# Patient Record
Sex: FEMALE | Race: BLACK OR AFRICAN AMERICAN | NOT HISPANIC OR LATINO | Employment: OTHER | URBAN - METROPOLITAN AREA
[De-identification: names, ages, dates, MRNs, and addresses within clinical notes are randomized per-mention and may not be internally consistent; named-entity substitution may affect disease eponyms.]

---

## 2017-03-27 ENCOUNTER — HISTORICAL (OUTPATIENT)
Dept: FAMILY MEDICINE | Facility: CLINIC | Age: 72
End: 2017-03-27

## 2018-05-02 ENCOUNTER — HISTORICAL (OUTPATIENT)
Dept: RADIOLOGY | Facility: HOSPITAL | Age: 73
End: 2018-05-02

## 2018-06-20 ENCOUNTER — HOSPITAL ENCOUNTER (OUTPATIENT)
Dept: MEDSURG UNIT | Facility: HOSPITAL | Age: 73
End: 2018-06-22
Attending: INTERNAL MEDICINE | Admitting: INTERNAL MEDICINE

## 2018-06-20 LAB
BNP BLD-MCNC: 32 PG/ML (ref 0–150)
TROPONIN I SERPL-MCNC: <0.015 NG/ML (ref 0–0.04)

## 2018-06-22 LAB
ABS NEUT (OLG): 6.77 X10(3)/MCL (ref 2.1–9.2)
BASOPHILS # BLD AUTO: 0 X10(3)/MCL (ref 0–0.2)
BASOPHILS NFR BLD AUTO: 0 %
BUN SERPL-MCNC: 18 MG/DL (ref 7–18)
CALCIUM SERPL-MCNC: 9 MG/DL (ref 8.5–10.1)
CHLORIDE SERPL-SCNC: 97 MMOL/L (ref 98–107)
CO2 SERPL-SCNC: 32 MMOL/L (ref 21–32)
CREAT SERPL-MCNC: 1.16 MG/DL (ref 0.55–1.02)
CREAT/UREA NIT SERPL: 15.5
EOSINOPHIL # BLD AUTO: 0 X10(3)/MCL (ref 0–0.9)
EOSINOPHIL NFR BLD AUTO: 0 %
ERYTHROCYTE [DISTWIDTH] IN BLOOD BY AUTOMATED COUNT: 18.1 % (ref 11.5–17)
GLUCOSE SERPL-MCNC: 94 MG/DL (ref 74–106)
HCT VFR BLD AUTO: 40.5 % (ref 37–47)
HGB BLD-MCNC: 12.3 GM/DL (ref 12–16)
LYMPHOCYTES # BLD AUTO: 1.7 X10(3)/MCL (ref 0.6–4.6)
LYMPHOCYTES NFR BLD AUTO: 18 %
MAGNESIUM SERPL-MCNC: 2.1 MG/DL (ref 1.8–2.4)
MCH RBC QN AUTO: 24.9 PG (ref 27–31)
MCHC RBC AUTO-ENTMCNC: 30.4 GM/DL (ref 33–36)
MCV RBC AUTO: 82.2 FL (ref 80–94)
MONOCYTES # BLD AUTO: 1 X10(3)/MCL (ref 0.1–1.3)
MONOCYTES NFR BLD AUTO: 10 %
NEUTROPHILS # BLD AUTO: 6.77 X10(3)/MCL (ref 1.4–7.9)
NEUTROPHILS NFR BLD AUTO: 71 %
PLATELET # BLD AUTO: 209 X10(3)/MCL (ref 130–400)
PMV BLD AUTO: 10.2 FL (ref 9.4–12.4)
POTASSIUM SERPL-SCNC: 4.1 MMOL/L (ref 3.5–5.1)
RBC # BLD AUTO: 4.93 X10(6)/MCL (ref 4.2–5.4)
SODIUM SERPL-SCNC: 137 MMOL/L (ref 136–145)
WBC # SPEC AUTO: 9.6 X10(3)/MCL (ref 4.5–11.5)

## 2018-08-27 ENCOUNTER — HISTORICAL (OUTPATIENT)
Dept: FAMILY MEDICINE | Facility: CLINIC | Age: 73
End: 2018-08-27

## 2018-10-19 ENCOUNTER — HISTORICAL (OUTPATIENT)
Dept: ADMINISTRATIVE | Facility: HOSPITAL | Age: 73
End: 2018-10-19

## 2018-10-19 LAB
CHOLEST SERPL-MCNC: 180 MG/DL
CHOLEST/HDLC SERPL: 3.8 {RATIO} (ref 0–4.4)
DEPRECATED CALCIDIOL+CALCIFEROL SERPL-MC: 11.71 NG/ML (ref 30–80)
EST. AVERAGE GLUCOSE BLD GHB EST-MCNC: 137 MG/DL
HAV IGM SERPL QL IA: NONREACTIVE
HBA1C MFR BLD: 6.4 % (ref 4.2–6.3)
HBV CORE IGM SERPL QL IA: NONREACTIVE
HBV SURFACE AG SERPL QL IA: NEGATIVE
HCV AB SERPL QL IA: NONREACTIVE
HDLC SERPL-MCNC: 48 MG/DL
HIV 1+2 AB+HIV1 P24 AG SERPL QL IA: NONREACTIVE
LDLC SERPL CALC-MCNC: 105 MG/DL (ref 0–130)
T PALLIDUM AB SER QL: NONREACTIVE
TRIGL SERPL-MCNC: 137 MG/DL
TSH SERPL-ACNC: 3.4 MIU/L (ref 0.36–3.74)
VLDLC SERPL CALC-MCNC: 27 MG/DL

## 2019-08-02 ENCOUNTER — HISTORICAL (OUTPATIENT)
Dept: ADMINISTRATIVE | Facility: HOSPITAL | Age: 74
End: 2019-08-02

## 2019-08-02 LAB
BUN SERPL-MCNC: 14 MG/DL (ref 7–18)
CALCIUM SERPL-MCNC: 8.8 MG/DL (ref 8.5–10.1)
CHLORIDE SERPL-SCNC: 107 MMOL/L (ref 98–107)
CO2 SERPL-SCNC: 33 MMOL/L (ref 21–32)
CREAT SERPL-MCNC: 1.1 MG/DL (ref 0.6–1.3)
CREAT/UREA NIT SERPL: 13
DEPRECATED CALCIDIOL+CALCIFEROL SERPL-MC: 11.96 NG/ML (ref 30–80)
EST. AVERAGE GLUCOSE BLD GHB EST-MCNC: 126 MG/DL
GLUCOSE SERPL-MCNC: 85 MG/DL (ref 74–106)
HBA1C MFR BLD: 6 % (ref 4.2–6.3)
POTASSIUM SERPL-SCNC: 4.9 MMOL/L (ref 3.5–5.1)
SODIUM SERPL-SCNC: 142 MMOL/L (ref 136–145)

## 2019-11-21 ENCOUNTER — HISTORICAL (OUTPATIENT)
Dept: ADMINISTRATIVE | Facility: HOSPITAL | Age: 74
End: 2019-11-21

## 2019-11-21 LAB — DEPRECATED CALCIDIOL+CALCIFEROL SERPL-MC: 10.71 NG/ML (ref 30–80)

## 2020-09-11 ENCOUNTER — HISTORICAL (OUTPATIENT)
Dept: ADMINISTRATIVE | Facility: HOSPITAL | Age: 75
End: 2020-09-11

## 2020-09-11 LAB
ALBUMIN SERPL-MCNC: 3.7 GM/DL (ref 3.4–5)
ALBUMIN/GLOB SERPL: 0.9 RATIO (ref 1.1–2)
ALP SERPL-CCNC: 104 UNIT/L (ref 45–117)
ALT SERPL-CCNC: 13 UNIT/L (ref 12–78)
AMPHET UR QL SCN: NEGATIVE
APPEARANCE, UA: CLEAR
AST SERPL-CCNC: 11 UNIT/L (ref 15–37)
BACTERIA #/AREA URNS AUTO: ABNORMAL /HPF
BARBITURATE SCN PRESENT UR: NEGATIVE
BENZODIAZ UR QL SCN: NEGATIVE
BILIRUB SERPL-MCNC: 0.1 MG/DL (ref 0.2–1)
BILIRUB UR QL STRIP: NEGATIVE
BILIRUBIN DIRECT+TOT PNL SERPL-MCNC: <0.1 MG/DL (ref 0–0.2)
BILIRUBIN DIRECT+TOT PNL SERPL-MCNC: ABNORMAL MG/DL
BUN SERPL-MCNC: 11 MG/DL (ref 7–18)
CALCIUM SERPL-MCNC: 8.8 MG/DL (ref 8.5–10.1)
CANNABINOIDS UR QL SCN: NEGATIVE
CHLORIDE SERPL-SCNC: 106 MMOL/L (ref 98–107)
CO2 SERPL-SCNC: 29 MMOL/L (ref 21–32)
COCAINE UR QL SCN: NEGATIVE
COLOR UR: YELLOW
CREAT SERPL-MCNC: 0.8 MG/DL (ref 0.6–1.3)
DEPRECATED CALCIDIOL+CALCIFEROL SERPL-MC: 7.1 NG/ML (ref 30–80)
EST. AVERAGE GLUCOSE BLD GHB EST-MCNC: 137 MG/DL
GLOBULIN SER-MCNC: 4 GM/ML (ref 2.3–3.5)
GLUCOSE (UA): NEGATIVE
GLUCOSE SERPL-MCNC: 95 MG/DL (ref 74–106)
HBA1C MFR BLD: 6.4 % (ref 4.2–6.3)
HGB UR QL STRIP: NEGATIVE
HYALINE CASTS #/AREA URNS LPF: ABNORMAL /LPF
KETONES UR QL STRIP: NEGATIVE
LEUKOCYTE ESTERASE UR QL STRIP: 500 LEU/UL
NITRITE UR QL STRIP: NEGATIVE
OPIATES UR QL SCN: NEGATIVE
PCP UR QL: NEGATIVE
PH UR STRIP.AUTO: 6 [PH] (ref 5–8)
PH UR STRIP: 6 [PH] (ref 4.5–8)
POTASSIUM SERPL-SCNC: 3.8 MMOL/L (ref 3.5–5.1)
PROT SERPL-MCNC: 7.7 GM/DL (ref 6.4–8.2)
PROT UR QL STRIP: 10 MG/DL
RBC #/AREA URNS AUTO: ABNORMAL /HPF
SODIUM SERPL-SCNC: 142 MMOL/L (ref 136–145)
SP GR UR STRIP: 1.02 (ref 1–1.03)
SQUAMOUS #/AREA URNS LPF: ABNORMAL /LPF
TEMPERATURE, URINE (OHS): 20.2 DEGC (ref 20–25)
UROBILINOGEN UR STRIP-ACNC: NORMAL
WBC #/AREA URNS AUTO: ABNORMAL /HPF

## 2021-02-24 ENCOUNTER — HISTORICAL (OUTPATIENT)
Dept: ADMINISTRATIVE | Facility: HOSPITAL | Age: 76
End: 2021-02-24

## 2021-02-24 LAB
ALBUMIN SERPL-MCNC: 3.5 GM/DL (ref 3.4–4.8)
ALBUMIN/GLOB SERPL: 1 RATIO (ref 1.1–2)
ALP SERPL-CCNC: 96 UNIT/L (ref 40–150)
ALT SERPL-CCNC: 8 UNIT/L (ref 0–55)
AST SERPL-CCNC: 10 UNIT/L (ref 5–34)
BILIRUB SERPL-MCNC: 0.1 MG/DL
BILIRUBIN DIRECT+TOT PNL SERPL-MCNC: 0 MG/DL (ref 0–0.8)
BILIRUBIN DIRECT+TOT PNL SERPL-MCNC: 0.1 MG/DL (ref 0–0.5)
BUN SERPL-MCNC: 20.2 MG/DL (ref 9.8–20.1)
CALCIUM SERPL-MCNC: 8.9 MG/DL (ref 8.4–10.2)
CHLORIDE SERPL-SCNC: 103 MMOL/L (ref 98–107)
CO2 SERPL-SCNC: 30 MMOL/L (ref 23–31)
CREAT SERPL-MCNC: 0.98 MG/DL (ref 0.55–1.02)
DEPRECATED CALCIDIOL+CALCIFEROL SERPL-MC: 29.4 NG/ML (ref 30–80)
GLOBULIN SER-MCNC: 3.6 GM/DL (ref 2.4–3.5)
GLUCOSE SERPL-MCNC: 92 MG/DL (ref 82–115)
POTASSIUM SERPL-SCNC: 4.3 MMOL/L (ref 3.5–5.1)
PROT SERPL-MCNC: 7.1 GM/DL (ref 5.8–7.6)
SODIUM SERPL-SCNC: 140 MMOL/L (ref 136–145)

## 2021-08-19 ENCOUNTER — HISTORICAL (OUTPATIENT)
Dept: RADIOLOGY | Facility: HOSPITAL | Age: 76
End: 2021-08-19

## 2021-09-04 ENCOUNTER — HOSPITAL ENCOUNTER (OUTPATIENT)
Dept: EMERGENCY MEDICINE | Facility: HOSPITAL | Age: 76
End: 2021-09-05
Attending: INTERNAL MEDICINE | Admitting: INTERNAL MEDICINE

## 2021-09-04 LAB
ABS NEUT (OLG): 8.07 X10(3)/MCL (ref 2.1–9.2)
ALBUMIN SERPL-MCNC: 4 GM/DL (ref 3.4–4.8)
ALBUMIN/GLOB SERPL: 1 RATIO (ref 1.1–2)
ALP SERPL-CCNC: 87 UNIT/L (ref 40–150)
ALT SERPL-CCNC: 11 UNIT/L (ref 0–55)
ANISOCYTOSIS BLD QL SMEAR: 1
APPEARANCE, UA: CLEAR
AST SERPL-CCNC: 12 UNIT/L (ref 5–34)
BACTERIA SPEC CULT: ABNORMAL /HPF
BASOPHILS # BLD AUTO: 0 X10(3)/MCL (ref 0–0.2)
BASOPHILS NFR BLD AUTO: 0 %
BILIRUB SERPL-MCNC: 0.2 MG/DL
BILIRUB UR QL STRIP: NEGATIVE
BILIRUBIN DIRECT+TOT PNL SERPL-MCNC: 0 MG/DL (ref 0–0.8)
BILIRUBIN DIRECT+TOT PNL SERPL-MCNC: 0.2 MG/DL (ref 0–0.5)
BUN SERPL-MCNC: 13 MG/DL (ref 9.8–20.1)
CALCIUM SERPL-MCNC: 9.7 MG/DL (ref 8.4–10.2)
CHLORIDE SERPL-SCNC: 98 MMOL/L (ref 98–107)
CO2 SERPL-SCNC: 28 MMOL/L (ref 23–31)
COLOR UR: YELLOW
CREAT SERPL-MCNC: 0.84 MG/DL (ref 0.55–1.02)
EOSINOPHIL # BLD AUTO: 0 X10(3)/MCL (ref 0–0.9)
EOSINOPHIL NFR BLD AUTO: 0 %
ERYTHROCYTE [DISTWIDTH] IN BLOOD BY AUTOMATED COUNT: 21.2 % (ref 11.5–17)
GLOBULIN SER-MCNC: 3.9 GM/DL (ref 2.4–3.5)
GLUCOSE (UA): NEGATIVE
GLUCOSE SERPL-MCNC: 106 MG/DL (ref 82–115)
HCT VFR BLD AUTO: 40.5 % (ref 37–47)
HGB BLD-MCNC: 12.2 GM/DL (ref 12–16)
HGB UR QL STRIP: NEGATIVE
HYPOCHROMIA BLD QL SMEAR: 1
IMM GRANULOCYTES # BLD AUTO: 0.03 10*3/UL
IMM GRANULOCYTES NFR BLD AUTO: 0 %
KETONES UR QL STRIP: ABNORMAL
LEUKOCYTE ESTERASE UR QL STRIP: NEGATIVE
LIPASE SERPL-CCNC: 6 U/L
LYMPHOCYTES # BLD AUTO: 1.1 X10(3)/MCL (ref 0.6–4.6)
LYMPHOCYTES NFR BLD AUTO: 11 %
MCH RBC QN AUTO: 22.3 PG (ref 27–31)
MCHC RBC AUTO-ENTMCNC: 30.1 GM/DL (ref 33–36)
MCV RBC AUTO: 73.9 FL (ref 80–94)
MONOCYTES # BLD AUTO: 0.5 X10(3)/MCL (ref 0.1–1.3)
MONOCYTES NFR BLD AUTO: 5 %
NEUTROPHILS # BLD AUTO: 8.07 X10(3)/MCL (ref 2.1–9.2)
NEUTROPHILS NFR BLD AUTO: 83 %
NITRITE UR QL STRIP: NEGATIVE
PH UR STRIP: 7 [PH] (ref 5–9)
PLATELET # BLD AUTO: 270 X10(3)/MCL (ref 130–400)
PLATELET # BLD EST: NORMAL 10*3/UL
PMV BLD AUTO: 9.8 FL (ref 7.4–10.4)
POTASSIUM SERPL-SCNC: 3.7 MMOL/L (ref 3.5–5.1)
PROT SERPL-MCNC: 7.9 GM/DL (ref 5.8–7.6)
PROT UR QL STRIP: ABNORMAL
RBC # BLD AUTO: 5.48 X10(6)/MCL (ref 4.2–5.4)
RBC #/AREA URNS HPF: ABNORMAL /HPF
SARS-COV-2 AG RESP QL IA.RAPID: NEGATIVE
SODIUM SERPL-SCNC: 140 MMOL/L (ref 136–145)
SP GR UR STRIP: >=1.04 (ref 1–1.03)
SQUAMOUS EPITHELIAL, UA: 9 /HPF (ref 0–4)
TARGETS BLD QL SMEAR: 1
UROBILINOGEN UR STRIP-ACNC: 1
WBC # SPEC AUTO: 9.7 X10(3)/MCL (ref 4.5–11.5)
WBC #/AREA URNS HPF: 15 /HPF (ref 0–3)

## 2021-09-05 LAB
BUN SERPL-MCNC: 11.8 MG/DL (ref 9.8–20.1)
CALCIUM SERPL-MCNC: 8.6 MG/DL (ref 8.4–10.2)
CHLORIDE SERPL-SCNC: 96 MMOL/L (ref 98–107)
CO2 SERPL-SCNC: 28 MMOL/L (ref 23–31)
CREAT SERPL-MCNC: 1.02 MG/DL (ref 0.55–1.02)
CREAT/UREA NIT SERPL: 12
GLUCOSE SERPL-MCNC: 124 MG/DL (ref 82–115)
MAGNESIUM SERPL-MCNC: 1.9 MG/DL (ref 1.6–2.6)
POTASSIUM SERPL-SCNC: 3.7 MMOL/L (ref 3.5–5.1)
SODIUM SERPL-SCNC: 138 MMOL/L (ref 136–145)

## 2022-04-11 ENCOUNTER — HISTORICAL (OUTPATIENT)
Dept: ADMINISTRATIVE | Facility: HOSPITAL | Age: 77
End: 2022-04-11
Payer: COMMERCIAL

## 2022-04-27 VITALS
OXYGEN SATURATION: 98 % | HEIGHT: 64 IN | BODY MASS INDEX: 38.73 KG/M2 | WEIGHT: 226.88 LBS | SYSTOLIC BLOOD PRESSURE: 147 MMHG | DIASTOLIC BLOOD PRESSURE: 72 MMHG

## 2022-04-30 NOTE — ED PROVIDER NOTES
"   Patient:   Dayami Aleman            MRN: [id number]            FIN: [id number]               Age:   73 years     Sex:  Female     :  1945   Associated Diagnoses:   Uncontrolled hypertension; Dyspnea; Facial twitching   Author:   Jack Moya MD      Basic Information   Time seen: Date & time 2018 17:01:00.   History source: Patient.   Arrival mode: Private vehicle.   History limitation: None.   Additional information: Chief Complaint from Nursing Triage Note : Chief Complaint   2018 16:32 CDT      Chief Complaint           pt to er c/o headache and right side of face "jerking" pt seen earlier for dizziness, headache and weakness.    2018 8:55 CDT       Chief Complaint           pt to er c/o headache, dizziness and weakness onset this morning. states had blood pressure change yesterday.  .      History of Present Illness   The patient presents with headache and high bp.  The onset was 1  hours ago.  The course/duration of symptoms is constant.  Location: generalized. Radiating pain: none. The character of symptoms is dull.  The degree at onset was moderate.  The degree at maximum was moderate.  The degree at present is moderate.  The exacerbating factor is none.  The relieving factor is none.  Risk factors consist of hypertension.  Prior episodes: occasional.  Therapy today: seen here earlier wiyh labs and ct..  Preceding symptoms: none.  Associated symptoms: dizziness.        Review of Systems             Additional review of systems information: All other systems reviewed and otherwise negative.      Health Status   Allergies:    Allergic Reactions (Selected)  Severity Not Documented  Sulfa drugs- No reactions were documented.,    Allergies (1) Active Reaction  sulfa drugs None Documented  .   Medications:  (Selected)   Prescriptions  Prescribed  albuterol CFC free 90 mcg/inh inhalation aerosol with adapter: 2 puff(s), INH, q6hr, PRN PRN as needed for wheezing, # 1 EA, 2 " Refill(s), Pharmacy: [facility]/pharmacy #[id number]  candesartan 16 mg oral tablet: See Instructions, TAKE 1 TABLET BY MOUTH TWICE A DAY, # 180 tab(s), 2 Refill(s), eRx: [facility]/pharmacy #[id number]  spironolactone 25 mg oral tablet: See Instructions, TAKE 1 TABLET BY MOUTH AFTER BREAKFAST, # 90 tab(s), 1 Refill(s), eRx: [facility]/pharmacy #[id number]  traMADol 50 mg oral tablet: 50 mg = 1 tab(s), Oral, Daily, PRN PRN as needed for pain, # 30 tab(s), 0 Refill(s)  verapamil 120 mg oral tablet, extended release: 120 mg = 1 tab(s), Oral, Daily, # 90 tab(s), 2 Refill(s), Pharmacy: [facility]/pharmacy #[id number]  Documented Medications  Documented  Qvar 40 mcg/inh inhalation aerosol with adapter: 1 inh, INH, 2 puff(s), INH, bid, # 1 EA, 4 Refill(s), Type: Maintenance, Pharmacy: [facility]/pharmacy #[id number], 2 puff(s) inh bid  aspirin 325 mg oral tablet: 325 mg = 1 tab(s), Oral, Daily, 0 Refill(s).      Past Medical/ Family/ Social History   Medical history:    No active or resolved past medical history items have been selected or recorded., Reviewed as documented in chart, htn.   Surgical history:    Hernia repair ([id number]).  Gallbladder operation ([id number])., Reviewed as documented in chart.   Family history:    EMPHYSEMA  Brother  Heart disease  Father  Mother  Primary malignant neoplasm of bone  Sister  Stroke  Father  Mother  Esophageal  Father  Hypertension.  Father  Mother  , Reviewed as documented in chart.   Social history:    Social & Psychosocial Habits    Alcohol  07/08/2015 Risk Assessment: Denies Alcohol Use    02/17/2016  Use: Never    Substance Abuse  07/08/2015 Risk Assessment: Denies Substance Abuse    Tobacco  07/08/2015 Risk Assessment: Denies Tobacco Use    02/17/2016  Use: Former smoker    Type: Cigarettes  , Reviewed as documented in chart, Tobacco use: Denies.   Problem list:    Active Problems (4)  Chronic low back pain(  Confirmed  )   HTN (hypertension)(  Confirmed  )   Obesity(  Probable Diagnosis  )   Osteoarthritis(  Confirmed  )   .       Physical Examination               Vital Signs   Vital Signs   6/20/2018 16:53 CDT      Systolic Blood Pressure   198 mmHg  HI                             Diastolic Blood Pressure  81 mmHg    6/20/2018 16:32 CDT      Temperature Oral          37.2 DegC                             Temperature Oral (calculated)             98.96 DegF                             Peripheral Pulse Rate     79 bpm                             Respiratory Rate          20 br/min                             SpO2                      98 %                             Oxygen Therapy            Room air                             Systolic Blood Pressure   216 mmHg  HI                             Diastolic Blood Pressure  94 mmHg  HI    6/20/2018 10:09 CDT      Peripheral Pulse Rate     76 bpm                             SpO2                      99 %                             Systolic Blood Pressure   157 mmHg  HI                             Diastolic Blood Pressure  69 mmHg                             Mean Arterial Pressure, Cuff              98 mmHg    6/20/2018 9:37 CDT       Peripheral Pulse Rate     75 bpm                             Respiratory Rate          20 br/min                             SpO2                      99 %                             Systolic Blood Pressure   167 mmHg  HI                             Diastolic Blood Pressure  67 mmHg                             Mean Arterial Pressure, Cuff              100 mmHg    6/20/2018 8:55 CDT       Temperature Oral          37 DegC                             Temperature Oral (calculated)             98.60 DegF                             Peripheral Pulse Rate     99 bpm                             Respiratory Rate          20 br/min                             SpO2                      99 %                             Oxygen Therapy            Room air                             Systolic Blood Pressure   217 mmHg  HI                             Diastolic Blood Pressure  93  mmHg  HI                             Mean Arterial Pressure, Cuff              134 mmHg  .      Vital Signs (last 24 hrs)_____  Last Charted___________  Temp Oral     37.2 DegC  (JUN 20 16:32)  Heart Rate Peripheral   79 bpm  (JUN 20 16:32)  Resp Rate         20 br/min  (JUN 20 16:32)  SBP      H 198mmHg  (JUN 20 16:53)  DBP      81 mmHg  (JUN 20 16:53)  SpO2      98 %  (JUN 20 16:32)  .   Measurements   6/20/2018 16:32 CDT      Weight Dosing             113.5 kg                             Weight Measured and Calculated in Lbs     250.22 lb                             Weight Estimated          113.5 kg                             Height/Length Dosing      163 cm                             Height/Length Estimated   163 cm                             Body Mass Index Estimated 42.72 kg/m2    6/20/2018 8:55 CDT       Weight Dosing             109 kg                             Weight Measured and Calculated in Lbs     240.30 lb                             Weight Estimated          109 kg                             Height/Length Dosing      162.5 cm                             Height/Length Estimated   162.5 cm                             Body Mass Index Estimated 41.28 kg/m2  .   Basic Oxygen Information   6/20/2018 16:32 CDT      SpO2                      98 %                             Oxygen Therapy            Room air    6/20/2018 10:09 CDT      SpO2                      99 %    6/20/2018 9:37 CDT       SpO2                      99 %    6/20/2018 8:55 CDT       SpO2                      99 %                             Oxygen Therapy            Room air  .   General:  Alert, no acute distress.    Skin:  Warm.   Head:  Normocephalic.   Neck:  Supple.   Eye:  Pupils are equal, round and reactive to light, extraocular movements are intact.    Ears, nose, mouth and throat:  Oral mucosa moist.   Cardiovascular:  Regular rate and rhythm.   Respiratory:  Lungs are clear to auscultation.   Chest wall:  No tenderness.    Back:  Normal range of motion.   Musculoskeletal:  Normal ROM.   Gastrointestinal:  Non distended.   Neurological:  Alert and oriented to person, place, time, and situation, No focal neurological deficit observed, CN II-XII intact, normal sensory observed, normal motor observed, normal speech observed.    Psychiatric:  Cooperative.      Medical Decision Making   Documents reviewed:  Emergency department nurses' notes.   Orders  Launch Order Profile (Selected)   Inpatient Orders  Completed  hydrALAZINE (Apresoline) Inj.: 20 mg, form: Injection, IV Push, Now, first dose 06/20/18 16:53:00 CDT, stop date 06/20/18 16:53:00 CDT, STAT.   Results review:     No qualifying data available.      Reexamination/ Reevaluation   Time: 6/20/2018 17:36:00 .   Vital signs   Basic Oxygen Information   6/20/2018 16:32 CDT      SpO2                      98 %                             Oxygen Therapy            Room air    6/20/2018 10:09 CDT      SpO2                      99 %    6/20/2018 9:37 CDT       SpO2                      99 %    6/20/2018 8:55 CDT       SpO2                      99 %                             Oxygen Therapy            Room air     Course: improving.   Assessment: exam improved.   Notes: pt now c/o worsening dyspnea than normal. .      Impression and Plan   Diagnosis   Uncontrolled hypertension (SUV46-NQ I10)   Dyspnea (CBO52-[place] R06.00)   Facial twitching (JMU92-OB G51.4)      Calls-Consults   -  6/20/2018 18:32:00 , Jodi Rose.    Plan   Condition: Stable.    Disposition: Admit time  6/20/2018 18:32:00, Place in Observation Unit,  Discharged: Time  6/20/2018 17:39:00, to home    Prescriptions:  Launch prescriptions   Pharmacy:  verapamil 120 mg oral tablet, extended release (Prescribe): 120 mg = 1 tab(s), Oral, BID, # 90 tab(s), 2 Refill(s), Pharmacy: [facility]/pharmacy #[id number]  verapamil 120 mg oral tablet, extended release (Discontinue): 6/20/2018 17:39 CDT      Patient was given the following  educational materials:  Hypertension    Follow up with:  Your physician Within 1 to 2 days

## 2022-04-30 NOTE — ED PROVIDER NOTES
"   Patient:   Dayami Aleman            MRN: [id number]            FIN: [id number]               Age:   76 years     Sex:  Female     :  1945   Associated Diagnoses:   Abdominal pain; Hypertension   Author:   Maria De Jesus WASHINGTON MD, Chun MURDOCK      Basic Information   Time seen: Date & time 2021 08:42:00.   History source: Patient.   Arrival mode: Ambulance.   History limitation: None.   Additional information: Chief Complaint from Nursing Triage Note : Chief Complaint   2021 7:59 CDT        Chief Complaint           pt to ER via [facility] for abd pain and n/v.  started yesterday  .      History of Present Illness   The patient presents with 77 y/o female with hx of HTN and ventral abdominal wall hernia s/p multiple repairs with mesh presents to the ED complaining of left sided abdominal pain onset one day ago. Associated symptoms include nausea and vomiting. The pt states she has been vomiting everything including "green stuff" and notes it kept her up all night. She notes she had a normal BM yesterday evening.   .  The onset was 1  days ago.  The course/duration of symptoms is constant.  The character of symptoms is "Pain".  The degree at onset was moderate.  The Location of pain at onset was left and abdominal.  The degree at present is moderate.  The Location of pain at present is left and abdominal.  Radiating pain: none. The exacerbating factor is none.  The relieving factor is none.  Therapy today: none.  Associated symptoms: nausea and vomiting.  Additional history: none.        Review of Systems   Constitutional symptoms:  No fever, no chills, no sweats, no weakness   Skin symptoms:  No rash   Eye symptoms:  No recent vision problems   ENMT symptoms:  No ear pain   Respiratory symptoms:  No shortness of breath, no orthopnea   Cardiovascular symptoms:  No chest pain, no palpitations   Gastrointestinal symptoms:  Abdominal pain, pain, left sided, nausea, vomiting, no diarrhea   Genitourinary symptoms:  " No dysuria, no hematuria.    Musculoskeletal symptoms:  No back pain, no Muscle pain   Psychiatric symptoms:  No anxiety, no depression.    Allergy/immunologic symptoms:  No seasonal allergies, no food allergies             Additional review of systems information: All other systems reviewed and otherwise negative.      Health Status   Allergies:    Allergic Reactions (Selected)  Severity Not Documented  Sulfa drugs- Hives..   Medications:  (Selected)   Inpatient Medications  Ordered  Sodium Chloride 0.9% intravenous solution 1,000 mL: 1,000 mL, 1,000 mL, IV, BOLUS, start date 09/04/21 8:47:00 CDT, 2.11, m2  Zofran 2 mg/mL injectable solution: 4 mg, form: Injection, IV Push, Once PRN for nausea, first dose 09/04/21 8:47:00 CDT, STAT  morphine 4 mg/mL preservative-free intravenous solution: 4 mg, form: Injection, IV Push, Once, first dose 09/04/21 8:45:00 CDT, stop date 09/04/21 8:45:00 CDT, STAT, ( > 7 on pain scale)  Prescriptions  Prescribed  Vitamin D3 5000 intl units (125 mcg) oral tablet: 5,000 IntUnit = 1 tab(s), Oral, Daily, # 100 tab(s), 1 Refill(s), Pharmacy: [facility], 163, cm, Height/Length Dosing, 05/09/21 13:52:00 CDT, 112.1, kg, Weight Dosing, 06/08/21 14:57:00 CDT  albuterol CFC free 90 mcg/inh inhalation aerosol with adapter: 2 puff(s), INH, q6hr, PRN PRN as needed for wheezing, use with spacer chamber, # 1 EA, 11 Refill(s), Pharmacy: [facility]  amLODIPine 10 mg oral tablet: See Instructions, TAKE 1 TABLET DAILY, # 90 tab(s), 3 Refill(s), Pharmacy: [facility], 163, cm, Height/Length Dosing, 05/09/21 13:52:00 CDT, 112.1, kg, Weight Dosing, 06/08/21 14:57:00 CDT  aspirin 81 mg oral tablet: 81 mg = 1 tab(s), Oral, Daily, # 90 tab(s), 3 Refill(s), [facility], 162.58, cm, Height/Length Dosing, 02/14/20 8:40:00 CST, 118.5, kg, Weight Dosing, 02/14/20 8:40:00 CST  candesartan 32 mg oral tablet: 32 mg = 1 tab(s),  Oral, Daily, # 90 tab(s), 3 Refill(s), Pharmacy: [facility], 163, cm, Height/Length Dosing, 05/09/21 13:52:00 CDT, 112.1, kg, Weight Dosing, 06/08/21 14:57:00 CDT  cloniDINE 0.1 mg oral tablet: 0.1 mg = 1 tab(s), Oral, BID, # 180 tab(s), 3 Refill(s), Pharmacy: [facility], 163, cm, Height/Length Dosing, 05/09/21 13:52:00 CDT, 112.1, kg, Weight Dosing, 06/08/21 14:57:00 CDT  docusate sodium 100 mg oral capsule: 100 mg = 1 cap(s), Oral, BID, # 60 cap(s), 3 Refill(s), Pharmacy: [facility], 162.58, cm, Height/Length Dosing, 02/14/20 8:40:00 CST, 118.5, kg, Weight Dosing, 02/14/20 8:40:00 CST  hydrALAZINE 50 mg oral tablet: 50 mg = 1 tab(s), Oral, BID, # 180 tab(s), 3 Refill(s), Pharmacy: [facility], 163, cm, Height/Length Dosing, 05/09/21 13:52:00 CDT, 112.1, kg, Weight Dosing, 06/08/21 14:57:00 CDT  loratadine 10 mg oral tablet: 10 mg = 1 tab(s), Oral, Daily, # 90 tab(s), 3 Refill(s), Pharmacy: [facility], 163, cm, Height/Length Dosing, 05/09/21 13:52:00 CDT, 112.1, kg, Weight Dosing, 06/08/21 14:57:00 CDT  spironolactone 25 mg oral tablet: See Instructions, TAKE 1 TABLET DAILY, # 90 tab(s), 3 Refill(s), Pharmacy: [facility], 163, cm, Height/Length Dosing, 05/09/21 13:52:00 CDT, 112.1, kg, Weight Dosing, 06/08/21 14:57:00 CDT  traMADol 50 mg oral tablet: 50 mg = 1 tab(s), Oral, TID, PRN PRN for pain, Duration/amount >7 days medically necessary.   Not to exceed 400 mg/day, # 90 tab(s), 0 Refill(s), Pharmacy: [facility], 163, cm, Height/Length Dosing, 08/23/21 8:11:00 CDT, 107.4, kg...  traMADol 50 mg oral tablet: 50 mg = 1 tab(s), Oral, TID, PRN PRN pain, Duration/amount >7 days medically necessary.   Not to exceed 400 mg/day, # 90 tab(s), 0 Refill(s), Pharmacy: [facility], 163, cm, Height/Length Dosing, 08/23/21 8:11:00 CDT, 107.4, kg,  We...  traMADol 50 mg oral tablet: 50 mg = 1 tab(s), Oral, TID, PRN PRN pain, Duration/amount >7 days medically necessary.   Not to exceed 400 mg/day, # 90 tab(s), 0 Refill(s), Pharmacy: [facility], 163, cm, Height/Length Dosing, 08/23/21 8:11:00 CDT, 107.4, kg, We...  traZODONE 50 mg oral tablet ( Desyrel ): 50 mg = 1 tab(s), Oral, Once a day (at bedtime), # 30 tab(s), 2 Refill(s), Pharmacy: [facility], 163, cm, Height/Length Dosing, 05/09/21 13:52:00 CDT, 112.1, kg, Weight Dosing, 06/08/21 14:57:00 CDT.   Immunizations: Unknown.      Past Medical/ Family/ Social History   Medical history: Pt states she has a hx of hernias.  Heart murmur   HTN  Lumbar degenerative disc disease   Multilevel degenerative disc disease   MAYANK on CPAP   Osteoarthritis  Vitamin D deficiency   .   Surgical history:    colonoscopy in 2006 at 60 Years.  Hernia repair ([id number]).  Cholecystectomy..   Family history:    Father  Heart disease  Hypertension.  Stroke  Esophageal  Mother  Heart disease  Hypertension.  Stroke  Brother  EMPHYSEMA  Sister  Primary malignant neoplasm of bone  .   Social history: Alcohol use: Denies, Tobacco use: Denies, Drug use: Denies, Family/social situation: .      Physical Examination               Vital Signs   Vital Signs   9/4/2021 8:42 CDT        Peripheral Pulse Rate     73 bpm                             Heart Rate Monitored      71 bpm                             Respiratory Rate          16 br/min                             SpO2                      96 %                             Oxygen Therapy            Room air                             Systolic Blood Pressure   237 mmHg  HI                             Diastolic Blood Pressure  117 mmHg  HI                             Mean Arterial Pressure, Cuff              157 mmHg    9/4/2021 7:59 CDT        Temperature Temporal Artery               35.8 DegC  LOW                             Peripheral Pulse  Rate     76 bpm                             Respiratory Rate          16 br/min                             SpO2                      96 %                             Oxygen Therapy            Room air                             Systolic Blood Pressure   190 mmHg  HI                             Diastolic Blood Pressure  86 mmHg  .   Basic Oxygen Information   9/4/2021 8:42 CDT        SpO2                      96 %                             Oxygen Therapy            Room air    9/4/2021 7:59 CDT        SpO2                      96 %                             Oxygen Therapy            Room air  .   General:  Alert, Pt appears uncomfortable   Neurological:  Alert and oriented to person, place, time, and situation, No focal neurological deficit observed, CN II-XII intact, normal sensory observed, normal motor observed, normal speech observed   Skin:  Warm, pink, intact, moist, no rash, midline abdominal surgical scar, dry, intact   Head:  Normocephalic, atraumatic   , Mouth: Dry mucous membranes. Cardiovascular:  Regular rate and rhythm, No murmur, Normal peripheral perfusion, No edema   Respiratory:  Lungs are clear to auscultation, respirations are non-labored, breath sounds are equal, Symmetrical chest wall expansion.    Musculoskeletal:  Normal ROM, normal strength   Gastrointestinal:  Non distended, Normal bowel sounds, 8x10 cm palpable firm mass over midline, tenderness to palpation, moderate   Lymphatics:  No lymphadenopathy.   Psychiatric:  Cooperative, appropriate mood & affect, normal judgment      Medical Decision Making   Differential Diagnosis:  Abdominal pain, Appendicitis, bowel obstruction, bowel perforation, renal stone, cholecystitis, hepatitis, pancreatitis, pyelonephritis, gastroenteritis, incarcerated hernia.    Rationale:  75 yo with pMHx abd wall hernia s/p mesh repair presenting for pain over hernia repair site, nausea and vomiting. Large mass noted to L abd wall, tender to palpation. Unable  to keep down HTN med due to n/v, hypertensive >200s systolic. Per chart review - patient has severe HTN and was similarly in 200s/100s on last ED presntation. 20 mg IV labetalol given, will reassess - suspect acute pain is a contributing factor. Will treat pain/nausea, obtain CT abd/pelvis and surgical consult PRN. .   Documents reviewed:  Emergency department nurses' notes.   Orders  Launch Order Profile (Selected)   Inpatient Orders  Ordered  Sodium Chloride 0.9% intravenous solution 1,000 mL: 1,000 mL, 1,000 mL, IV, BOLUS, start date 21 8:47:00 CDT, 2.11, m2  Ordered (Dispatched)  Urinalysis with Reflex: Stat collect, Urine, 21 8:08:00 CDT, Stop date 21 8:08:00 CDT, Nurse collect, Print Label By Order Location  Ordered (Exam Ordered)  CT Abdomen and Pelvis W Contrast: Stat, 21 8:42:00 CDT, Abdominal Pain, None, Stretcher, Patient Has IV?, Creatinine if needed per protocol, Rad Type, 21 8:42:00 CDT  Ordered (In-Lab)  Blood Smear Microscopic Exam: NOW collect, 21 8:23:00 CDT, Blood, Collected, Stop date 21 8:23:00 CDT, Lab Collect, Print Label By Order Location, 21 8:08:00 CDT  CMP: STAT collect, 21 8:23:06 CDT, BLOOD, Collected, Stop date 21 8:23:00 CDT, Lab Collect  Lipase Level: STAT collect, 21 8:23:06 CDT, BLOOD, Collected, Stop date 21 8:08:00 CDT, Lab Collect  Completed  Automated Diff: NOW collect, 21 8:23:00 CDT, Blood, Collected, Stop date 21 8:23:00 CDT, Lab Collect, Print Label By Order Location, 21 8:08:00 CDT  CBC w/ Auto Diff: NOW collect, 21 8:23:06 CDT, BLOOD, Collected, Stop date 21 8:23:00 CDT, Lab Collect  EK21 8:14:00 CDT, 21 8:14:00 CDT, Darrin, Patient Has IV, Standard Precautions, NOW, -1, -1, 21 8:14:00 CDT  Zofran 2 mg/mL injectable solution: 4 mg, form: Injection, IV Push, Once PRN for nausea, first dose 21 8:47:00 CDT, STAT  morphine 4 mg/mL  preservative-free intravenous solution: 4 mg, form: Injection, IV Push, Once, first dose 09/04/21 8:45:00 CDT, stop date 09/04/21 8:45:00 CDT, STAT, ( > 7 on pain scale).   Cardiac monitor:  Normal sinus rhythm.   Electrocardiogram:  Time 9/4/2021 08:01:00, rate 67, normal sinus rhythm, No ST-T changes, normal AR & QRS intervals, EP Interp.    Results review:  Lab results : Lab View   9/4/2021 8:23 CDT        WBC                       9.7 x10(3)/mcL                             RBC                       5.48 x10(6)/mcL  HI                             Hgb                       12.2 gm/dL                             Hct                       40.5 %                             Platelet                  270 x10(3)/mcL                             MCV                       73.9 fL  LOW                             MCH                       22.3 pg  LOW                             MCHC                      30.1 gm/dL  LOW                             RDW                       21.2 %  HI                             MPV                       9.8 fL                             Abs Neut                  8.07 x10(3)/mcL                             Neutro Auto               83 %  NA                             Lymph Auto                11 %  NA                             Mono Auto                 5 %  NA                             Eos Auto                  0 %  NA                             Abs Eos                   0.0 x10(3)/mcL                             Basophil Auto             0 %  NA                             Abs Neutro                8.07 x10(3)/mcL                             Abs Lymph                 1.1 x10(3)/mcL                             Abs Mono                  0.5 x10(3)/mcL                             Abs Baso                  0.0 x10(3)/mcL                             IG%                       0  NA                             IG#                       0.03  NA  .      Reexamination/ Reevaluation   Time:  9/4/2021 10:55:00 .   Notes: Patient with persistent pain/n/v after morphine and zofran. Will consult surgery for further evaluation given history of abdominal hernia and mesh procedures  .   Time: 9/4/2021 10:55:00 .   Notes: Signed out to oncoming provider pending surgery consultation .   Time: 9/5/2021 13:44:00 .   Notes: Surgery with no intervention - admitted to hospitalist Hardik Blount .      Impression and Plan   Diagnosis   Abdominal pain (PNED [id number])   Hypertension ([id number])      Calls-Consults   -  9/5/2021 10:55:00 , General surgery, consult.    -  9/5/2021 13:44:00 , Internal medicine, consult, recommends admisison to Dr. Hardik Blount.    Plan   Disposition: Admit time  9/5/2021 13:44:00, Place in Observation Unit.    Counseled: Patient, Family, Regarding diagnosis, Regarding diagnostic results, Regarding treatment plan, Patient indicated understanding of instructions, Family verbalizes understanding .    Notes: IClaudia, acted solely as a scribe for and in the presence of Dr. Pretty who performed this service., I, Chun Pretty MD, performed the the history, physical exam, MDM and procedures as above and agree with the scribes documentation..

## 2022-04-30 NOTE — H&P
Patient:   Dayami Aleman            MRN: [id number]            FIN: [id number]               Age:   73 years     Sex:  Female     :  1945   Associated Diagnoses:   None   Author:   Enrique Davenport MD      Basic Information   Time Seen:  Date & Time 2018 21:57:00.    Source of history:  Self.    Referral source:  Transfer from [facility] ED.    Advance directive:  Full code.       Chief Complaint   headache      History of Present Illness   Mrs. Aleman is a 72 yo AAF with a pmhx of HTN who presents to the ED at [facility] for the 2nd time today with complaints of a headache, dizziness and weakness.  She initially presented to the emergency department this morning with complaints of a headache dizziness and weakness stating that her PCP added a new medication (verapimil) and after she took it this morning she felt weak, dizzy and headache so she presented to the ED for further evaluation. She also reports that she ran out of her Procardia yesterday and is having problems with her insurance company with refilling it. During her stay in the ED this morning, she was given antihypertensives with improvement in her blood pressure. CT of the head done earlier today revealed no acute intracranial abnormalities and discharged home and told not to take any of her BP meds for the rest of the day.  She reports her headache was resolved prior to her discharge but shortly after she got home her headache and weakness came back.  She denies any unilateral weakness, vision changes, nausea, vomiting, chest pain, shortness of breath, abdominal pain.  Blood pressure this evening upon arrival into the emergency department was 217/93.  She was given 20 mg of hydralazine with some improvement in her blood pressure to 167/67.  .  Patient was still noted to have elevated blood pressures, despite being given hydralazine and therefore she will be admitted to the hospitalist service for management her of her  uncontrolled hypertension.      Review of Systems   Except as documented, all other systems reviewed and negative      Health Status   Allergies:    Allergic Reactions (Selected)  Severity Not Documented  Sulfa drugs- No reactions were documented.,    Allergies (1) Active Reaction  sulfa drugs None Documented     Current medications:  (Selected)   Prescriptions  Prescribed  albuterol CFC free 90 mcg/inh inhalation aerosol with adapter: 2 puff(s), INH, q6hr, PRN PRN as needed for wheezing, # 1 EA, 2 Refill(s), Pharmacy: [facility]/pharmacy #[id number]  candesartan 16 mg oral tablet: See Instructions, TAKE 1 TABLET BY MOUTH TWICE A DAY, # 180 tab(s), 2 Refill(s), eRx: [facility]/pharmacy #[id number]  spironolactone 25 mg oral tablet: See Instructions, TAKE 1 TABLET BY MOUTH AFTER BREAKFAST, # 90 tab(s), 1 Refill(s), eRx: [facility]/pharmacy #[id number]  traMADol 50 mg oral tablet: 50 mg = 1 tab(s), Oral, Daily, PRN PRN as needed for pain, # 30 tab(s), 0 Refill(s)  verapamil 120 mg oral tablet, extended release: 120 mg = 1 tab(s), Oral, BID, # 90 tab(s), 2 Refill(s), Pharmacy: [facility]/pharmacy #[id number]  Documented Medications  Documented  Qvar 40 mcg/inh inhalation aerosol with adapter: 1 inh, INH, 2 puff(s), INH, bid, # 1 EA, 4 Refill(s), Type: Maintenance, Pharmacy: [facility]/pharmacy #[id number], 2 puff(s) inh bid  aspirin 325 mg oral tablet: 325 mg = 1 tab(s), Oral, Daily, 0 Refill(s)   Problem list:    All Problems  Chronic low back pain(  Confirmed  ) / SNOMED CT 304178165 / Confirmed  HTN (hypertension)(  Confirmed  ) / SNOMED CT 3690707394 / Confirmed  Obesity(  Probable Diagnosis  ) / SNOMED CT 9074610453 / Confirmed  Osteoarthritis(  Confirmed  ) / SNOMED CT 9506971259 / Confirmed      Histories     Past Medical History: Hypertension, morbid obesity, chronic lower back pain, osteoarthritis.  Past Surgical History: Cholecystectomy  Family History: Mother and father both with heart disease and a stroke.  Sister with bone cancer  Social History:  No alcohol,  tobacco or illicit drug use.  Former tobacco use.      Physical Examination      Vital Signs (last 24 hrs)_____  Last Charted___________  Temp Oral     37.2 DegC  (JUN 20 16:32)  Heart Rate Peripheral   72 bpm  (JUN 20 19:29)  Resp Rate         20 br/min  (JUN 20 19:29)  SBP      H 183mmHg  (JUN 20 19:29)  DBP      74 mmHg  (JUN 20 19:29)  SpO2      98 %  (JUN 20 19:29)     General:  Alert and oriented, No acute distress.    Cognition and Speech:  Oriented, Speech clear and coherent.    HENT:  Normocephalic, Normal hearing, Oral mucosa is moist.    Eye:  Pupils are equal, round and reactive to light, Normal conjunctiva.    Neck:  Supple, No carotid bruit, No jugular venous distention.    Respiratory:  Lungs are clear to auscultation, Respirations are non-labored, Breath sounds are equal.    Cardiovascular:  Normal rate, Regular rhythm, No murmur, No edema.    Gastrointestinal:  Soft, Non-tender, Non-distended, Normal bowel sounds.    Integumentary:  Warm, Dry, Intact.    Musculoskeletal:  Normal strength, No tenderness, No swelling.    Neurologic:  Alert, Oriented, Normal sensory, No focal deficits.    Psychiatric:  Cooperative, Appropriate mood & affect, Normal judgment.       Review / Management   Laboratory Results   Today's Lab Results : PowerNote Discrete Results   6/20/2018 18:17 CDT      BNP                       32 pg/mL                             Troponin-I                <0.015 ng/mL    6/20/2018 9:05 CDT       WBC                       9.4 x10(3)/mcL                             RBC                       4.89 x10(6)/mcL                             Hgb                       12.3 gm/dL                             Hct                       39.5 %                             Platelet                  221 x10(3)/mcL                             MCV                       80.8 fL                             MCH                       25.2 pg  LOW                             MCHC                      31.1 gm/dL   LOW                             RDW                       18.0 %  HI                             MPV                       10.6 fL  HI                             Abs Neut                  4.82 x10(3)/mcL                             Neutro Auto               51.2 %                             Lymph Auto                37.7 %                             Mono Auto                 8.3 %                             Eos Auto                  2.3 %                             Abs Eos                   0.22 x10(3)/mcL                             Basophil Auto             0.4 %                             Abs Neutro                4.82 x10(3)/mcL                             Abs Lymph                 3.55 x10(3)/mcL                             Abs Mono                  0.78 x10(3)/mcL                             Abs Baso                  0.04 x10(3)/mcL                             NRBC%                     0.0 %                             IG%                       0.100 %                             IG#                       0.0100                             Sodium Lvl                138 mmol/L                             Potassium Lvl             3.5 mmol/L                             Chloride                  102 mmol/L                             CO2                       31.0 mmol/L                             Calcium Lvl               8.8 mg/dL                             Glucose Lvl               122 mg/dL  HI                             BUN                       9.0 mg/dL                             Creatinine                0.77 mg/dL                             eGFR-AA                   >60 mL/min/1.73 m2  NA                             eGFR-HARPREET                  >60 mL/min/1.73 m2  NA                             Bili Total                0.3 mg/dL                             Bili Direct               <0.10 mg/dL                             Bili Indirect             >0.20 mg/dL                             AST                        12 unit/L  LOW                             ALT                       22 unit/L                             Alk Phos                  123 unit/L  HI                             Total Protein             8.1 gm/dL                             Albumin Lvl               3.5 gm/dL                             Globulin                  5 gm/dL  HI                             A/G Ratio                 1  NA        Radiology results   Rad Results (ST)   Accession: [id number]  Order: XR Chest 1 View  Report Dt/Tm: 06/20/2018 18:24  Report:      CLINICAL: Dyspnea.     COMPARISON: None available.                       FINDINGS: Cardiopericardial silhouette is within normal limits.  Lungs  are without dense focal or segmental consolidation, congestive  process, pleural effusions or pneumothorax.       IMPRESSION:     NO ACUTE CARDIOPULMONARY PROCESS IDENTIFIED.             Impression and Plan   Hypertensive Urgency  - routine VS and PRN  - resume home Procardia, candesartan and spironolactone. HOLD home verapamil  - PRN antihypertensives    Headache, likely secondary to above  - CT Head yesterday negative for acute intracranial process  - PRN tylenol      I, Christa Roman, KAMILLEP-C have reviewed and disussed this case with Dr. Nunez    Code status: Full  DVT prophylaxis: [facility]  Admission time 74 minutes.       I, Enrique Wylie, assumed care of this patient today at     .    I had face to face time with this patient and I reviewed the NP/PA documentation, treatment plan and medical decision making.    A: History: htn headache    B: Physical Exam:obesity    C. Medical decision making: htn urgency

## 2022-04-30 NOTE — CONSULTS
Patient:   Dayami Aleman            MRN: [id number]            FIN: [id number]               Age:   76 years     Sex:  Female     :  1945   Associated Diagnoses:   None   Author:   Ignacio Gustafson MD A      Chief Complaint   Abnormal CT imaging of abdomen      History of Present Illness   76-year-old female presents to the emergency room with chronic intermittent epigastric pain and significant weight loss.  Upon arrival she was noted to be in hypertensive urgency with significant nausea vomiting.  CT of the abdomen reveals thickening of the gastric wall.  She has a family history of esophageal cancer and in light of the CT findings, GI is called for further evaluation.    At bedside, patient is stable.  Still with some abdominal pain but no nausea vomiting at this time.  Interview with the family members it appears that the symptoms are chronic intermittent over the past year.  She describes a good appetite tolerating her diet without dysphagia.  She does have occasional reflux and uses acid blockade as needed.  Her bowels move regularly.\    She does have a history of laparoscopic cholecystectomy with subsequent midline ventral hernia requiring repair x2.      Health Status   Allergies:    Allergies (1) Active Reaction  sulfa drugs Hives     Current medications:  (Selected)   Inpatient Medications  Ordered  Carafate 1 g oral tablet: 1 gm, form: Tab, Oral, QIDACHS, first dose 21 16:30:00 CDT, Routine  Lovenox: 40 mg, form: Injection, Subcutaneous, Daily, first dose 21 14:22:00 CDT, STAT  Phenergan: 12.5 mg, form: Injection, IM, q4hr PRN for nausea/vomiting, first dose 21 14:22:00 CDT, STAT, choose only if unrelieved by Zofran or if ordered alone  Protonix 40 mg Vial (IV Push): 40 mg, form: Injection, IV Slow, q12hr, Infuse over: 2 minute(s), first dose 21 14:22:00 CDT, STAT  Zofran: 4 mg, form: Injection, IV Push, q4hr PRN for nausea, first dose 21 14:22:00 CDT,  STAT, choose first if ordered with other treatments for nausea  Zofran: 8 mg, form: Injection, IV Piggyback, q4hr PRN for nausea, first dose 09/04/21 14:22:00 CDT, STAT, choose only if unrelieved by Zofran 4 mg or if ordered alone  acetaminophen: 650 mg, form: Liquid, Oral, q6hr PRN for fever, first dose 09/04/21 14:22:00 CDT, STAT, > 38.1 degrees Celsius  amlodipine 5 mg oral tablet: 10 mg, form: Tab, Oral, Daily, first dose 09/04/21 13:55:00 CDT, STAT  cloniDINE 0.1 mg oral tablet: 0.1 mg, form: Tab, Oral, BID, first dose 09/04/21 13:55:00 CDT, STAT  hydrALAZINE (Apresoline) Inj.: 10 mg, form: Injection, IV Push, q2hr PRN for hypertension, first dose 09/04/21 14:22:00 CDT, STAT, for systolic >180  hydrALAZINE 50 mg oral tablet: 50 mg, form: Tab, Oral, BID, first dose 09/04/21 13:55:00 CDT, STAT  labetalol: 10 mg, form: Soln, IV Push, q2hr PRN for hypertension, first dose 09/04/21 14:22:00 CDT, Routine, SBP > 180_ and/or DBP > 100 not to exceed 300mg/24hrs  spironolactone: 25 mg, form: Tab, Oral, Daily, first dose 09/04/21 13:55:00 CDT, STAT  traMADol: 50 mg, form: Tab, Oral, TID PRN for pain, mild, first dose 09/04/21 13:56:00 CDT, STAT  traZODONE 50 mg oral tablet ( Desyrel ): 50 mg, form: Tab, Oral, Once a day (at bedtime), first dose 09/04/21 13:56:00 CDT, STAT  valsartan: 320 mg, form: Tab, Oral, Daily, first dose 09/04/21 14:05:00 CDT  Prescriptions  Prescribed  Vitamin D3 5000 intl units (125 mcg) oral tablet: 5,000 IntUnit = 1 tab(s), Oral, Daily, # 100 tab(s), 1 Refill(s), Pharmacy: [facility], 163, cm, Height/Length Dosing, 05/09/21 13:52:00 CDT, 112.1, kg, Weight Dosing, 06/08/21 14:57:00 CDT  albuterol CFC free 90 mcg/inh inhalation aerosol with adapter: 2 puff(s), INH, q6hr, PRN PRN as needed for wheezing, use with spacer chamber, # 1 EA, 11 Refill(s), Pharmacy: [facility]  amLODIPine 10 mg oral tablet: See Instructions, TAKE 1 TABLET DAILY, # 90 tab(s), 3 Refill(s),  Pharmacy: [facility], 163, cm, Height/Length Dosing, 05/09/21 13:52:00 CDT, 112.1, kg, Weight Dosing, 06/08/21 14:57:00 CDT  aspirin 81 mg oral tablet: 81 mg = 1 tab(s), Oral, Daily, # 90 tab(s), 3 Refill(s), Pharmacy: [facility], 162.58, cm, Height/Length Dosing, 02/14/20 8:40:00 CST, 118.5, kg, Weight Dosing, 02/14/20 8:40:00 CST  candesartan 32 mg oral tablet: 32 mg = 1 tab(s), Oral, Daily, # 90 tab(s), 3 Refill(s), Pharmacy: [facility], 163, cm, Height/Length Dosing, 05/09/21 13:52:00 CDT, 112.1, kg, Weight Dosing, 06/08/21 14:57:00 CDT  cloniDINE 0.1 mg oral tablet: 0.1 mg = 1 tab(s), Oral, BID, # 180 tab(s), 3 Refill(s), Pharmacy: [facility], 163, cm, Height/Length Dosing, 05/09/21 13:52:00 CDT, 112.1, kg, Weight Dosing, 06/08/21 14:57:00 CDT  docusate sodium 100 mg oral capsule: 100 mg = 1 cap(s), Oral, BID, # 60 cap(s), 3 Refill(s), Pharmacy: [facility], 162.58, cm, Height/Length Dosing, 02/14/20 8:40:00 CST, 118.5, kg, Weight Dosing, 02/14/20 8:40:00 CST  hydrALAZINE 50 mg oral tablet: 50 mg = 1 tab(s), Oral, BID, # 180 tab(s), 3 Refill(s), Pharmacy: [facility], 163, cm, Height/Length Dosing, 05/09/21 13:52:00 CDT, 112.1, kg, Weight Dosing, 06/08/21 14:57:00 CDT  loratadine 10 mg oral tablet: 10 mg = 1 tab(s), Oral, Daily, # 90 tab(s), 3 Refill(s), Pharmacy: [facility], 163, cm, Height/Length Dosing, 05/09/21 13:52:00 CDT, 112.1, kg, Weight Dosing, 06/08/21 14:57:00 CDT  spironolactone 25 mg oral tablet: See Instructions, TAKE 1 TABLET DAILY, # 90 tab(s), 3 Refill(s), Pharmacy: [facility], 163, cm, Height/Length Dosing, 05/09/21 13:52:00 CDT, 112.1, kg, Weight Dosing, 06/08/21 14:57:00 CDT  traMADol 50 mg oral tablet: 50 mg = 1 tab(s), Oral, TID, PRN PRN for pain, Duration/amount >7 days medically necessary.   Not to exceed  400 mg/day, # 90 tab(s), 0 Refill(s), Pharmacy: [facility], 163, cm, Height/Length Dosing, 08/23/21 8:11:00 CDT, 107.4, kg...  traMADol 50 mg oral tablet: 50 mg = 1 tab(s), Oral, TID, PRN PRN pain, Duration/amount >7 days medically necessary.   Not to exceed 400 mg/day, # 90 tab(s), 0 Refill(s), Pharmacy: [facility], 163, cm, Height/Length Dosing, 08/23/21 8:11:00 CDT, 107.4, kg, We...  traMADol 50 mg oral tablet: 50 mg = 1 tab(s), Oral, TID, PRN PRN pain, Duration/amount >7 days medically necessary.   Not to exceed 400 mg/day, # 90 tab(s), 0 Refill(s), Pharmacy: [facility], 163, cm, Height/Length Dosing, 08/23/21 8:11:00 CDT, 107.4, kg, We...  traZODONE 50 mg oral tablet ( Desyrel ): 50 mg = 1 tab(s), Oral, Once a day (at bedtime), # 30 tab(s), 2 Refill(s), Pharmacy: [facility], 163, cm, Height/Length Dosing, 05/09/21 13:52:00 CDT, 112.1, kg, Weight Dosing, 06/08/21 14:57:00 CDT  Documented Medications  Documented  acetaminophen-hydrocodone 325 mg-7.5 mg oral tablet: 1 tab(s), Oral, q6hr  amoxicillin-clavulanate 875 mg-125 mg oral tablet: 875 mg, Oral, q12hr      Histories   Past Medical History:    No active or resolved past medical history items have been selected or recorded.   Family History:    EMPHYSEMA  Brother  Heart disease  Father  Mother  Primary malignant neoplasm of bone  Sister  Stroke  Father  Mother  Esophageal  Father  Hypertension.  Father  Mother     Procedure history:    colonoscopy in 2006 at 60 Years.  Hernia repair ([id number]).  Cholecystectomy.   Social History        Social & Psychosocial Habits    Alcohol  07/08/2015 Risk Assessment: Denies Alcohol Use    04/03/2021  Use: Never    Employment/School  07/09/2018  Status: Retired    Exercise  07/09/2018  Times per week: 1-2 times/week    Exercise type: Walking    Home/Environment  07/09/2018  Lives with: Children    Living situation: Home/Independent     Alcohol abuse in household: No    Substance abuse in household: No    Smoker in household: No    Injuries/Abuse/Neglect in household: No    Nutrition/Health  07/09/2018  Type of diet: LOW SODIUM, Regular    Sexual  07/09/2018  Sexually active: No    Substance Use  07/08/2015 Risk Assessment: Denies Substance Abuse    04/03/2021  Use: Never    Tobacco  07/08/2015 Risk Assessment: Denies Tobacco Use    07/19/2021  Use: Never (less than 100 in l    Patient Wants Consult For Cessation Counseling N/A    08/23/2021  Use: Never (less than 100 in l    Patient Wants Consult For Cessation Counseling N/A    Concerns about tobacco use in household: No    Smokeless tobacco use: Never    Abuse/Neglect  07/19/2021  SHX Any signs of abuse or neglect No    Feels unsafe at home: No    Safe place to go: Yes    08/23/2021  SHX Any signs of abuse or neglect No    Feels unsafe at home: No    Safe place to go: Yes  .              Review of Systems   Constitutional:  No fever, No chills, No weakness, No fatigue.    Respiratory:  No shortness of breath, No cough, No wheezing.    Cardiovascular:  No chest pain, No palpitations, No peripheral edema.    Gastrointestinal:  negative except for that mentioned above in hpi.    Genitourinary:  No dysuria, No hematuria.    Hematology/Lymphatics:  Negative.    Endocrine:  Negative.    Musculoskeletal:  No joint pain, No muscle pain, No claudication.    Integumentary:  No rash, No pruritus, No breakdown.    Neurologic:  Alert and oriented X4, No confusion, No headache.       Physical Examination      Vital Signs (last 24 hrs)_____  Last Charted___________  Heart Rate Peripheral   63 bpm  (SEP 05 12:33)  Resp Rate         16 br/min  (SEP 05 12:33)  SBP      H 191mmHg  (SEP 05 12:33)  DBP      90 mmHg  (SEP 05 12:33)  SpO2      L 93%  (SEP 05 12:33)     General:  Alert and oriented, No acute distress.    Eye:  Pupils are equal, round and reactive to light, Normal conjunctiva.    HENT:  Normocephalic, No  pharyngeal erythema.    Respiratory:  Lungs are clear to auscultation, Respirations are non-labored, Breath sounds are equal.    Cardiovascular:  Normal rate, Regular rhythm, S1, S2.    Gastrointestinal:  Obese abdomen with central scar.  Soft nontender.    Musculoskeletal:  Normal range of motion, Normal strength, No tenderness, No swelling.    Integumentary:  Warm, Dry, Pink.    Neurologic:  Alert, Oriented, No focal deficits, Cranial Nerves II-XII are grossly intact.       Impression and Plan   76-year-old female with chronic intermittent epigastric pain.  Abnormal CT imaging noting gastric wall thickening.  Concern for unintentional weight loss especially given family history of esophageal cancer.    Discussionpatient has improved with supportive care since admission.    Recommendation  Advance diet  Continue Protonix 40 mg IV twice daily  Continue Carafate 1 g 4 times daily  Tentative plans for EGD on Tuesday  If patient is tolerating diet well then okay for discharge to home and we will schedule EGD later this week as an outpatient.      Professional Services   Thank you for allowing us to participate in this patient's care. Please don't hesitate to call if you have any questions or concerns.

## 2022-05-05 NOTE — HISTORICAL [facility] CERNER
This is a historical note converted from Jesus Manuel. Formatting and pictures may have been removed.  Please reference Jesus Manuel for original formatting and attached multimedia. Chief Complaint  pt to ER via [facility] for abd pain and n/v. ?started yesterday  History of Present Illness  76-year-old lady with a past medical history of HTN, MAYANK CPAP, vitamin D deficiency, DJD, history of multiple abdominal surgeries for ventral hernia with mesh.? Patient is brought in today to the ER with her daughter at bedside.? Patient states that since yesterday she has been having issues with abdominal pain epigastric and nausea vomiting unable to keep anything down.? Daughter at bedside tells me that this has been ongoing for about a year on and off.? Denies having any constipation or diarrhea.? She states that she has lost about 30 pounds in the last 6 months, early satiety, no blood noted in the stool, family history positive for father with esophagus cancer and brother with colon cancer.  Work-up in the ED revealing hypertensive urgency significantly elevated BP systolic in the 200s, CT abdomen pelvis showing significant gastric wall thickening as well as a incidentally noted partially calcified abdominal subcutaneous suspected large sebaceous cyst.  Review of Systems  Except as documented, all other systems reviewed and negative  Physical Exam  Vitals & Measurements  T:?35.8? ?C (Temporal Artery)? HR:?81(Peripheral)? HR:?82(Monitored)? RR:?22? BP:?194/89? SpO2:?95%?  Gen: AOx3, No acute distress, Afebrile, obese  Head: Normocephalic, Atraumatic  Eye: Normal conjuctiva, No scleral icterus  Heart: RRR, No M/G/R  Lungs: CTAB  GI: Palpable mass?tender?in the?infraumbilical area, midline incision?scar, no epigastric tenderness, No rebound tenderness, BS+  Musk: No LE edema, Normal LE ROM,?5/5 strength in LE  Neuro: EOMI, CN 2-12 grossly intact, Normal sensation  Integumentary: No rash, No cyanosis  Assessment/Plan  Orders:  amLODIPine, 10 mg,  form: Tab, Oral, Daily, first dose 09/04/21 13:55:00 CDT, STAT  cloNIDine, 0.1 mg, form: Tab, Oral, BID, first dose 09/04/21 13:55:00 CDT, STAT  hydrALAZINE, 50 mg, form: Tab, Oral, BID, first dose 09/04/21 13:55:00 CDT, STAT  spironolactone, 25 mg, form: Tab, Oral, Daily, first dose 09/04/21 13:55:00 CDT, STAT  traZODone, 50 mg, form: Tab, Oral, Once a day (at bedtime), first dose 09/04/21 13:56:00 CDT, STAT  valsartan, 320 mg, form: Tab, Oral, Daily, first dose 09/04/21 14:05:00 CDT  Gastric wall thickening-gastritis versus?underlying?malignancy  Intractable nausea and vomiting  Hypertensive urgency  ?  History of:?HTN, MAYANK on CPAP,?vitamin D deficiency,?DJD, multiple abdominal surgeries?for ventral hernia?with mesh  ?  ?  Surgery consulted from ED. ?Signed off no surgical intervention needed.  We will consult GI?for endoscopy evaluation given?symptoms of 1 year,?weight loss, family history of malignancy (father w/ esophagus ca and brother w/ colon ca),?early satiety  Supportive care with?Zofran and Phenergan as needed.? Slight IV hydration D5 half-normal.  Resume all antihypertensive home medications. ?Was unable to keep anything down at home.  Incidentally noted?subcutaneous?suspected cyst,?can be reevaluated outpatient if?is symptomatic for the patient.   Problem List/Past Medical History  Ongoing  Heart murmur  HTN (hypertension)(  Confirmed  )  Lumbar degenerative disc disease  Multilevel degenerative disc disease  Obesity(  Probable Diagnosis  )  MAYANK on CPAP  Osteoarthritis(  Confirmed  )  Vitamin D deficiency  Historical  No qualifying data  Procedure/Surgical History  colonoscopy (2006)  Cholecystectomy  Hernia repair   Medications  Inpatient  amlodipine 5 mg oral tablet, 10 mg= 2 tab(s), Oral, Daily  cloniDINE 0.1 mg oral tablet, 0.1 mg= 1 tab(s), Oral, BID  hydrALAZINE 50 mg oral tablet, 50 mg= 1 tab(s), Oral, BID  Sodium Chloride 0.9% intravenous solution 1,000 mL, 1000 mL, IV  spironolactone, 25  mg= 1 tab(s), Oral, Daily  traMADol, 50 mg= 1 tab(s), Oral, TID, PRN  traZODONE 50 mg oral tablet ( Desyrel ), 50 mg= 1 tab(s), Oral, Once a day (at bedtime)  valsartan, 320 mg= 4 tab(s), Oral, Daily  Home  acetaminophen-hydrocodone 325 mg-7.5 mg oral tablet, 1 tab(s), Oral, q6hr  albuterol CFC free 90 mcg/inh inhalation aerosol with adapter, 2 puff(s), INH, q6hr, PRN, 11 refills  amLODIPine 10 mg oral tablet, See Instructions, 3 refills  amoxicillin-clavulanate 875 mg-125 mg oral tablet, 875 mg, Oral, q12hr  aspirin 81 mg oral tablet, 81 mg= 1 tab(s), Oral, Daily, 3 refills  candesartan 32 mg oral tablet, 32 mg= 1 tab(s), Oral, Daily, 3 refills  cloniDINE 0.1 mg oral tablet, 0.1 mg= 1 tab(s), Oral, BID, 3 refills  docusate sodium 100 mg oral capsule, 100 mg= 1 cap(s), Oral, BID, 3 refills  hydrALAZINE 50 mg oral tablet, 50 mg= 1 tab(s), Oral, BID, 3 refills  loratadine 10 mg oral tablet, 10 mg= 1 tab(s), Oral, Daily, 3 refills  spironolactone 25 mg oral tablet, See Instructions, 3 refills  traMADol 50 mg oral tablet, 50 mg= 1 tab(s), Oral, TID, PRN,? ?Not taking  traMADol 50 mg oral tablet, 50 mg= 1 tab(s), Oral, TID, PRN,? ?Not taking  traMADol 50 mg oral tablet, 50 mg= 1 tab(s), Oral, TID, PRN  traZODONE 50 mg oral tablet ( Desyrel ), 50 mg= 1 tab(s), Oral, Once a day (at bedtime), 2 refills  Vitamin D3 5000 intl units (125 mcg) oral tablet, 5000 IntUnit= 1 tab(s), Oral, Daily, 1 refills  Allergies  sulfa drugs?(Hives)  Social History  Abuse/Neglect  No, No, Yes, 08/23/2021  No, No, Yes, 07/19/2021  Alcohol - Denies Alcohol Use, 07/08/2015  Never, 04/03/2021  Employment/School  Retired, 07/09/2018  Exercise  Exercise frequency: 1-2 times/week. Exercise type: Walking., 07/09/2018  Home/Environment  Lives with Children. Living situation: Home/Independent. Alcohol abuse in household: No. Substance abuse in household: No. Smoker in household: No. Injuries/Abuse/Neglect in household: No.,  07/09/2018  Nutrition/Health  Regular, LOW SODIUM, 07/09/2018  Sexual  Sexually active: No., 07/09/2018  Substance Use - Denies Substance Abuse, 07/08/2015  Never, 04/03/2021  Tobacco - Denies Tobacco Use, 07/08/2015  Never (less than 100 in lifetime), N/A, Household tobacco concerns: No. Smokeless Tobacco Use: Never., 08/23/2021  Never (less than 100 in lifetime), N/A, 07/19/2021  Family History  EMPHYSEMA: Brother.  Esophageal: Father.  Heart disease: Mother and Father.  Hypertension.: Mother and Father.  Primary malignant neoplasm of bone: Sister.  Stroke: Mother and Father.  Immunizations  Vaccine Date Status   zoster vaccine, inactivated 08/23/2021 Given   COVID-19 mRNA, LNP-S, PF - Moderna 02/24/2021 Recorded   COVID-19 mRNA, LNP-S, PF - Moderna 01/25/2021 Recorded   influenza virus vaccine, inactivated 12/11/2020 Given   tetanus/diphtheria/pertussis, acel(Tdap) 11/21/2019 Given   pneumococcal 23-polyvalent vaccine 08/02/2019 Given   influenza virus vaccine, inactivated 10/19/2018 Given   influenza virus vaccine, inactivated 12/28/2016 Given   influenza virus vaccine, inactivated 10/15/2015 Given   pneumococcal 13-valent conjugate vaccine 10/15/2015 Given   influenza virus vaccine, inactivated 02/13/2015 Recorded   influenza virus vaccine, inactivated 02/13/2015 Recorded   influenza virus vaccine, inactivated 10/03/2013 Recorded   influenza virus vaccine, inactivated 10/30/2012 Recorded   poliovirus vaccine, live, trivalent 03/02/1972 Recorded   poliovirus vaccine, live, trivalent 04/20/1960 Recorded   poliovirus vaccine, live, trivalent 12/05/1957 Recorded   poliovirus vaccine, live, trivalent 05/30/1957 Recorded   poliovirus vaccine, live, trivalent 05/02/1957 Recorded   Lab Results  General Lab  BUN: 13 mg/dL (09/04/21 08:23:00)  Creatinine: 0.84 mg/dL (09/04/21 08:23:00)  Glucose Lvl: 106 mg/dL (09/04/21 08:23:00)  Hct: 40.5 % (09/04/21 08:23:00)  Hgb: 12.2 gm/dL (09/04/21 08:23:00)  WBC: 9.7 x10(3)/mcL  (09/04/21 08:23:00)  Platelet: 270 x10(3)/mcL (09/04/21 08:23:00)  Potassium Lvl: 3.7 mmol/L (09/04/21 08:23:00)  Sodium Lvl: 140 mmol/L (09/04/21 08:23:00)  ?  Medications (8) Active  Scheduled: (6)  amlodipine 5 mg Tab UD ?10 mg 2 tab(s), Oral, Daily  cloniDINE 0.1 mg Tab UD ?0.1 mg 1 tab(s), Oral, BID  hydrALAzine 50 mg Tab UD ?50 mg 1 tab(s), Oral, BID  spironolactone 25 mg Tab UD ?25 mg 1 tab(s), Oral, Daily  trazodone 50 mg Tab ?50 mg 1 tab(s), Oral, Once a day (at bedtime)  valsartan 80 mg Tab ?320 mg 4 tab(s), Oral, Daily  Continuous: (1)  sodium chloride 0.9% 1,000 mL ?1,000 mL, IV  PRN: (1)  traMADOL 50 mg Tab UD ?50 mg 1 tab(s), Oral, TID  ?  Accession:?[id number]  Order:?CT Abdomen and Pelvis W Contrast  Report Dt/Tm:?09/04/2021 10:07  Report:?  CT ABDOMEN/PELVIS WITH CONTRAST 9/4/2021  ?  INDICATION: Abdominal Pain  ?  TECHNIQUE: Following the administration of intravenous contrast,  multiple axial CT images were obtained from the lung bases through the  symphysis pubis and reformatted in the coronal and sagittal planes.  Total  mGy*cm. Automated exposure control was utilized for this  examination as a radiation dose lowering technique.  ?  COMPARISON: None available.  ?  ?  FINDINGS:  Inferior heart size is within normal limits. No pericardial effusion.  Mitral valvular calcification.  ?  Dependent bibasilar subsegmental atelectatic changes of the imaged  lung bases. Benign punctate calcified left lower lobe granuloma. The  imaged lung bases are otherwise without focal consolidation,  suspicious pulmonary mass or nodule, pleural effusion, or  pneumothorax.  ?  The liver is normal in size and attenuation. Focal fatty infiltration  adjacent to the falciform ligament. No suspicious hepatic mass lesions  are otherwise identified. No intrahepatic/extrahepatic biliary ductal  dilatation. Status post cholecystectomy. No peripancreatic  inflammation or pancreatic ductal dilatation. The spleen is  within  normal limits.  ?  The adrenal glands without suspicious mass or nodule.  ?  The kidneys perfuse symmetrically. Multiple symmetric simple appearing  renal cortical cysts are seen bilaterally no suspicious cystic or  solid renal mass lesions are otherwise identified. No  nephroureterolithiasis, hydroureteronephrosis, or  perinephric/periureteral inflammation. The bilateral ureters are  unobstructed to a mildly distended urinary bladder, which is without  suspicious wall thickening or intraluminal mass lesion.  ?  No suspicious adnexal mass lesions. The uterus, adnexa, vagina, and  perineum are within normal limits.  ?  There is significant wall thickening and edema of the stomach, most  prominently of the antrum and pylorus. Findings are suspicious for  gastritis. The small and large bowel are without mechanical  obstruction or focal inflammation. The appendix and terminal ileum are  normal in appearance. No pathologically enlarged lymph nodes are  identified within the abdomen and pelvis. No free fluid or free air.  ?  The arteriovascular structures are without aneurysmal dilatation or  dissection. There is moderate calcific atherosclerosis of the  aortoiliac vessels and branches. The celiac axis and SMA are patent.  Patent main portal vein, splenic vein, and central SMV.  ?  A partially peripherally calcified 5.2 x 6.3 cm low attenuation  fluid-filled structure is identified within the subcutaneous soft  tissue of the left lower anterior abdominal wall (series 2, image 54).  The soft tissues are otherwise without acute process.  ?  Multilevel thoracolumbar spondylosis, with mild degenerative  anterolisthesis of L4 on L5. Significant reactive endplate changes at  L3-L4. Lumbar levoscoliosis. The remainder of the osseous structures  are otherwise without acute fracture, dislocation, or aggressive  appearing bone lesion.  ?  ?  IMPRESSION:  1. Significant gastric wall thickening and mural edema,  most  prominently of the antrum and pylorus, suspicious for gastritis.  ?  2. A partially peripherally calcified 5.2 x 6.3 cm low attenuation  fluid-filled structure of the left lower anterior abdominal wall  subcutaneous soft tissue is favored to represent a large sebaceous  cyst or other benign entity.  ?  3. Additional nonacute and incidental secondary findings, as detailed  above.  ?  ?

## 2022-05-05 NOTE — HISTORICAL [facility] CERN[facility]
This is a historical note converted from Jesus Manuel. Formatting and pictures may have been removed.  Please reference Cersharla for original formatting and attached multimedia. Chief Complaint  pt to ER via [facility] for abd pain and n/v. ?started yesterday  Reason for Consultation  CT imaging with calcified sebaceous cyst and n/v  History of Present Illness  75yo F w/ hx of HTN, obesity, MAYANK, and multiple abdominal surgeries for ventral hernias (including mesh placement) who presented to the ED for 1 day of n/v and abdominal pain. She states that the nausea and bilious vomiting and abdominal pain came on suddenly yesterday and has been persistent since that time. She has been unable to keep down any PO intake including daily medications. She has continued to have regular BMs, last was yesterday and was normal. Denies dysuria, melena, hematochezia, hematemesis, fevers, chills, worsening abdominal pain, weakness, dizziness, palpitations. Denies any previous bowel obstructions or obstructive like symptoms.?She is currently?hypertension to the 200s systolic. Workup does not demonstrate any evidence of leukocytosis and imaging shows calcified sebaceous cyst over midline incision but no evidence of bowel obstruction or hernia recurrence. Surgery was called to evaluate CT findings.  Review of Systems  negative except for HPI  Physical Exam  Vitals & Measurements  T:?35.8? ?C (Temporal Artery)? HR:?78(Peripheral)? HR:?81(Monitored)? RR:?14? BP:?255/116? SpO2:?97%?  NAD  RR, Hypertension with +  Breathing comfortably on room air  abdomen soft, obese, ND, TTP diffusely  alert and oriented  motor/sensation intact  Assessment/Plan  Abdominal pain?[id number]  75yo F w/ hx of HTN, obesity, MAYANK, and multiple abdominal surgeries for ventral hernias (including mesh placement) w/ 1 day of n/v and abdominal pain. Imaging with concern for gastritis and calcified midline sebaceous cyst.  - No need for acute  surgical intervention. Calcified cyst does not need surgical excision. ?  - Recommend supportive care, IVF, PRN anti-emetics  - Hypertension control  - Surgery will be available if any additional questions or concerns arise.  ?   Ness Sharma MD  [employer] General Surgery PGY 2  ?   ATTENDING:  ?   I was present with the resident / team?during rounds and examination.? Agree with plan as noted above.??   Problem List/Past Medical History  Ongoing  Heart murmur  HTN (hypertension)(  Confirmed  )  Lumbar degenerative disc disease  Multilevel degenerative disc disease  Obesity(  Probable Diagnosis  )  MAYANK on CPAP  Osteoarthritis(  Confirmed  )  Vitamin D deficiency  Historical  No qualifying data  Procedure/Surgical History  colonoscopy (2006)  Cholecystectomy  Hernia repair   Medications  Inpatient  Phenergan, 25 mg= 1 mL, IM, Once  Sodium Chloride 0.9% intravenous solution 1,000 mL, 1000 mL, IV  Zofran 2 mg/mL injectable solution, 4 mg= 2 mL, IV Push, Once, PRN  Home  acetaminophen-hydrocodone 325 mg-7.5 mg oral tablet, 1 tab(s), Oral, q6hr  albuterol CFC free 90 mcg/inh inhalation aerosol with adapter, 2 puff(s), INH, q6hr, PRN, 11 refills  amLODIPine 10 mg oral tablet, See Instructions, 3 refills  amoxicillin-clavulanate 875 mg-125 mg oral tablet, 875 mg, Oral, q12hr  aspirin 81 mg oral tablet, 81 mg= 1 tab(s), Oral, Daily, 3 refills  candesartan 32 mg oral tablet, 32 mg= 1 tab(s), Oral, Daily, 3 refills  cloniDINE 0.1 mg oral tablet, 0.1 mg= 1 tab(s), Oral, BID, 3 refills  docusate sodium 100 mg oral capsule, 100 mg= 1 cap(s), Oral, BID, 3 refills  hydrALAZINE 50 mg oral tablet, 50 mg= 1 tab(s), Oral, BID, 3 refills  loratadine 10 mg oral tablet, 10 mg= 1 tab(s), Oral, Daily, 3 refills  spironolactone 25 mg oral tablet, See Instructions, 3 refills  traMADol 50 mg oral tablet, 50 mg= 1 tab(s), Oral, TID, PRN,? ?Not taking  traMADol 50 mg oral tablet, 50 mg= 1 tab(s), Oral, TID, PRN,? ?Not taking  traMADol 50  mg oral tablet, 50 mg= 1 tab(s), Oral, TID, PRN  traZODONE 50 mg oral tablet ( Desyrel ), 50 mg= 1 tab(s), Oral, Once a day (at bedtime), 2 refills  Vitamin D3 5000 intl units (125 mcg) oral tablet, 5000 IntUnit= 1 tab(s), Oral, Daily, 1 refills  Allergies  sulfa drugs?(Hives)  Social History  Abuse/Neglect  No, No, Yes, 08/23/2021  No, No, Yes, 07/19/2021  Alcohol - Denies Alcohol Use, 07/08/2015  Never, 04/03/2021  Employment/School  Retired, 07/09/2018  Exercise  Exercise frequency: 1-2 times/week. Exercise type: Walking., 07/09/2018  Home/Environment  Lives with Children. Living situation: Home/Independent. Alcohol abuse in household: No. Substance abuse in household: No. Smoker in household: No. Injuries/Abuse/Neglect in household: No., 07/09/2018  Nutrition/Health  Regular, LOW SODIUM, 07/09/2018  Sexual  Sexually active: No., 07/09/2018  Substance Use - Denies Substance Abuse, 07/08/2015  Never, 04/03/2021  Tobacco - Denies Tobacco Use, 07/08/2015  Never (less than 100 in lifetime), N/A, Household tobacco concerns: No. Smokeless Tobacco Use: Never., 08/23/2021  Never (less than 100 in lifetime), N/A, 07/19/2021  Family History  EMPHYSEMA: Brother.  Esophageal: Father.  Heart disease: Mother and Father.  Hypertension.: Mother and Father.  Primary malignant neoplasm of bone: Sister.  Stroke: Mother and Father.  Immunizations  Vaccine Date Status   zoster vaccine, inactivated 08/23/2021 Given   COVID-19 mRNA, LNP-S, PF - Moderna 02/24/2021 Recorded   COVID-19 mRNA, LNP-S, PF - Moderna 01/25/2021 Recorded   influenza virus vaccine, inactivated 12/11/2020 Given   tetanus/diphtheria/pertussis, acel(Tdap) 11/21/2019 Given   pneumococcal 23-polyvalent vaccine 08/02/2019 Given   influenza virus vaccine, inactivated 10/19/2018 Given   influenza virus vaccine, inactivated 12/28/2016 Given   influenza virus vaccine, inactivated 10/15/2015 Given   pneumococcal 13-valent conjugate vaccine 10/15/2015 Given   influenza virus  vaccine, inactivated 02/13/2015 Recorded   influenza virus vaccine, inactivated 02/13/2015 Recorded   influenza virus vaccine, inactivated 10/03/2013 Recorded   influenza virus vaccine, inactivated 10/30/2012 Recorded   poliovirus vaccine, live, trivalent 03/02/1972 Recorded   poliovirus vaccine, live, trivalent 04/20/1960 Recorded   poliovirus vaccine, live, trivalent 12/05/1957 Recorded   poliovirus vaccine, live, trivalent 05/30/1957 Recorded   poliovirus vaccine, live, trivalent 05/02/1957 Recorded

## 2022-05-05 NOTE — HISTORICAL [facility] CERN[facility]
This is a historical note converted from Cersharla. Formatting and pictures may have been removed.  Please reference Cersharla for original formatting and attached multimedia. Admit and Discharge Dates  Admit Date: 09/04/2021  Discharge Date: 09/05/2021  Physicians  Attending Physician - Jose Alejandro HARRIS, Hardik MURDOCK  Admitting Physician - Jose Alejandro HARRIS, Hardik MURDOCK  Consulting Physician - Janay HARRIS , Ignacio MURPHY  Consulting Physician - Tj HARRIS, Dallas YARBROUGH  Primary Care Physician - [place] HARRIS, Nickolas LEVY  Discharge Diagnosis  ?  Gastric wall thickening-gastritis versus?underlying?malignancy  Intractable nausea and vomiting-resolved  Hypertensive urgency  ?   History of:?HTN, MAYANK on CPAP,?vitamin D deficiency,?DJD, multiple abdominal surgeries?for ventral hernia?with mesh [1]  Surgical Procedures  No procedures recorded for this visit.  Immunizations  No immunizations recorded for this visit.  Admission Information  76-year-old lady with a past medical history of HTN, MAYANK CPAP, vitamin D deficiency, DJD, history of multiple abdominal surgeries for ventral hernia with mesh.? Patient is brought in today to the ER with her daughter at bedside.? Patient states that since yesterday she has been having issues with abdominal pain epigastric and nausea vomiting unable to keep anything down.? Daughter at bedside tells me that this has been ongoing for about a year on and off.? Denies having any constipation or diarrhea.? She states that she has lost about 30 pounds in the last 6 months, early satiety, no blood noted in the stool, family history positive for father with esophagus cancer and brother with colon cancer.  Work-up in the ED revealing hypertensive urgency significantly elevated BP systolic in the 200s, CT abdomen pelvis showing significant gastric wall thickening as well as a incidentally noted partially calcified abdominal subcutaneous suspected large sebaceous cyst. [2]  ?  ?  ?  Admitted started on?supportive care along with Carafate and  Protonix. ?GI consulted seen by them this morning?and due to the holidays next available for an EGD would be on Tuesday. ?Since patient is doing well tolerating diet this morning?and no longer nauseated we will allow her to go back home with outpatient follow-up with GI. ?I did speak with?GI attending this morning and they will set her up as outpatient?for?endoscopy. ?She does have risk factors for?underlying malignancy so?she definitely would?need to be evaluated for that?given the CT findings.? At this time I will discharge her on Nexium twice daily?as well as Carafate?and?Zofran as needed.  Time Spent on discharge  35 minutes  Objective  Vitals & Measurements  HR:?61(Peripheral)? HR:?61(Monitored)? RR:?20? BP:?174/69? SpO2:?96%?  Physical Exam  Refer to progress note from day of discharge  Patient Discharge Condition  Improved and stable  Home   Discharge Medication Reconciliation  Prescribed  esomeprazole (esomeprazole 20 mg oral delayed release tablet)?20 mg, Oral, BID  ondansetron (Zofran 4 mg oral tablet)?4 mg, Oral, TID, PRN as needed for nausea/vomiting  sucralfate (Carafate 1 g oral tablet)?1 gm, Oral, QID  Continue  acetaminophen-HYDROcodone (acetaminophen-hydrocodone 325 mg-7.5 mg oral tablet)?1 tab(s), Oral, q6hr  albuterol (albuterol CFC free 90 mcg/inh inhalation aerosol with adapter)?2 puff(s), INH, q6hr, PRN as needed for wheezing  amLODIPine (amLODIPine 10 mg oral tablet)?See Instructions  aspirin (aspirin 81 mg oral tablet)?81 mg, Oral, Daily  candesartan (candesartan 32 mg oral tablet)?32 mg, Oral, Daily  cholecalciferol (Vitamin D3 5000 intl units (125 mcg) oral tablet)?5,000 IntUnit, Oral, Daily  cloNIDine (cloniDINE 0.1 mg oral tablet)?0.1 mg, Oral, BID  docusate (docusate sodium 100 mg oral capsule)?100 mg, Oral, BID  hydrALAZINE (hydrALAZINE 50 mg oral tablet)?50 mg, Oral, BID  loratadine (loratadine 10 mg oral tablet)?10 mg, Oral, Daily  spironolactone (spironolactone 25 mg oral tablet)?See  Instructions  traMADol (traMADol 50 mg oral tablet)?50 mg, Oral, TID, PRN for pain  traMADol (traMADol 50 mg oral tablet)?50 mg, Oral, TID, PRN pain  traMADol (traMADol 50 mg oral tablet)?50 mg, Oral, TID, PRN pain  traZODone (traZODONE 50 mg oral tablet ( Desyrel ))?50 mg, Oral, Once a day (at bedtime)  Discontinue  amoxicillin-clavulanate (amoxicillin-clavulanate 875 mg-125 mg oral tablet)?875 mg, Oral, q12hr  Education and Orders Provided  Discharge - 09/05/21 14:13:00 CDT, Home?  Follow up  Nickolas Conrad, within 1 week  Ignacio Gustafson MD, within 1 week  ????for Endoscopy  Car Seat Challenge  No Qualifying Data     [1]?Free Text Note; Hardik Blount MD 09/05/2021 11:28 CDT  [2]?Free Text Note; Hardik Blount MD 09/05/2021 11:28 CDT

## 2022-05-06 ENCOUNTER — TELEPHONE (OUTPATIENT)
Dept: FAMILY MEDICINE | Facility: CLINIC | Age: 77
End: 2022-05-06

## 2022-05-06 ENCOUNTER — OFFICE VISIT (OUTPATIENT)
Dept: FAMILY MEDICINE | Facility: CLINIC | Age: 77
End: 2022-05-06
Payer: COMMERCIAL

## 2022-05-06 VITALS
BODY MASS INDEX: 42.34 KG/M2 | TEMPERATURE: 99 F | HEIGHT: 64 IN | DIASTOLIC BLOOD PRESSURE: 80 MMHG | RESPIRATION RATE: 18 BRPM | HEART RATE: 81 BPM | SYSTOLIC BLOOD PRESSURE: 148 MMHG | WEIGHT: 248 LBS | OXYGEN SATURATION: 100 %

## 2022-05-06 DIAGNOSIS — M51.36 DEGENERATION OF INTERVERTEBRAL DISC OF LUMBAR REGION: Primary | ICD-10-CM

## 2022-05-06 DIAGNOSIS — E55.9 VITAMIN D DEFICIENCY: ICD-10-CM

## 2022-05-06 DIAGNOSIS — I10 PRIMARY HYPERTENSION: ICD-10-CM

## 2022-05-06 PROBLEM — E66.9 OBESITY: Status: ACTIVE | Noted: 2022-05-06

## 2022-05-06 PROBLEM — M51.369 DEGENERATION OF INTERVERTEBRAL DISC OF LUMBAR REGION: Status: ACTIVE | Noted: 2022-05-06

## 2022-05-06 PROBLEM — M19.90 OSTEOARTHRITIS: Status: ACTIVE | Noted: 2022-05-06

## 2022-05-06 PROBLEM — R01.1 HEART MURMUR: Status: ACTIVE | Noted: 2022-05-06

## 2022-05-06 PROBLEM — G47.33 OBSTRUCTIVE SLEEP APNEA SYNDROME: Status: ACTIVE | Noted: 2022-05-06

## 2022-05-06 PROCEDURE — 99215 OFFICE O/P EST HI 40 MIN: CPT | Mod: PBBFAC

## 2022-05-06 RX ORDER — ACETAMINOPHEN 500 MG
5000 TABLET ORAL
COMMUNITY
Start: 2021-06-08 | End: 2022-05-06 | Stop reason: DRUGHIGH

## 2022-05-06 RX ORDER — SUCRALFATE 1 G/1
TABLET ORAL
COMMUNITY
Start: 2022-05-05 | End: 2022-05-06 | Stop reason: SDUPTHER

## 2022-05-06 RX ORDER — TRAMADOL HYDROCHLORIDE 50 MG/1
50 TABLET ORAL 3 TIMES DAILY PRN
Qty: 90 TABLET | Refills: 2 | Status: SHIPPED | OUTPATIENT
Start: 2022-05-06 | End: 2022-05-06

## 2022-05-06 RX ORDER — CANDESARTAN 32 MG/1
32 TABLET ORAL DAILY
Qty: 90 TABLET | Refills: 3 | Status: SHIPPED | OUTPATIENT
Start: 2022-05-06 | End: 2022-10-17 | Stop reason: SDUPTHER

## 2022-05-06 RX ORDER — PROMETHAZINE HYDROCHLORIDE 25 MG/1
25 TABLET ORAL EVERY 6 HOURS PRN
COMMUNITY
End: 2022-05-06 | Stop reason: SDUPTHER

## 2022-05-06 RX ORDER — PROMETHAZINE HYDROCHLORIDE 25 MG/1
25 TABLET ORAL EVERY 6 HOURS PRN
Qty: 28 TABLET | Refills: 2 | Status: ON HOLD | OUTPATIENT
Start: 2022-05-06 | End: 2023-03-04 | Stop reason: HOSPADM

## 2022-05-06 RX ORDER — CHOLECALCIFEROL (VITAMIN D3) 25 MCG
2000 TABLET ORAL DAILY
COMMUNITY
End: 2022-10-17 | Stop reason: SDUPTHER

## 2022-05-06 RX ORDER — ASPIRIN 325 MG
325 TABLET, DELAYED RELEASE (ENTERIC COATED) ORAL
COMMUNITY
Start: 2021-09-23 | End: 2023-01-17 | Stop reason: SDUPTHER

## 2022-05-06 RX ORDER — CANDESARTAN 32 MG/1
32 TABLET ORAL
COMMUNITY
Start: 2022-02-09 | End: 2022-05-06 | Stop reason: SDUPTHER

## 2022-05-06 RX ORDER — FERROUS SULFATE 325(65) MG
325 TABLET ORAL
COMMUNITY
End: 2023-01-17 | Stop reason: SDUPTHER

## 2022-05-06 RX ORDER — SPIRONOLACTONE 25 MG/1
TABLET ORAL
COMMUNITY
Start: 2022-02-09 | End: 2022-05-06 | Stop reason: SDUPTHER

## 2022-05-06 RX ORDER — TRAMADOL HYDROCHLORIDE 50 MG/1
50 TABLET ORAL 3 TIMES DAILY PRN
COMMUNITY
Start: 2022-04-19 | End: 2022-05-06 | Stop reason: SDUPTHER

## 2022-05-06 RX ORDER — ZINC GLUCONATE 13.3 MG
200 LOZENGE ORAL 4 TIMES DAILY
COMMUNITY
End: 2023-01-17 | Stop reason: SDUPTHER

## 2022-05-06 RX ORDER — AMLODIPINE BESYLATE 10 MG/1
10 TABLET ORAL DAILY
Qty: 90 TABLET | Refills: 3 | Status: SHIPPED | OUTPATIENT
Start: 2022-05-06 | End: 2022-10-17 | Stop reason: SDUPTHER

## 2022-05-06 RX ORDER — AMLODIPINE BESYLATE 10 MG/1
TABLET ORAL
COMMUNITY
Start: 2022-02-09 | End: 2022-05-06 | Stop reason: SDUPTHER

## 2022-05-06 RX ORDER — TRAMADOL HYDROCHLORIDE 50 MG/1
50 TABLET ORAL 3 TIMES DAILY PRN
Qty: 90 TABLET | Refills: 2 | Status: SHIPPED | OUTPATIENT
Start: 2022-05-06 | End: 2022-05-06 | Stop reason: SDUPTHER

## 2022-05-06 RX ORDER — CLONIDINE HYDROCHLORIDE 0.1 MG/1
0.1 TABLET ORAL 2 TIMES DAILY
Qty: 180 TABLET | Refills: 3 | Status: SHIPPED | OUTPATIENT
Start: 2022-05-06 | End: 2022-09-19 | Stop reason: ALTCHOICE

## 2022-05-06 RX ORDER — LORATADINE 10 MG/1
10 TABLET ORAL DAILY
Qty: 90 TABLET | Refills: 3 | Status: SHIPPED | OUTPATIENT
Start: 2022-05-06 | End: 2022-07-20 | Stop reason: SDUPTHER

## 2022-05-06 RX ORDER — TRAMADOL HYDROCHLORIDE 50 MG/1
50 TABLET ORAL 3 TIMES DAILY PRN
Qty: 90 TABLET | Refills: 2 | Status: SHIPPED | OUTPATIENT
Start: 2022-05-06 | End: 2022-07-20 | Stop reason: SDUPTHER

## 2022-05-06 RX ORDER — FERROUS GLUCONATE 324(38)MG
324 TABLET ORAL
COMMUNITY
Start: 2021-12-07 | End: 2022-05-06 | Stop reason: DRUGHIGH

## 2022-05-06 RX ORDER — CLONIDINE HYDROCHLORIDE 0.1 MG/1
0.1 TABLET ORAL
COMMUNITY
Start: 2022-02-09 | End: 2022-05-06 | Stop reason: SDUPTHER

## 2022-05-06 RX ORDER — SPIRONOLACTONE 25 MG/1
25 TABLET ORAL DAILY
Qty: 90 TABLET | Refills: 3 | Status: SHIPPED | OUTPATIENT
Start: 2022-05-06 | End: 2022-10-17 | Stop reason: SDUPTHER

## 2022-05-06 RX ORDER — HYDRALAZINE HYDROCHLORIDE 50 MG/1
50 TABLET, FILM COATED ORAL EVERY 12 HOURS
Qty: 180 TABLET | Refills: 3 | Status: SHIPPED | OUTPATIENT
Start: 2022-05-06 | End: 2022-08-03 | Stop reason: SDUPTHER

## 2022-05-06 RX ORDER — LORATADINE 10 MG/1
10 TABLET ORAL
COMMUNITY
Start: 2021-06-08 | End: 2022-05-06 | Stop reason: SDUPTHER

## 2022-05-06 RX ORDER — HYDRALAZINE HYDROCHLORIDE 50 MG/1
50 TABLET, FILM COATED ORAL
COMMUNITY
Start: 2022-02-09 | End: 2022-05-06 | Stop reason: SDUPTHER

## 2022-05-06 RX ORDER — SUCRALFATE 1 G/1
1 TABLET ORAL
Qty: 120 TABLET | Refills: 2 | Status: SHIPPED | OUTPATIENT
Start: 2022-05-06 | End: 2022-10-17 | Stop reason: SDUPTHER

## 2022-05-06 NOTE — PROGRESS NOTES
Discussed with resident at time of encounter.  HPI reviewed.    PE  Gen: NAD, pleasant; ambulatory with use of cane.  HEENT: no carotid bruits.  Chest: lungs clear.  CV: reg. rhythm, 2/6 systolic murmur.    Agree with assessment.  Plan of care appropriate.  Professional services provided in an outpatient facility affiliated with a teaching institution.

## 2022-05-06 NOTE — PROGRESS NOTES
Family Medicine Clinic Note    Chief Complaint  Follow-up (No complaints)    History of Present Illness  Dayami Aleman is a 76 y.o. year old female with chronic lumbar disc disease, vitamin D deficiency, HTN, MAYANK, and systolic heart murmur who presents for routine f/u visit.     Current visit:  Denies any acute issues today.  BP mildly elevated on initial reading.    Admits to medication and dietary noncompliance recently. Did not take meds this a.m. Ate crawfish yesterday.     Chronic issues:  1. Multilevel lumbar degenerative disc disease - stable   2. HTN - as stated above  3. Vitamin D deficiency - last Vit D check 2/2022, level 84.5. Taking daily 5000IU.  4. MAYANK - wears nightly CPAP, awakes feeling well rested.   5. Heart murmur - asymptomatic, Echo 8/19/21. LVEF 55-60%, mild tricuspid and pulmonic regurg.     Review of Systems  Review of Systems   Constitutional: Negative for chills, fever and malaise/fatigue.   Respiratory: Negative for cough, hemoptysis and shortness of breath.    Cardiovascular: Negative for chest pain, palpitations, orthopnea and leg swelling.   Gastrointestinal: Negative for abdominal pain, constipation, diarrhea, nausea and vomiting.   Genitourinary: Negative for dysuria and urgency.   Musculoskeletal: Positive for back pain (chronic ).   Skin: Negative for rash.   Neurological: Negative for dizziness, weakness and headaches.   Psychiatric/Behavioral: Negative for depression and suicidal ideas.       Objective    Vitals:    05/06/22 0855   BP: (!) 148/80   Pulse: 81   Resp:    Temp: 98.6 F     Physical Exam  Constitutional:       Appearance: Normal appearance.   Cardiovascular:      Rate and Rhythm: Normal rate and regular rhythm.      Heart sounds: Murmur heard.   Pulmonary:      Effort: Pulmonary effort is normal.      Breath sounds: Normal breath sounds.   Abdominal:      General: Abdomen is flat. Bowel sounds are normal.      Palpations: Abdomen is soft.   Musculoskeletal:          General: No swelling or tenderness.      Right lower leg: No edema.      Left lower leg: No edema.   Skin:     General: Skin is warm and dry.      Capillary Refill: Capillary refill takes less than 2 seconds.      Findings: No bruising.         Assessment / Plan  1. HTN  2. Vitamin D deficiency  3. Lumbar degenerative disc disease    -manual BP recheck at goal.  Encourage medication and dietary compliance.  -Vit D above goal, decrease daily supplement to 2000 IU.   - reviewed. Tramadol refilled, unable to wean at this time.   -All medications refilled as requested.      Follow up:  RTC in 11 weeks    Nickolas Conrad M.D. [facility]

## 2022-07-19 NOTE — PROGRESS NOTES
[facility] OFFICE VISIT NOTE  MRN: [id number]  Date: 07/19/2022    Chief Complaint: Medication Refill (All prescriptions)    Subjective:    HPI  Dayami Aleman is a 77 y.o. female  presenting to [facility] for medication refill.    Acute complaints: denies. States that she is doing well, has baseline pain in her back (2-4/10) when takes her medication. It is tolerable. Requesting for her Tramadol to be refilled. Also need her loratadine refilled. From past visit review BP has been mildly elevated. Today upon intake, automatic BP was 190/64. It was repeated, now 160 systolic. States that she usually takes her BP meds at 10AM. Does not have BP log. She reports intermittent compliance with CPAP these past couple of weeks. She denies any HA, SOB, chest pain, palpitations, nausea, vomiting, abdominal pain.    Chronic conditions:   -Multilevel lumbar degenerative disc disease: per CT L spine on 10/2019. No acute changes in character of pain, no red flag symptoms. Tramadol 50mg TID. PEG 2. No PT since COVID, previously seen at [place] - no desire to return.  -HTN: see above notes  -Vitamin D deficiency: last Vit D check 2/2022, level 84.5. Taking daily 5000IU.  -Asthma - stable, no recent use of inhaler;  -MAYANK: see comments above   -Heart murmur - asymptomatic, Echo 8/19/21. LVEF 55-60%, mild tricuspid and pulmonic regurg.   -Seasonal allergies - meds: Claritin 10 mg daily. Refilled today (7/20/22)    --------------------------------------------------------------  CONTROLLED MEDICATION EVALUATION  Continuity Resident Physician: Agata Chiu MD  Medication start date: February 2016    List all CONTROLLED medications that patient is currently taking with dosage. (Including but not limited to amphetamines, opiates, benzodiazepine, and sedatives):  Tramadol 50mg TID since 12/11/2020    List all illicit substances patient currently reports using:None    New psychosocial difficulties reported by patient:None    List radiological  studies related to diagnosis with dates and pertinent results: None    List adjunctive therapies that have been considered:PT at [facility] w/ little relief. No desire by patient to return.    Physical exam components that do or do not correlate with patient complaints:Low back pain, obesity    Prescriptions and dosages of medications prescribed at this visit:Tramadol 50mg TID    Name, Dosage, Sig and Number of Controlled substance prescribed today:  Tramadol 50mg TID #30 TABS  Tramadol 50mg TID #30 TABS  Tramadol 50mg TID #30 TABS    Date of Signed Patient Contract for Controlled Substance: 2/14/2020    Date of Last : 2/9/2022    Date of last UDS: 9/11/2020 - all negative; will collect today    Has detox been offered to the patient: not applicable    PEG assessment: 2 (2/9/2022)    Health Maintenance   Topic Date Due    Hepatitis C Screening  Never done    TETANUS VACCINE  Never done    DEXA Scan  05/16/2019    Lipid Panel  10/19/2023       Review of Systems  Constitutional: no fever, no chills  CV: no chest pain, no palpitations  Resp: no shortness of breath, no cough, no wheezing  GI: no nausea, no vomiting, no constipation, no diarrhea  : no dysuria, no increased frequency, no fowl odor to urine  Neuro: No headache, no dizziness, no lightheadedness  MSK: +back pain, no joint pain or swelling  Skin: no rash    Current Medications:   Current Outpatient Medications   Medication Sig Dispense Refill    amLODIPine (NORVASC) 10 MG tablet Take 1 tablet (10 mg total) by mouth once daily. 90 tablet 3    aspirin (ECOTRIN) 325 MG EC tablet Take 325 mg by mouth.      candesartan (ATACAND) 32 MG tablet Take 1 tablet (32 mg total) by mouth once daily. 90 tablet 3    cimetidine (TAGAMET) 200 MG tablet Take 200 mg by mouth 4 (four) times daily.      cloNIDine (CATAPRES) 0.1 MG tablet Take 1 tablet (0.1 mg total) by mouth 2 (two) times daily. 180 tablet 3    ferrous sulfate (FEOSOL) 325 mg (65 mg iron)  "Tab tablet Take 325 mg by mouth daily with breakfast.      hydrALAZINE (APRESOLINE) 50 MG tablet Take 1 tablet (50 mg total) by mouth every 12 (twelve) hours. 180 tablet 3    loratadine (CLARITIN) 10 mg tablet Take 1 tablet (10 mg total) by mouth once daily. 90 tablet 3    promethazine (PHENERGAN) 25 MG tablet Take 1 tablet (25 mg total) by mouth every 6 (six) hours as needed (nausea and vomiting). 28 tablet 2    spironolactone (ALDACTONE) 25 MG tablet Take 1 tablet (25 mg total) by mouth once daily. 90 tablet 3    sucralfate (CARAFATE) 1 gram tablet Take 1 tablet (1 g total) by mouth 4 (four) times daily before meals and nightly. 120 tablet 2    traMADoL (ULTRAM) 50 mg tablet Take 1 tablet (50 mg total) by mouth 3 (three) times daily as needed for Pain. 90 tablet 2    vitamin D (VITAMIN D3) 1000 units Tab Take 2,000 Units by mouth once daily.       No current facility-administered medications for this visit.       Objective:  Blood pressure (!) 162/90, pulse 74, temperature 98.9 °F (37.2 °C), temperature source Oral, resp. rate 19, height 5' 3.78" (1.62 m), weight 117.5 kg (259 lb), SpO2 99 %.   Physical Exam  General: in no acute distress  Respiratory: clear to auscultation bilaterally. No wheezes, rhonchi, or rales.  Cardiovascular: regular rate and rhythm. S1/S2 present. No murmurs, gallops or rubs appreciated  Musculoskeletal: Tenderness to palpation over lumbar spine and paraspinal musculature. No overlying skin changes. Patient ambulates with cane.   Extremities: Trace pitting bilateral ankle edema  Neurologic: Alert and oriented x3    Assessment:   1. Degeneration of intervertebral disc of lumbar region    2. Primary hypertension    3. Allergic rhinitis, unspecified seasonality, unspecified trigger        Plan:  - reviewed   -UDS today, results pending  -BP elevated. Given BP log, dietary precautions. Bring logs to next office visit in 2 weeks.  -Patient may require medication adjustment w/ " possible referral to cardiology if current meds are not controlling her BP   -Encouraged to wear her CPAP nightly. Risk and benefits of CPAP use/noncompliance discussed. Patient expressed understanding and will use nightly. Assess compliance at next visit in 2 weeks.   -Patient declined XR bone density, FIT, MMG, and vaccinations as of 7/19/2021-discussion on her stance on preventative screening should be addressed at the next routine visit.  -At next appointment she should be scheduled with PCP in order for pain contract to be resigned.   -Strict ER precaution for elevated BP (>180/110) and/or with symptoms of end organ damage (numbness/tingling, CP, blurry vision, SOB on exertion/at rest, facial drooping, dysarthria).      Return to clinic in 2 weeks for blood pressure check. May schedule earlier appointment if needed.     Radha Chavis MD  [facility], HO-3

## 2022-07-20 ENCOUNTER — OFFICE VISIT (OUTPATIENT)
Dept: FAMILY MEDICINE | Facility: CLINIC | Age: 77
End: 2022-07-20
Payer: COMMERCIAL

## 2022-07-20 VITALS
SYSTOLIC BLOOD PRESSURE: 162 MMHG | WEIGHT: 259 LBS | TEMPERATURE: 99 F | DIASTOLIC BLOOD PRESSURE: 90 MMHG | OXYGEN SATURATION: 99 % | HEART RATE: 74 BPM | BODY MASS INDEX: 44.22 KG/M2 | RESPIRATION RATE: 19 BRPM | HEIGHT: 64 IN

## 2022-07-20 DIAGNOSIS — I10 PRIMARY HYPERTENSION: ICD-10-CM

## 2022-07-20 DIAGNOSIS — J30.9 ALLERGIC RHINITIS, UNSPECIFIED SEASONALITY, UNSPECIFIED TRIGGER: ICD-10-CM

## 2022-07-20 DIAGNOSIS — M51.36 DEGENERATION OF INTERVERTEBRAL DISC OF LUMBAR REGION: Primary | ICD-10-CM

## 2022-07-20 LAB
AMPHET UR QL SCN: NEGATIVE
BARBITURATE SCN PRESENT UR: NEGATIVE
BENZODIAZ UR QL SCN: NEGATIVE
CANNABINOIDS UR QL SCN: NEGATIVE
COCAINE UR QL SCN: NEGATIVE
FENTANYL UR QL SCN: NEGATIVE
MDMA UR QL SCN: NEGATIVE
OPIATES UR QL SCN: NEGATIVE
PCP UR QL: NEGATIVE
PH UR: 6.5 [PH] (ref 3–11)
SPECIFIC GRAVITY, URINE AUTO (.000) (OHS): 1.02 (ref 1–1.03)

## 2022-07-20 PROCEDURE — 80307 DRUG TEST PRSMV CHEM ANLYZR: CPT

## 2022-07-20 PROCEDURE — 99215 OFFICE O/P EST HI 40 MIN: CPT | Mod: PBBFAC

## 2022-07-20 RX ORDER — LORATADINE 10 MG/1
10 TABLET ORAL DAILY
Qty: 30 TABLET | Refills: 2 | Status: SHIPPED | OUTPATIENT
Start: 2022-07-20 | End: 2022-10-17 | Stop reason: SDUPTHER

## 2022-07-20 RX ORDER — TRAMADOL HYDROCHLORIDE 50 MG/1
50 TABLET ORAL 3 TIMES DAILY PRN
Qty: 90 TABLET | Refills: 2 | Status: SHIPPED | OUTPATIENT
Start: 2022-07-20 | End: 2022-10-17 | Stop reason: SDUPTHER

## 2022-08-03 ENCOUNTER — OFFICE VISIT (OUTPATIENT)
Dept: FAMILY MEDICINE | Facility: CLINIC | Age: 77
End: 2022-08-03
Payer: COMMERCIAL

## 2022-08-03 VITALS
HEIGHT: 63 IN | TEMPERATURE: 99 F | SYSTOLIC BLOOD PRESSURE: 134 MMHG | RESPIRATION RATE: 18 BRPM | HEART RATE: 82 BPM | OXYGEN SATURATION: 99 % | WEIGHT: 256.19 LBS | BODY MASS INDEX: 45.39 KG/M2 | DIASTOLIC BLOOD PRESSURE: 61 MMHG

## 2022-08-03 DIAGNOSIS — I10 PRIMARY HYPERTENSION: ICD-10-CM

## 2022-08-03 DIAGNOSIS — G47.33 OBSTRUCTIVE SLEEP APNEA SYNDROME: Primary | ICD-10-CM

## 2022-08-03 PROCEDURE — 99213 OFFICE O/P EST LOW 20 MIN: CPT | Mod: PBBFAC

## 2022-08-03 RX ORDER — HYDRALAZINE HYDROCHLORIDE 50 MG/1
50 TABLET, FILM COATED ORAL EVERY 8 HOURS
Qty: 90 TABLET | Refills: 0 | Status: SHIPPED | OUTPATIENT
Start: 2022-08-03 | End: 2022-10-17 | Stop reason: SDUPTHER

## 2022-08-03 NOTE — PROGRESS NOTES
"Subjective:       Patient ID: Dayami Aleman is a 77 y.o. female.    Chief Complaint: f/u for BP check    HPI   78yo F presents for a BP check.    Patient was seen in office about 2 weeks ago, during that visit her BP was 162/90. Patient was not checking BP at home prior to that visit. Today patient came in for a BP check. She has been checking BP at home, BP losg scanned into the chart. Advised patient that her BP goal is <150/90, she had several readings above goal. She reports being compliant with all of her BP medications. BP at goal in office today. Patient also has Hx of sleep apnea and has  CPAP. Reports inconsistent use of CPAP because the machine give her a "cold" every time she uses it. Reports cleaning the device often and still gets a "cold" when she uses the device.    Review of Systems  Constitutional: no fever, no chills, no headaches  Resp: No SOB, no cough, no difficulty breathing  CVS: No Chest pain, No palpitations, no edema  GI: No nausea, no vomiting, no diarrhea, no abdominal pain  Neuro: No syncope, no dizziness, no paraesthesias  Objective:   Blood pressure 134/61, pulse 82, temperature 98.6 °F (37 °C), temperature source Oral, resp. rate 18, height 5' 3" (1.6 m), weight 116.2 kg (256 lb 2.8 oz), SpO2 99 %.    Physical Exam    General: Well developed, no acute distress  HEENT: oral mucosa moist, no pharyngeal erythema. EOMI  CV: Regular rate rhythm, no edema, 2+ peripheral pulses  Resp: Non-labored breathing, symmetrical chest expansion bilaterally  Neuro: AAOx4, no focal motor deficits, no focal sensory deficits  Assessment/Plan:       Problem List Items Addressed This Visit        1 - High    Hypertension     -Increased Hydralizine to TID  -Advised patient to continue checking BP daily, educated patient on proper procedures for checking BP  -Will send referral for new sleep study, as inconsistent use of CPAP machine may be contributing to persistent hypertension.              " Unprioritized    Obstructive sleep apnea syndrome - Primary    Relevant Orders    Ambulatory referral/consult to Sleep Disorders            Agata Chiu MD  [employer] FM  PGY-3

## 2022-08-04 NOTE — ASSESSMENT & PLAN NOTE
-Increased Hydralizine to TID  -Advised patient to continue checking BP daily, educated patient on proper procedures for checking BP  -Will send referral for new sleep study, as inconsistent use of CPAP machine may be contributing to persistent hypertension.

## 2022-08-17 DIAGNOSIS — G47.33 OSA (OBSTRUCTIVE SLEEP APNEA): Primary | ICD-10-CM

## 2022-09-19 ENCOUNTER — OFFICE VISIT (OUTPATIENT)
Dept: FAMILY MEDICINE | Facility: CLINIC | Age: 77
End: 2022-09-19
Payer: COMMERCIAL

## 2022-09-19 VITALS
RESPIRATION RATE: 18 BRPM | TEMPERATURE: 98 F | HEIGHT: 63 IN | BODY MASS INDEX: 44.92 KG/M2 | OXYGEN SATURATION: 98 % | DIASTOLIC BLOOD PRESSURE: 80 MMHG | SYSTOLIC BLOOD PRESSURE: 142 MMHG | WEIGHT: 253.5 LBS | HEART RATE: 71 BPM

## 2022-09-19 DIAGNOSIS — E66.9 OBESITY, UNSPECIFIED CLASSIFICATION, UNSPECIFIED OBESITY TYPE, UNSPECIFIED WHETHER SERIOUS COMORBIDITY PRESENT: Primary | ICD-10-CM

## 2022-09-19 DIAGNOSIS — I10 PRIMARY HYPERTENSION: ICD-10-CM

## 2022-09-19 PROCEDURE — 99214 OFFICE O/P EST MOD 30 MIN: CPT | Mod: PBBFAC

## 2022-09-19 NOTE — PROGRESS NOTES
[facility] FM Clinic Progress Note    ID:  Dayami Aleman   MRN:  [id number]     9/19/2022    Chief Complaint:  BP follow up    History of Present Illness:  Dayami Aleman is a 77 y.o. female who presents to [facility] FM clinic for      Last seen 8/16/22, found to have elevated BP. Hydralazine frequency increased to TID. Did not take meds this AM because they make her drowsy. No headaches, dizziness, edema    Acute Issues: no complaints    Chronic Issues:  HTN- see above  Chronic pain  Seasonal Allergies  Iron deficiency  Vit D deficiency        Current Outpatient Medications:     amLODIPine (NORVASC) 10 MG tablet, Take 1 tablet (10 mg total) by mouth once daily., Disp: 90 tablet, Rfl: 3    aspirin (ECOTRIN) 325 MG EC tablet, Take 325 mg by mouth., Disp: , Rfl:     candesartan (ATACAND) 32 MG tablet, Take 1 tablet (32 mg total) by mouth once daily., Disp: 90 tablet, Rfl: 3    cimetidine (TAGAMET) 200 MG tablet, Take 200 mg by mouth 4 (four) times daily., Disp: , Rfl:     ferrous sulfate (FEOSOL) 325 mg (65 mg iron) Tab tablet, Take 325 mg by mouth daily with breakfast., Disp: , Rfl:     hydrALAZINE (APRESOLINE) 50 MG tablet, Take 1 tablet (50 mg total) by mouth every 8 (eight) hours., Disp: 90 tablet, Rfl: 0    loratadine (CLARITIN) 10 mg tablet, Take 1 tablet (10 mg total) by mouth once daily., Disp: 30 tablet, Rfl: 2    promethazine (PHENERGAN) 25 MG tablet, Take 1 tablet (25 mg total) by mouth every 6 (six) hours as needed (nausea and vomiting)., Disp: 28 tablet, Rfl: 2    spironolactone (ALDACTONE) 25 MG tablet, Take 1 tablet (25 mg total) by mouth once daily., Disp: 90 tablet, Rfl: 3    sucralfate (CARAFATE) 1 gram tablet, Take 1 tablet (1 g total) by mouth 4 (four) times daily before meals and nightly., Disp: 120 tablet, Rfl: 2    traMADoL (ULTRAM) 50 mg tablet, Take 1 tablet (50 mg total) by mouth 3 (three) times daily as needed for Pain., Disp: 90 tablet, Rfl: 2    vitamin D (VITAMIN D3) 1000 units Tab, Take 2,000  "Units by mouth once daily., Disp: , Rfl:     Review of patient's allergies indicates:   Allergen Reactions    Sulfamethoxazole-trimethoprim Hives         Review of Systems:  See HPI      Physical Exam:  BP (!) 142/80 (BP Location: Left arm, Patient Position: Sitting, BP Method: Medium (Manual))   Pulse 71   Temp 98.1 °F (36.7 °C) (Oral)   Resp 18   Ht 5' 2.99" (1.6 m)   Wt 115 kg (253 lb 8.5 oz)   SpO2 98%   BMI 44.92 kg/m²     Gen- well appearing  CV- RRR, 3/6 systolic murmur. 2+ pulses BL DP and radial. No BARBRA  Resp-  Nonlabored breathing  Skin- no rashes or skin breakdown  Neuro- AAOx4        Assessment/Plan:    HTN   - Controlled for age  - STOP Clonidine 0.1 mg BID  - Continue remaining meds  - Bring BP logs to clinic  - Can up titrate hydralazine pending BP logs      - Patient defers labs to follow up appt. CMP, CBC, lipid, A1C, urine    Mason Jackson MD  [employer]  Resident HO2       "

## 2022-09-27 NOTE — PROGRESS NOTES
Faculty addendum: Patient discussed with resident. Chart was reviewed including vitals, labs, etc. Care provided reasonable and necessary. I participated in the management of the patient and was immediately available throughout the encounter. Services were furnished in a primary care center located in the outpatient department of a [facility] hospital. I agree with the resident's findings and plan as documented in the resident's note.

## 2022-10-17 ENCOUNTER — OFFICE VISIT (OUTPATIENT)
Dept: FAMILY MEDICINE | Facility: CLINIC | Age: 77
End: 2022-10-17
Payer: COMMERCIAL

## 2022-10-17 VITALS
WEIGHT: 257.25 LBS | HEIGHT: 62 IN | SYSTOLIC BLOOD PRESSURE: 136 MMHG | BODY MASS INDEX: 47.34 KG/M2 | HEART RATE: 88 BPM | RESPIRATION RATE: 18 BRPM | DIASTOLIC BLOOD PRESSURE: 72 MMHG | OXYGEN SATURATION: 98 % | TEMPERATURE: 99 F

## 2022-10-17 DIAGNOSIS — E55.9 VITAMIN D DEFICIENCY: ICD-10-CM

## 2022-10-17 DIAGNOSIS — R73.03 PREDIABETES: ICD-10-CM

## 2022-10-17 DIAGNOSIS — J30.1 SEASONAL ALLERGIC RHINITIS DUE TO POLLEN: ICD-10-CM

## 2022-10-17 DIAGNOSIS — Z23 IMMUNIZATION DUE: ICD-10-CM

## 2022-10-17 DIAGNOSIS — I10 PRIMARY HYPERTENSION: Primary | ICD-10-CM

## 2022-10-17 DIAGNOSIS — K21.9 GASTROESOPHAGEAL REFLUX DISEASE, UNSPECIFIED WHETHER ESOPHAGITIS PRESENT: ICD-10-CM

## 2022-10-17 DIAGNOSIS — M51.36 DEGENERATION OF INTERVERTEBRAL DISC OF LUMBAR REGION: ICD-10-CM

## 2022-10-17 DIAGNOSIS — E66.9 OBESITY, UNSPECIFIED CLASSIFICATION, UNSPECIFIED OBESITY TYPE, UNSPECIFIED WHETHER SERIOUS COMORBIDITY PRESENT: ICD-10-CM

## 2022-10-17 LAB
ALBUMIN SERPL-MCNC: 3.9 GM/DL (ref 3.4–4.8)
ALBUMIN/GLOB SERPL: 0.9 RATIO (ref 1.1–2)
ALP SERPL-CCNC: 92 UNIT/L (ref 40–150)
ALT SERPL-CCNC: 9 UNIT/L (ref 0–55)
AST SERPL-CCNC: 10 UNIT/L (ref 5–34)
BASOPHILS # BLD AUTO: 0.07 X10(3)/MCL (ref 0–0.2)
BASOPHILS NFR BLD AUTO: 0.8 %
BILIRUBIN DIRECT+TOT PNL SERPL-MCNC: 0.2 MG/DL
BUN SERPL-MCNC: 22.3 MG/DL (ref 9.8–20.1)
CALCIUM SERPL-MCNC: 9.7 MG/DL (ref 8.4–10.2)
CHLORIDE SERPL-SCNC: 101 MMOL/L (ref 98–107)
CO2 SERPL-SCNC: 27 MMOL/L (ref 23–31)
CREAT SERPL-MCNC: 1.32 MG/DL (ref 0.55–1.02)
CREAT UR-MCNC: 123.2 MG/DL (ref 47–110)
DEPRECATED CALCIDIOL+CALCIFEROL SERPL-MC: 66.3 NG/ML (ref 30–80)
EOSINOPHIL # BLD AUTO: 0.3 X10(3)/MCL (ref 0–0.9)
EOSINOPHIL NFR BLD AUTO: 3.6 %
ERYTHROCYTE [DISTWIDTH] IN BLOOD BY AUTOMATED COUNT: 17.8 % (ref 11.5–17)
EST. AVERAGE GLUCOSE BLD GHB EST-MCNC: 122.6 MG/DL
GFR SERPLBLD CREATININE-BSD FMLA CKD-EPI: 41 MLS/MIN/1.73/M2
GLOBULIN SER-MCNC: 4.2 GM/DL (ref 2.4–3.5)
GLUCOSE SERPL-MCNC: 96 MG/DL (ref 82–115)
HBA1C MFR BLD: 5.9 %
HCT VFR BLD AUTO: 38.1 % (ref 37–47)
HGB BLD-MCNC: 11.6 GM/DL (ref 12–16)
IMM GRANULOCYTES # BLD AUTO: 0.01 X10(3)/MCL (ref 0–0.04)
IMM GRANULOCYTES NFR BLD AUTO: 0.1 %
LYMPHOCYTES # BLD AUTO: 3.5 X10(3)/MCL (ref 0.6–4.6)
LYMPHOCYTES NFR BLD AUTO: 41.4 %
MCH RBC QN AUTO: 23.9 PG (ref 27–31)
MCHC RBC AUTO-ENTMCNC: 30.4 MG/DL (ref 33–36)
MCV RBC AUTO: 78.6 FL (ref 80–94)
MICROALBUMIN UR-MCNC: 10.9 UG/ML
MICROALBUMIN/CREAT RATIO PNL UR: 8.8 MG/GM CR (ref 0–30)
MONOCYTES # BLD AUTO: 0.85 X10(3)/MCL (ref 0.1–1.3)
MONOCYTES NFR BLD AUTO: 10.1 %
NEUTROPHILS # BLD AUTO: 3.7 X10(3)/MCL (ref 2.1–9.2)
NEUTROPHILS NFR BLD AUTO: 44 %
NRBC BLD AUTO-RTO: 0 %
PLATELET # BLD AUTO: 282 X10(3)/MCL (ref 130–400)
PMV BLD AUTO: 11.2 FL (ref 7.4–10.4)
POTASSIUM SERPL-SCNC: 4.6 MMOL/L (ref 3.5–5.1)
PROT SERPL-MCNC: 8.1 GM/DL (ref 5.8–7.6)
RBC # BLD AUTO: 4.85 X10(6)/MCL (ref 4.2–5.4)
SODIUM SERPL-SCNC: 141 MMOL/L (ref 136–145)
TSH SERPL-ACNC: 3.15 UIU/ML (ref 0.35–4.94)
WBC # SPEC AUTO: 8.5 X10(3)/MCL (ref 4.5–11.5)

## 2022-10-17 PROCEDURE — G0008 ADMIN INFLUENZA VIRUS VAC: HCPCS | Mod: PBBFAC

## 2022-10-17 PROCEDURE — 82088 ASSAY OF ALDOSTERONE: CPT

## 2022-10-17 PROCEDURE — 99213 OFFICE O/P EST LOW 20 MIN: CPT | Mod: PBBFAC,25

## 2022-10-17 PROCEDURE — 36415 COLL VENOUS BLD VENIPUNCTURE: CPT

## 2022-10-17 PROCEDURE — 82043 UR ALBUMIN QUANTITATIVE: CPT

## 2022-10-17 PROCEDURE — 85025 COMPLETE CBC W/AUTO DIFF WBC: CPT

## 2022-10-17 PROCEDURE — 83036 HEMOGLOBIN GLYCOSYLATED A1C: CPT

## 2022-10-17 PROCEDURE — 84443 ASSAY THYROID STIM HORMONE: CPT

## 2022-10-17 PROCEDURE — 80053 COMPREHEN METABOLIC PANEL: CPT

## 2022-10-17 PROCEDURE — 82306 VITAMIN D 25 HYDROXY: CPT

## 2022-10-17 PROCEDURE — 84244 ASSAY OF RENIN: CPT

## 2022-10-17 RX ORDER — SUCRALFATE 1 G/1
1 TABLET ORAL
Qty: 120 TABLET | Refills: 2 | Status: SHIPPED | OUTPATIENT
Start: 2022-10-17 | End: 2023-01-17 | Stop reason: SDUPTHER

## 2022-10-17 RX ORDER — TRAMADOL HYDROCHLORIDE 50 MG/1
50 TABLET ORAL 3 TIMES DAILY PRN
Qty: 90 TABLET | Refills: 2 | Status: SHIPPED | OUTPATIENT
Start: 2022-10-21 | End: 2022-11-15 | Stop reason: SDUPTHER

## 2022-10-17 RX ORDER — SPIRONOLACTONE 25 MG/1
25 TABLET ORAL DAILY
Qty: 90 TABLET | Refills: 3 | Status: SHIPPED | OUTPATIENT
Start: 2022-10-17 | End: 2023-01-17 | Stop reason: SDUPTHER

## 2022-10-17 RX ORDER — AMLODIPINE BESYLATE 10 MG/1
10 TABLET ORAL DAILY
Qty: 90 TABLET | Refills: 3 | Status: SHIPPED | OUTPATIENT
Start: 2022-10-17 | End: 2023-01-17 | Stop reason: SDUPTHER

## 2022-10-17 RX ORDER — SUCRALFATE 1 G/1
1 TABLET ORAL 4 TIMES DAILY
Qty: 28 TABLET | Refills: 0 | Status: SHIPPED | OUTPATIENT
Start: 2022-10-17 | End: 2022-10-24

## 2022-10-17 RX ORDER — LORATADINE 10 MG/1
10 TABLET ORAL DAILY
Qty: 30 TABLET | Refills: 2 | Status: SHIPPED | OUTPATIENT
Start: 2022-10-17 | End: 2023-01-17 | Stop reason: SDUPTHER

## 2022-10-17 RX ORDER — HYDRALAZINE HYDROCHLORIDE 50 MG/1
50 TABLET, FILM COATED ORAL EVERY 8 HOURS
Qty: 90 TABLET | Refills: 0 | Status: SHIPPED | OUTPATIENT
Start: 2022-10-17 | End: 2023-01-17 | Stop reason: SDUPTHER

## 2022-10-17 RX ORDER — CHOLECALCIFEROL (VITAMIN D3) 25 MCG
2000 TABLET ORAL DAILY
Qty: 60 TABLET | Refills: 5 | Status: SHIPPED | OUTPATIENT
Start: 2022-10-17 | End: 2023-01-17 | Stop reason: SDUPTHER

## 2022-10-17 RX ORDER — CANDESARTAN 32 MG/1
32 TABLET ORAL DAILY
Qty: 90 TABLET | Refills: 3 | Status: SHIPPED | OUTPATIENT
Start: 2022-10-17 | End: 2023-01-17 | Stop reason: SDUPTHER

## 2022-10-17 NOTE — PROGRESS NOTES
[facility] FM Clinic Progress Note    ID:  Dayami Aleman   MRN:  [id number]     10/17/2022    Chief Complaint:  BP follow up, med refills    History of Present Illness:  Dayami Aleman is a 77 y.o. female who presents to [facility] FM clinic for        Int hx: last seen 9/16/22 for BP elevation. She was not taking her clonidine due to drowsiness, so it was discontinued at that time. Her BP logs, highest  to 130, logs not brought to clinic today.  Previous additional workup includes Retroperitoneal US that was not completed due to inability to tolerate pain.    In addition, she is requesting med refills at this time.    Acute Issues: no complaints    Chronic Issues:  HTN- see above  Chronic pain- L spine degenerative pain   Seasonal Allergies- well controlled on montelukast  Iron deficiency- LILY taking OTC supplementation  Vit D deficiency    HM:  Mammo: will discuss next visit  Colon:  DEXA: discussed, osteopenia 2016, defers today  Cervical Cx: unknown last pap, no abnormals  Immunizations: flu shot due        Current Outpatient Medications:     amLODIPine (NORVASC) 10 MG tablet, Take 1 tablet (10 mg total) by mouth once daily., Disp: 90 tablet, Rfl: 3    aspirin (ECOTRIN) 325 MG EC tablet, Take 325 mg by mouth., Disp: , Rfl:     candesartan (ATACAND) 32 MG tablet, Take 1 tablet (32 mg total) by mouth once daily., Disp: 90 tablet, Rfl: 3    cimetidine (TAGAMET) 200 MG tablet, Take 200 mg by mouth 4 (four) times daily., Disp: , Rfl:     ferrous sulfate (FEOSOL) 325 mg (65 mg iron) Tab tablet, Take 325 mg by mouth daily with breakfast., Disp: , Rfl:     hydrALAZINE (APRESOLINE) 50 MG tablet, Take 1 tablet (50 mg total) by mouth every 8 (eight) hours., Disp: 90 tablet, Rfl: 0    loratadine (CLARITIN) 10 mg tablet, Take 1 tablet (10 mg total) by mouth once daily., Disp: 30 tablet, Rfl: 2    promethazine (PHENERGAN) 25 MG tablet, Take 1 tablet (25 mg total) by mouth every 6 (six) hours as needed (nausea and vomiting).,  "Disp: 28 tablet, Rfl: 2    spironolactone (ALDACTONE) 25 MG tablet, Take 1 tablet (25 mg total) by mouth once daily., Disp: 90 tablet, Rfl: 3    sucralfate (CARAFATE) 1 gram tablet, Take 1 tablet (1 g total) by mouth 4 (four) times daily before meals and nightly., Disp: 120 tablet, Rfl: 2    traMADoL (ULTRAM) 50 mg tablet, Take 1 tablet (50 mg total) by mouth 3 (three) times daily as needed for Pain., Disp: 90 tablet, Rfl: 2    vitamin D (VITAMIN D3) 1000 units Tab, Take 2,000 Units by mouth once daily., Disp: , Rfl:     Review of patient's allergies indicates:   Allergen Reactions    Sulfamethoxazole-trimethoprim Hives         Review of Systems:  See HPI      Physical Exam:  /72 (BP Location: Left arm, Patient Position: Sitting, BP Method: Medium (Automatic))   Pulse 88   Temp 98.8 °F (37.1 °C) (Oral)   Resp 18   Ht 5' 2" (1.575 m)   Wt 116.7 kg (257 lb 4.4 oz)   SpO2 98%   BMI 47.06 kg/m²     Gen- well appearing  CV- RRR, 3/6 systolic murmur. 2+ pulses BL DP and radial. No BARBRA  Resp-  Nonlabored breathing  MSK- paraspinal muscle tenderness, no obvious deformity   Skin- no rashes or skin breakdown  Neuro- Alert, responding to questions and commands        Assessment/Plan:    1. Primary hypertension  - Controlled for age  - Continue current med regimen  - Bring BP logs to clinic  - Can up titrate hydralazine pending BP logs  - refills and labs as below  - Discussed repeat renal imaging as retroperitoneal US inadequate in 2018 as pt could not tolerate exam, defers at this     - amLODIPine (NORVASC) 10 MG tablet; Take 1 tablet (10 mg total) by mouth once daily.  Dispense: 90 tablet; Refill: 3  - candesartan (ATACAND) 32 MG tablet; Take 1 tablet (32 mg total) by mouth once daily.  Dispense: 90 tablet; Refill: 3  - hydrALAZINE (APRESOLINE) 50 MG tablet; Take 1 tablet (50 mg total) by mouth every 8 (eight) hours.  Dispense: 90 tablet; Refill: 0  - spironolactone (ALDACTONE) 25 MG tablet; Take 1 tablet (25 mg " total) by mouth once daily.  Dispense: 90 tablet; Refill: 3  - Comprehensive Metabolic Panel  - Renin  - Aldosterone    2. Obesity, unspecified classification, unspecified obesity type, unspecified whether serious comorbidity present  - Discussed healthy diet and weight loss to improve all medical conditions  - Lipid Panel; Future  - Lipid Panel  - CBC Auto Differential  - TSH    3. Vitamin D deficiency  - On vitamin D supplementation 2000 IU daily  - vitamin D (VITAMIN D3) 1000 units Tab; Take 2 tablets (2,000 Units total) by mouth once daily.  Dispense: 60 tablet; Refill: 5  - Vitamin D level ordered    4. Prediabetes  - Last a1c 6.4, no meds  - repeat today  - Microalbumin/Creatinine Ratio, Urine    5. Immunization due  - immunizations as below  - Influenza (FLUAD) - Quadrivalent (Adjuvanted) *Preferred* (65+) (PF)    6. Gastroesophageal reflux disease, unspecified whether esophagitis present  - well controlled  - needs refills  - sucralfate (CARAFATE) 1 gram tablet; Take 1 tablet (1 g total) by mouth 4 (four) times daily before meals and nightly.  Dispense: 120 tablet; Refill: 2  - sucralfate (CARAFATE) 1 gram tablet; Take 1 tablet (1 g total) by mouth 4 (four) times daily. for 7 days  Dispense: 28 tablet; Refill: 0    7. Degeneration of intervertebral disc of lumbar region  - Counseled on conservative therapy  - Stressed she should be taking Acetaminophen 1000 TID and PRN tramadol  - traMADoL (ULTRAM) 50 mg tablet; Take 1 tablet (50 mg total) by mouth 3 (three) times daily as needed for Pain.  Dispense: 90 tablet; Refill: 2    8. Seasonal allergic rhinitis due to pollen  - Requests refills on claritin  - loratadine (CLARITIN) 10 mg tablet; Take 1 tablet (10 mg total) by mouth once daily.  Dispense: 30 tablet; Refill: 2    HM:   Extensive discussion was held with patient regarding screening. She is willing to discuss on follow up visit.    Mason Jackson MD  [employer]  Resident HO2

## 2022-10-19 LAB — ALDOST SERPL-MCNC: 11 NG/DL

## 2022-10-20 ENCOUNTER — TELEPHONE (OUTPATIENT)
Dept: FAMILY MEDICINE | Facility: CLINIC | Age: 77
End: 2022-10-20
Payer: COMMERCIAL

## 2022-10-20 DIAGNOSIS — R79.9 ABNORMAL BLOOD CHEMISTRY: Primary | ICD-10-CM

## 2022-10-20 NOTE — TELEPHONE ENCOUNTER
Spoke with patient provider number [phone], verifying name and date of birth.  Discussed abnormal labs which included macrocytic anemia, patient ran out of iron will start taking again.  Also had elevated BUN/creatinine from the baseline, instructed patient to hydrate well, will come in for repeat BMP next week.  Patient had no questions or concerns regarding her health care.

## 2022-10-21 LAB — RENIN PLAS-CCNC: 8.7 NG/ML/H

## 2022-11-15 ENCOUNTER — TELEPHONE (OUTPATIENT)
Dept: FAMILY MEDICINE | Facility: CLINIC | Age: 77
End: 2022-11-15
Payer: COMMERCIAL

## 2022-11-15 DIAGNOSIS — M51.36 DEGENERATION OF INTERVERTEBRAL DISC OF LUMBAR REGION: Primary | ICD-10-CM

## 2022-11-15 RX ORDER — TRAMADOL HYDROCHLORIDE 50 MG/1
50 TABLET ORAL 3 TIMES DAILY PRN
Qty: 90 TABLET | Refills: 0 | Status: SHIPPED | OUTPATIENT
Start: 2022-11-15 | End: 2022-12-15

## 2022-11-15 NOTE — TELEPHONE ENCOUNTER
Patient left prescription in daughter car,which she lives in [place] and daughter accidentally threw it away, it was a written  prescription. Would you write another script.thank you.

## 2022-11-15 NOTE — TELEPHONE ENCOUNTER
Pt lost physical prescription for tramadol. Will write for 1 month. Pt will need to get further rx from PCP in future. Reviewed PDMP, no red flags.     -K[facility]

## 2022-12-19 DIAGNOSIS — M51.36 DEGENERATION OF INTERVERTEBRAL DISC OF LUMBAR REGION: Primary | ICD-10-CM

## 2022-12-20 RX ORDER — TRAMADOL HYDROCHLORIDE 50 MG/1
50 TABLET ORAL 3 TIMES DAILY
Qty: 90 TABLET | Refills: 0 | Status: SHIPPED | OUTPATIENT
Start: 2022-12-20 | End: 2023-01-17 | Stop reason: SDUPTHER

## 2023-01-17 ENCOUNTER — OFFICE VISIT (OUTPATIENT)
Dept: FAMILY MEDICINE | Facility: CLINIC | Age: 78
End: 2023-01-17
Payer: MEDICARE

## 2023-01-17 VITALS
DIASTOLIC BLOOD PRESSURE: 72 MMHG | WEIGHT: 259.19 LBS | RESPIRATION RATE: 18 BRPM | TEMPERATURE: 98 F | HEART RATE: 66 BPM | SYSTOLIC BLOOD PRESSURE: 138 MMHG | HEIGHT: 62 IN | OXYGEN SATURATION: 99 % | BODY MASS INDEX: 47.7 KG/M2

## 2023-01-17 DIAGNOSIS — M51.36 DEGENERATION OF INTERVERTEBRAL DISC OF LUMBAR REGION: ICD-10-CM

## 2023-01-17 DIAGNOSIS — E55.9 VITAMIN D DEFICIENCY: ICD-10-CM

## 2023-01-17 DIAGNOSIS — J30.1 SEASONAL ALLERGIC RHINITIS DUE TO POLLEN: ICD-10-CM

## 2023-01-17 DIAGNOSIS — G47.33 OBSTRUCTIVE SLEEP APNEA SYNDROME: ICD-10-CM

## 2023-01-17 DIAGNOSIS — I10 PRIMARY HYPERTENSION: Primary | ICD-10-CM

## 2023-01-17 DIAGNOSIS — M19.90 OSTEOARTHRITIS, UNSPECIFIED OSTEOARTHRITIS TYPE, UNSPECIFIED SITE: ICD-10-CM

## 2023-01-17 DIAGNOSIS — K21.9 GASTROESOPHAGEAL REFLUX DISEASE, UNSPECIFIED WHETHER ESOPHAGITIS PRESENT: ICD-10-CM

## 2023-01-17 PROCEDURE — 99214 OFFICE O/P EST MOD 30 MIN: CPT | Mod: PBBFAC

## 2023-01-17 RX ORDER — AMLODIPINE BESYLATE 10 MG/1
10 TABLET ORAL DAILY
Qty: 90 TABLET | Refills: 3 | Status: SHIPPED | OUTPATIENT
Start: 2023-01-17 | End: 2023-01-26 | Stop reason: SDUPTHER

## 2023-01-17 RX ORDER — HYDRALAZINE HYDROCHLORIDE 50 MG/1
50 TABLET, FILM COATED ORAL EVERY 8 HOURS
Qty: 90 TABLET | Refills: 2 | Status: SHIPPED | OUTPATIENT
Start: 2023-01-17 | End: 2023-01-26 | Stop reason: SDUPTHER

## 2023-01-17 RX ORDER — ASPIRIN 325 MG
325 TABLET, DELAYED RELEASE (ENTERIC COATED) ORAL DAILY
Qty: 30 TABLET | Refills: 2 | Status: SHIPPED | OUTPATIENT
Start: 2023-01-17 | End: 2023-01-26 | Stop reason: SDUPTHER

## 2023-01-17 RX ORDER — SPIRONOLACTONE 25 MG/1
25 TABLET ORAL DAILY
Qty: 90 TABLET | Refills: 3 | Status: SHIPPED | OUTPATIENT
Start: 2023-01-17 | End: 2023-01-26 | Stop reason: SDUPTHER

## 2023-01-17 RX ORDER — LORATADINE 10 MG/1
10 TABLET ORAL DAILY
Qty: 30 TABLET | Refills: 2 | Status: SHIPPED | OUTPATIENT
Start: 2023-01-17 | End: 2023-01-26 | Stop reason: SDUPTHER

## 2023-01-17 RX ORDER — SUCRALFATE 1 G/1
1 TABLET ORAL
Qty: 120 TABLET | Refills: 2 | Status: SHIPPED | OUTPATIENT
Start: 2023-01-17 | End: 2023-02-10 | Stop reason: SDUPTHER

## 2023-01-17 RX ORDER — CANDESARTAN 32 MG/1
32 TABLET ORAL DAILY
Qty: 90 TABLET | Refills: 3 | Status: SHIPPED | OUTPATIENT
Start: 2023-01-17 | End: 2023-01-26 | Stop reason: SDUPTHER

## 2023-01-17 RX ORDER — FERROUS SULFATE 325(65) MG
325 TABLET ORAL
Qty: 30 TABLET | Refills: 2 | Status: SHIPPED | OUTPATIENT
Start: 2023-01-17 | End: 2023-01-26 | Stop reason: SDUPTHER

## 2023-01-17 RX ORDER — ZINC GLUCONATE 13.3 MG
200 LOZENGE ORAL 4 TIMES DAILY
Qty: 120 TABLET | Refills: 2 | Status: SHIPPED | OUTPATIENT
Start: 2023-01-17 | End: 2023-01-26 | Stop reason: SDUPTHER

## 2023-01-17 RX ORDER — TRAMADOL HYDROCHLORIDE 50 MG/1
50 TABLET ORAL 3 TIMES DAILY
Qty: 90 TABLET | Refills: 2 | Status: SHIPPED | OUTPATIENT
Start: 2023-01-17 | End: 2023-04-17

## 2023-01-17 RX ORDER — CHOLECALCIFEROL (VITAMIN D3) 25 MCG
2000 TABLET ORAL DAILY
Qty: 60 TABLET | Refills: 5 | Status: SHIPPED | OUTPATIENT
Start: 2023-01-17 | End: 2023-02-10 | Stop reason: SDUPTHER

## 2023-01-17 NOTE — PROGRESS NOTES
"[facility] Note    Subjective:     Patient ID: Dayami Aleman is a 77 y.o. female    Chief Complaint:   Chief Complaint   Patient presents with    Follow-up       HPI  78 yo F presents for routine follow up and medication refill    Acute Concerns:  No acute concerns or complaints today      Chronic Conditions:  Multilevel Lumbar Spine Degenerative Disease: Takes Tramadol 50mg TID with adequate results. Lost last prescription and has been out of Tramadol for one month, reports a lot of pain, and being able to tolerate due to being prescribed Tylenol #3 by dentist for a dental procedure.     MAYANK: Patient reports using CPAP machine every night. Tolerates well.    HTN: BP controlled in office today; patient continues with Candesartan 32mg, Hydralazine 50mg TID, Spironolactone 25mg qD, and Amlodipine 10mg; Patient denies headaches, vision changes, or chest pain. Checks BP at home occasionally with adequate results    GERD: Controlled with Carafate and Cimetidine      Controlled Substance Agreement  Diagnosis: Lumbar Disc Disease (M51.56)  Prescription: Tramadol 50mg TID  CSA Signed: 01/17/2023  UDS: Will need at next visit   reviewed: 01/17/2023  Dose reduction counseling: Yes  Concurrent Benzodiazepine: No  MME: 15  Functional assessment: Patient unable to perform ADLs without pain medication    Psychosocial History (yes/no)  Depression: No  Anxiety: No  Substance Abuse Disorder: No  Alcohol Abuse: No  Mental Illness: No  Opioid Use Disorder: No    Therapies/Interventions Optimized  Nonopioid medications: OTC analgesics  Exercise Therapy: HEP  Physical Therapy: Tried in the past      Review of Systems  As per HPI    Objective:     Blood pressure 138/72, pulse 66, temperature 98.4 °F (36.9 °C), resp. rate 18, height 5' 2" (1.575 m), weight 117.6 kg (259 lb 3.2 oz), SpO2 99 %.    Physical Exam    General: Well developed, no acute distress  CV: Regular rate rhythm, no edema, 2+ peripheral " pulses  Resp: Non-labored breathing, symmetrical chest expansion bilaterally  Abd: soft, non-distended, non-tender to palpation, +BS, no organomegaly  MSK: Tenderness to palpation over lumbar spine and paraspinal musculature. No overlying skin changes. Patient ambulates with cane.       Assessment:     Problem List Items Addressed This Visit          Unprioritized    Degeneration of intervertebral disc of lumbar region    Relevant Medications    traMADoL (ULTRAM) 50 mg tablet    Hypertension - Primary    Relevant Medications    amLODIPine (NORVASC) 10 MG tablet    spironolactone (ALDACTONE) 25 MG tablet    candesartan (ATACAND) 32 MG tablet    hydrALAZINE (APRESOLINE) 50 MG tablet    Obstructive sleep apnea syndrome    Osteoarthritis    Vitamin D deficiency    Relevant Medications    vitamin D (VITAMIN D3) 1000 units Tab    Allergic rhinitis    Relevant Medications    loratadine (CLARITIN) 10 mg tablet     Other Visit Diagnoses       Gastroesophageal reflux disease, unspecified whether esophagitis present        Relevant Medications    sucralfate (CARAFATE) 1 gram tablet            Plan:       Lumbar Disc Disease  Osteoarthritis  - Refilled Tramadol 50mg TID x3 months (01/17 x3 refills)  - CSA signed in office today, Patient will need UDS at next visit  - Advised to add extra strength Tylenol if having breakthrough pain    HTN  - Refilled meds  - Advised patient to keep log of BP at home and bring to next visit    GERD  - Controlled, refilled medications    Healthcare Maintenance  - Patient not sure if she would like to have DEXA scan, continues to think about it  - Last MG 2018, will need to discuss further screening with patient at next visit    RTC in 3 months for reevalaution    Agata Chiu MD  [employer] [facility]  PGY-3

## 2023-01-23 DIAGNOSIS — E55.9 VITAMIN D DEFICIENCY: ICD-10-CM

## 2023-01-23 DIAGNOSIS — J30.1 SEASONAL ALLERGIC RHINITIS DUE TO POLLEN: ICD-10-CM

## 2023-01-23 DIAGNOSIS — K21.9 GASTROESOPHAGEAL REFLUX DISEASE, UNSPECIFIED WHETHER ESOPHAGITIS PRESENT: ICD-10-CM

## 2023-01-23 DIAGNOSIS — I10 PRIMARY HYPERTENSION: ICD-10-CM

## 2023-01-23 RX ORDER — CHOLECALCIFEROL (VITAMIN D3) 25 MCG
2000 TABLET ORAL DAILY
Qty: 60 TABLET | Refills: 5 | Status: CANCELLED | OUTPATIENT
Start: 2023-01-23

## 2023-01-23 RX ORDER — CANDESARTAN 32 MG/1
32 TABLET ORAL DAILY
Qty: 90 TABLET | Refills: 3 | Status: CANCELLED | OUTPATIENT
Start: 2023-01-23

## 2023-01-23 RX ORDER — SUCRALFATE 1 G/1
1 TABLET ORAL
Qty: 120 TABLET | Refills: 2 | Status: CANCELLED | OUTPATIENT
Start: 2023-01-23

## 2023-01-23 RX ORDER — LORATADINE 10 MG/1
10 TABLET ORAL DAILY
Qty: 30 TABLET | Refills: 2 | Status: CANCELLED | OUTPATIENT
Start: 2023-01-23 | End: 2023-04-23

## 2023-01-23 RX ORDER — AMLODIPINE BESYLATE 10 MG/1
10 TABLET ORAL DAILY
Qty: 90 TABLET | Refills: 3 | Status: CANCELLED | OUTPATIENT
Start: 2023-01-23

## 2023-01-23 RX ORDER — ZINC GLUCONATE 13.3 MG
200 LOZENGE ORAL 4 TIMES DAILY
Qty: 120 TABLET | Refills: 2 | Status: CANCELLED | OUTPATIENT
Start: 2023-01-23 | End: 2023-04-23

## 2023-01-23 RX ORDER — SPIRONOLACTONE 25 MG/1
25 TABLET ORAL DAILY
Qty: 90 TABLET | Refills: 3 | Status: CANCELLED | OUTPATIENT
Start: 2023-01-23

## 2023-01-23 RX ORDER — ASPIRIN 325 MG
325 TABLET, DELAYED RELEASE (ENTERIC COATED) ORAL DAILY
Qty: 30 TABLET | Refills: 2 | Status: CANCELLED | OUTPATIENT
Start: 2023-01-23 | End: 2023-04-23

## 2023-01-23 RX ORDER — FERROUS SULFATE 325(65) MG
325 TABLET ORAL
Qty: 30 TABLET | Refills: 2 | Status: CANCELLED | OUTPATIENT
Start: 2023-01-23 | End: 2023-04-23

## 2023-01-23 RX ORDER — HYDRALAZINE HYDROCHLORIDE 50 MG/1
50 TABLET, FILM COATED ORAL EVERY 8 HOURS
Qty: 90 TABLET | Refills: 2 | Status: CANCELLED | OUTPATIENT
Start: 2023-01-23 | End: 2023-04-23

## 2023-01-23 NOTE — PROGRESS NOTES
Faculty addendum: Patient discussed with resident. Chart was reviewed including vitals, labs, etc. Care provided reasonable and necessary. I participated in the management of the patient and was immediately available throughout the encounter. Services were furnished in a primary care center located in the outpatient department of a [facility] hospital. I agree with the resident's findings and plan as documented in the resident's note.

## 2023-01-26 DIAGNOSIS — I10 PRIMARY HYPERTENSION: ICD-10-CM

## 2023-01-26 DIAGNOSIS — J30.1 SEASONAL ALLERGIC RHINITIS DUE TO POLLEN: ICD-10-CM

## 2023-01-26 RX ORDER — SPIRONOLACTONE 25 MG/1
25 TABLET ORAL DAILY
Qty: 30 TABLET | Refills: 0 | Status: SHIPPED | OUTPATIENT
Start: 2023-01-26 | End: 2023-02-10 | Stop reason: SDUPTHER

## 2023-01-26 RX ORDER — CANDESARTAN 32 MG/1
32 TABLET ORAL DAILY
Qty: 30 TABLET | Refills: 0 | Status: SHIPPED | OUTPATIENT
Start: 2023-01-26 | End: 2023-02-10 | Stop reason: SDUPTHER

## 2023-01-26 RX ORDER — LORATADINE 10 MG/1
10 TABLET ORAL DAILY
Qty: 30 TABLET | Refills: 2 | Status: SHIPPED | OUTPATIENT
Start: 2023-01-26 | End: 2023-02-10 | Stop reason: SDUPTHER

## 2023-01-26 RX ORDER — FERROUS SULFATE 325(65) MG
325 TABLET ORAL
Qty: 30 TABLET | Refills: 2 | Status: SHIPPED | OUTPATIENT
Start: 2023-01-26 | End: 2023-02-10 | Stop reason: SDUPTHER

## 2023-01-26 RX ORDER — HYDRALAZINE HYDROCHLORIDE 50 MG/1
50 TABLET, FILM COATED ORAL EVERY 8 HOURS
Qty: 90 TABLET | Refills: 0 | Status: SHIPPED | OUTPATIENT
Start: 2023-01-26 | End: 2023-02-10 | Stop reason: SDUPTHER

## 2023-01-26 RX ORDER — ASPIRIN 325 MG
325 TABLET, DELAYED RELEASE (ENTERIC COATED) ORAL DAILY
Qty: 30 TABLET | Refills: 0 | Status: SHIPPED | OUTPATIENT
Start: 2023-01-26 | End: 2023-04-26

## 2023-01-26 RX ORDER — AMLODIPINE BESYLATE 10 MG/1
10 TABLET ORAL DAILY
Qty: 30 TABLET | Refills: 0 | Status: SHIPPED | OUTPATIENT
Start: 2023-01-26 | End: 2023-02-10 | Stop reason: SDUPTHER

## 2023-01-26 RX ORDER — ZINC GLUCONATE 13.3 MG
200 LOZENGE ORAL 4 TIMES DAILY
Qty: 120 TABLET | Refills: 0 | Status: SHIPPED | OUTPATIENT
Start: 2023-01-26 | End: 2023-02-10 | Stop reason: SDUPTHER

## 2023-02-08 ENCOUNTER — TELEPHONE (OUTPATIENT)
Dept: FAMILY MEDICINE | Facility: CLINIC | Age: 78
End: 2023-02-08
Payer: COMMERCIAL

## 2023-02-10 DIAGNOSIS — E55.9 VITAMIN D DEFICIENCY: ICD-10-CM

## 2023-02-10 DIAGNOSIS — I10 PRIMARY HYPERTENSION: ICD-10-CM

## 2023-02-10 DIAGNOSIS — J30.1 SEASONAL ALLERGIC RHINITIS DUE TO POLLEN: ICD-10-CM

## 2023-02-10 DIAGNOSIS — K21.9 GASTROESOPHAGEAL REFLUX DISEASE, UNSPECIFIED WHETHER ESOPHAGITIS PRESENT: ICD-10-CM

## 2023-02-10 RX ORDER — SUCRALFATE 1 G/1
1 TABLET ORAL
Qty: 120 TABLET | Refills: 2 | Status: ON HOLD | OUTPATIENT
Start: 2023-02-10 | End: 2023-03-04 | Stop reason: HOSPADM

## 2023-02-10 RX ORDER — ZINC GLUCONATE 13.3 MG
200 LOZENGE ORAL 4 TIMES DAILY
Qty: 120 TABLET | Refills: 0 | Status: SHIPPED | OUTPATIENT
Start: 2023-02-10 | End: 2023-03-15 | Stop reason: SDUPTHER

## 2023-02-10 RX ORDER — CANDESARTAN 32 MG/1
32 TABLET ORAL DAILY
Qty: 30 TABLET | Refills: 0 | Status: SHIPPED | OUTPATIENT
Start: 2023-02-10 | End: 2023-03-15 | Stop reason: SDUPTHER

## 2023-02-10 RX ORDER — AMLODIPINE BESYLATE 10 MG/1
10 TABLET ORAL DAILY
Qty: 30 TABLET | Refills: 0 | Status: SHIPPED | OUTPATIENT
Start: 2023-02-10 | End: 2023-03-15 | Stop reason: SDUPTHER

## 2023-02-10 RX ORDER — SPIRONOLACTONE 25 MG/1
25 TABLET ORAL DAILY
Qty: 30 TABLET | Refills: 0 | Status: ON HOLD | OUTPATIENT
Start: 2023-02-10 | End: 2023-03-04 | Stop reason: HOSPADM

## 2023-02-10 RX ORDER — HYDRALAZINE HYDROCHLORIDE 50 MG/1
50 TABLET, FILM COATED ORAL EVERY 8 HOURS
Qty: 90 TABLET | Refills: 0 | Status: ON HOLD | OUTPATIENT
Start: 2023-02-10 | End: 2023-03-04 | Stop reason: HOSPADM

## 2023-02-10 RX ORDER — FERROUS SULFATE 325(65) MG
325 TABLET ORAL
Qty: 30 TABLET | Refills: 2 | Status: SHIPPED | OUTPATIENT
Start: 2023-02-10 | End: 2023-05-11

## 2023-02-10 RX ORDER — CHOLECALCIFEROL (VITAMIN D3) 25 MCG
2000 TABLET ORAL DAILY
Qty: 60 TABLET | Refills: 5 | Status: SHIPPED | OUTPATIENT
Start: 2023-02-10 | End: 2023-05-22 | Stop reason: SDUPTHER

## 2023-02-10 RX ORDER — LORATADINE 10 MG/1
10 TABLET ORAL DAILY
Qty: 30 TABLET | Refills: 2 | Status: ON HOLD | OUTPATIENT
Start: 2023-02-10 | End: 2023-03-04 | Stop reason: HOSPADM

## 2023-02-28 ENCOUNTER — HOSPITAL ENCOUNTER (INPATIENT)
Facility: HOSPITAL | Age: 78
LOS: 3 days | Discharge: HOME HEALTH | DRG: 305 | End: 2023-03-04
Attending: EMERGENCY MEDICINE | Admitting: INTERNAL MEDICINE
Payer: COMMERCIAL

## 2023-02-28 ENCOUNTER — OFFICE VISIT (OUTPATIENT)
Dept: URGENT CARE | Facility: CLINIC | Age: 78
End: 2023-02-28
Payer: COMMERCIAL

## 2023-02-28 VITALS
RESPIRATION RATE: 18 BRPM | HEART RATE: 73 BPM | DIASTOLIC BLOOD PRESSURE: 74 MMHG | WEIGHT: 250 LBS | OXYGEN SATURATION: 96 % | HEIGHT: 62 IN | TEMPERATURE: 99 F | SYSTOLIC BLOOD PRESSURE: 192 MMHG | BODY MASS INDEX: 46.01 KG/M2

## 2023-02-28 DIAGNOSIS — R07.9 CHEST PAIN, UNSPECIFIED TYPE: ICD-10-CM

## 2023-02-28 DIAGNOSIS — B96.89 BACTERIAL CONJUNCTIVITIS OF BOTH EYES: Primary | ICD-10-CM

## 2023-02-28 DIAGNOSIS — I16.0 HYPERTENSIVE URGENCY: Primary | ICD-10-CM

## 2023-02-28 DIAGNOSIS — I10 HTN (HYPERTENSION), MALIGNANT: ICD-10-CM

## 2023-02-28 DIAGNOSIS — R03.0 ELEVATED BLOOD PRESSURE READING: ICD-10-CM

## 2023-02-28 DIAGNOSIS — R06.02 SOB (SHORTNESS OF BREATH): ICD-10-CM

## 2023-02-28 DIAGNOSIS — R07.9 CHEST PAIN IN ADULT: ICD-10-CM

## 2023-02-28 DIAGNOSIS — H10.9 BACTERIAL CONJUNCTIVITIS OF BOTH EYES: Primary | ICD-10-CM

## 2023-02-28 DIAGNOSIS — R07.9 CHEST PAIN: ICD-10-CM

## 2023-02-28 LAB
ALBUMIN SERPL-MCNC: 3.5 G/DL (ref 3.4–4.8)
ALBUMIN/GLOB SERPL: 1 RATIO (ref 1.1–2)
ALP SERPL-CCNC: 100 UNIT/L (ref 40–150)
ALT SERPL-CCNC: 33 UNIT/L (ref 0–55)
AST SERPL-CCNC: 20 UNIT/L (ref 5–34)
BASOPHILS # BLD AUTO: 0.05 X10(3)/MCL (ref 0–0.2)
BASOPHILS NFR BLD AUTO: 0.8 %
BILIRUBIN DIRECT+TOT PNL SERPL-MCNC: 0.2 MG/DL
BNP BLD-MCNC: 46 PG/ML
BUN SERPL-MCNC: 10.7 MG/DL (ref 9.8–20.1)
CALCIUM SERPL-MCNC: 9.5 MG/DL (ref 8.4–10.2)
CHLORIDE SERPL-SCNC: 104 MMOL/L (ref 98–107)
CO2 SERPL-SCNC: 28 MMOL/L (ref 23–31)
CREAT SERPL-MCNC: 0.97 MG/DL (ref 0.55–1.02)
EOSINOPHIL # BLD AUTO: 0.12 X10(3)/MCL (ref 0–0.9)
EOSINOPHIL NFR BLD AUTO: 2 %
ERYTHROCYTE [DISTWIDTH] IN BLOOD BY AUTOMATED COUNT: 18.2 % (ref 11.5–17)
GFR SERPLBLD CREATININE-BSD FMLA CKD-EPI: 60 MLS/MIN/1.73/M2
GLOBULIN SER-MCNC: 3.6 GM/DL (ref 2.4–3.5)
GLUCOSE SERPL-MCNC: 123 MG/DL (ref 82–115)
HCT VFR BLD AUTO: 37.3 % (ref 37–47)
HGB BLD-MCNC: 11.7 G/DL (ref 12–16)
IMM GRANULOCYTES # BLD AUTO: 0.01 X10(3)/MCL (ref 0–0.04)
IMM GRANULOCYTES NFR BLD AUTO: 0.2 %
LYMPHOCYTES # BLD AUTO: 2.22 X10(3)/MCL (ref 0.6–4.6)
LYMPHOCYTES NFR BLD AUTO: 36.9 %
MCH RBC QN AUTO: 24.2 PG
MCHC RBC AUTO-ENTMCNC: 31.4 G/DL (ref 33–36)
MCV RBC AUTO: 77.1 FL (ref 80–94)
MONOCYTES # BLD AUTO: 0.73 X10(3)/MCL (ref 0.1–1.3)
MONOCYTES NFR BLD AUTO: 12.1 %
NEUTROPHILS # BLD AUTO: 2.88 X10(3)/MCL (ref 2.1–9.2)
NEUTROPHILS NFR BLD AUTO: 48 %
NRBC BLD AUTO-RTO: 0 %
PLATELET # BLD AUTO: 213 X10(3)/MCL (ref 130–400)
PMV BLD AUTO: 10.5 FL (ref 7.4–10.4)
POTASSIUM SERPL-SCNC: 4 MMOL/L (ref 3.5–5.1)
PROT SERPL-MCNC: 7.1 GM/DL (ref 5.8–7.6)
RBC # BLD AUTO: 4.84 X10(6)/MCL (ref 4.2–5.4)
SODIUM SERPL-SCNC: 140 MMOL/L (ref 136–145)
TROPONIN I SERPL-MCNC: 0.01 NG/ML (ref 0–0.04)
WBC # SPEC AUTO: 6 X10(3)/MCL (ref 4.5–11.5)

## 2023-02-28 PROCEDURE — 83880 ASSAY OF NATRIURETIC PEPTIDE: CPT | Performed by: NURSE PRACTITIONER

## 2023-02-28 PROCEDURE — 3078F DIAST BP <80 MM HG: CPT | Mod: CPTII,,, | Performed by: FAMILY MEDICINE

## 2023-02-28 PROCEDURE — 3077F SYST BP >= 140 MM HG: CPT | Mod: CPTII,,, | Performed by: FAMILY MEDICINE

## 2023-02-28 PROCEDURE — 3078F PR MOST RECENT DIASTOLIC BLOOD PRESSURE < 80 MM HG: ICD-10-PCS | Mod: CPTII,,, | Performed by: FAMILY MEDICINE

## 2023-02-28 PROCEDURE — 85025 COMPLETE CBC W/AUTO DIFF WBC: CPT | Performed by: NURSE PRACTITIONER

## 2023-02-28 PROCEDURE — 1159F MED LIST DOCD IN RCRD: CPT | Mod: CPTII,,, | Performed by: FAMILY MEDICINE

## 2023-02-28 PROCEDURE — 99285 EMERGENCY DEPT VISIT HI MDM: CPT | Mod: 25

## 2023-02-28 PROCEDURE — 93010 ELECTROCARDIOGRAM REPORT: CPT | Mod: ,,, | Performed by: INTERNAL MEDICINE

## 2023-02-28 PROCEDURE — 1160F PR REVIEW ALL MEDS BY PRESCRIBER/CLIN PHARMACIST DOCUMENTED: ICD-10-PCS | Mod: CPTII,,, | Performed by: FAMILY MEDICINE

## 2023-02-28 PROCEDURE — 99213 PR OFFICE/OUTPT VISIT, EST, LEVL III, 20-29 MIN: ICD-10-PCS | Mod: ,,, | Performed by: FAMILY MEDICINE

## 2023-02-28 PROCEDURE — 93010 EKG 12-LEAD: ICD-10-PCS | Mod: ,,, | Performed by: INTERNAL MEDICINE

## 2023-02-28 PROCEDURE — 99213 OFFICE O/P EST LOW 20 MIN: CPT | Mod: ,,, | Performed by: FAMILY MEDICINE

## 2023-02-28 PROCEDURE — 80053 COMPREHEN METABOLIC PANEL: CPT | Performed by: NURSE PRACTITIONER

## 2023-02-28 PROCEDURE — 84484 ASSAY OF TROPONIN QUANT: CPT | Performed by: NURSE PRACTITIONER

## 2023-02-28 PROCEDURE — 1159F PR MEDICATION LIST DOCUMENTED IN MEDICAL RECORD: ICD-10-PCS | Mod: CPTII,,, | Performed by: FAMILY MEDICINE

## 2023-02-28 PROCEDURE — 3077F PR MOST RECENT SYSTOLIC BLOOD PRESSURE >= 140 MM HG: ICD-10-PCS | Mod: CPTII,,, | Performed by: FAMILY MEDICINE

## 2023-02-28 PROCEDURE — 25000003 PHARM REV CODE 250: Performed by: NURSE PRACTITIONER

## 2023-02-28 PROCEDURE — 1160F RVW MEDS BY RX/DR IN RCRD: CPT | Mod: CPTII,,, | Performed by: FAMILY MEDICINE

## 2023-02-28 PROCEDURE — 93005 ELECTROCARDIOGRAM TRACING: CPT

## 2023-02-28 RX ORDER — ERYTHROMYCIN 5 MG/G
OINTMENT OPHTHALMIC 3 TIMES DAILY
Qty: 3.5 G | Refills: 0 | Status: ON HOLD | OUTPATIENT
Start: 2023-02-28 | End: 2023-03-04 | Stop reason: HOSPADM

## 2023-02-28 RX ORDER — ERYTHROMYCIN 5 MG/G
OINTMENT OPHTHALMIC 3 TIMES DAILY
Qty: 3.5 G | Refills: 0 | Status: SHIPPED | OUTPATIENT
Start: 2023-02-28 | End: 2023-02-28

## 2023-02-28 RX ORDER — CLONIDINE HYDROCHLORIDE 0.2 MG/1
0.2 TABLET ORAL
Status: COMPLETED | OUTPATIENT
Start: 2023-02-28 | End: 2023-02-28

## 2023-02-28 RX ADMIN — CLONIDINE HYDROCHLORIDE 0.2 MG: 0.2 TABLET ORAL at 10:02

## 2023-02-28 NOTE — PROGRESS NOTES
Patient ID: [id number]     Chief Complaint: upper respiratory tract infection symptoms    History of Present Illness:     Dayami Aleman is a 77 y.o. female  who presents today for symptoms of Eye Problem (Bilateral eye redness, burning, itch, and swelling x yesterday morning. ) and Hypertension (Elevated BP noticed after triage. Patient states she did not take her BP medication today. Amlodipine 10 mg )    77-year-old female with a history of hypertension, obesity, MAYANK, allergic rhinitis, chronic back pain, presents with a 1 day history of bilateral eye redness, burning, and swelling.  She denies any vision changes and any pain with eye movement.    Her blood pressure was high today, and she reports that she did not take her morning hypertension medications because her son picked her up before she could take them.  She does not have a high blood pressure cuff at home.    Pt denies experiencing any fevers, chills, nausea, vomiting, difficulty breathing, dysphagia, or neck stiffness.    Past Medical History:     ----------------------------  GERD (gastroesophageal reflux disease)  Hypertension  Other seasonal allergic rhinitis  Unspecified osteoarthritis, unspecified site  Vitamin D deficiency     Past Surgical History:   Procedure Laterality Date    NO PAST SURGERIES         Review of patient's allergies indicates:   Allergen Reactions    Sulfamethoxazole-trimethoprim Hives       Outpatient Medications Marked as Taking for the 2/28/23 encounter (Office Visit) with Arvind Ramirez MD   Medication Sig Dispense Refill    amLODIPine (NORVASC) 10 MG tablet Take 1 tablet (10 mg total) by mouth once daily. 30 tablet 0    aspirin (ECOTRIN) 325 MG EC tablet Take 1 tablet (325 mg total) by mouth once daily. 30 tablet 0    candesartan (ATACAND) 32 MG tablet Take 1 tablet (32 mg total) by mouth once daily. 30 tablet 0    cimetidine (TAGAMET) 200 MG tablet Take 1 tablet (200 mg total) by mouth 4 (four) times daily. 120  "tablet 0    ferrous sulfate (FEOSOL) 325 mg (65 mg iron) Tab tablet Take 1 tablet (325 mg total) by mouth daily with breakfast. 30 tablet 2    hydrALAZINE (APRESOLINE) 50 MG tablet Take 1 tablet (50 mg total) by mouth every 8 (eight) hours. 90 tablet 0    loratadine (CLARITIN) 10 mg tablet Take 1 tablet (10 mg total) by mouth once daily. 30 tablet 2    spironolactone (ALDACTONE) 25 MG tablet Take 1 tablet (25 mg total) by mouth once daily. 30 tablet 0    sucralfate (CARAFATE) 1 gram tablet Take 1 tablet (1 g total) by mouth 4 (four) times daily before meals and nightly. 120 tablet 2    traMADoL (ULTRAM) 50 mg tablet Take 1 tablet (50 mg total) by mouth 3 (three) times daily. 90 tablet 2    vitamin D (VITAMIN D3) 1000 units Tab Take 2 tablets (2,000 Units total) by mouth once daily. 60 tablet 5       Social History     Socioeconomic History    Marital status:    Tobacco Use    Smoking status: Never    Smokeless tobacco: Never   Substance and Sexual Activity    Alcohol use: Not Currently    Drug use: Never    Sexual activity: Not Currently        Family History   Problem Relation Age of Onset    Hypertension Mother     Stroke Mother     Hypertension Father     Stroke Father     Stroke Brother         Subjective:     Review of Systems   Constitutional:  Negative for chills, fever and malaise/fatigue.   HENT:  Negative for congestion, ear discharge, ear pain, sinus pain and sore throat.    Eyes:  Positive for pain, discharge and redness.   Respiratory:  Negative for cough, sputum production, shortness of breath, wheezing and stridor.    Gastrointestinal:  Negative for abdominal pain, diarrhea, nausea and vomiting.   Genitourinary:  Negative for dysuria, frequency and urgency.   Musculoskeletal:  Negative for neck pain.   Skin:  Negative for rash.   Neurological:  Negative for headaches.     Objective:     BP (!) 192/74 (BP Location: Left arm)   Pulse 73   Temp 98.5 °F (36.9 °C)   Resp 18   Ht 5' 2" (1.575 m)  "  Wt 113.4 kg (250 lb)   SpO2 96%   BMI 45.73 kg/m²     Physical Exam  Constitutional:       General: She is not in acute distress.     Appearance: Normal appearance. She is not ill-appearing or toxic-appearing.   HENT:      Head: Normocephalic and atraumatic.      Nose: No congestion or rhinorrhea.   Eyes:      General:         Right eye: No discharge.         Left eye: No discharge.      Extraocular Movements: Extraocular movements intact.      Conjunctiva/sclera: Conjunctivae normal.      Pupils: Pupils are equal, round, and reactive to light.      Comments: Bilateral eyes:  No afferent pupillary defect, no ciliary flush, no corneal opacity lesion, no exophthalmos, no ophthalmoplegia, no pain with eye movements, no reduced ocular light reflex.   There is bilateral conjunctival erythema.  There is a purulent, white discharge at the lateral corner of the left eye.  There is no appreciable periorbital swelling on either side.  No skin changes periorbitally either.   Musculoskeletal:      Cervical back: No rigidity or tenderness.   Lymphadenopathy:      Cervical: No cervical adenopathy.   Neurological:      Mental Status: She is alert and oriented to person, place, and time. Mental status is at baseline.   Psychiatric:         Mood and Affect: Mood normal.         Behavior: Behavior normal.       Assessment & Plan:       ICD-10-CM ICD-9-CM   1. Bacterial conjunctivitis of both eyes  H10.9 372.30    B96.89    2. Elevated blood pressure reading  R03.0 796.2        1. Bacterial conjunctivitis of both eyes  -     erythromycin (ROMYCIN) ophthalmic ointment; Place into the right eye 3 (three) times daily. for 7 days  Dispense: 3.5 g; Refill: 0  -     naphazoline-pheniramine 0.025-0.3% (NAPHCON-A) 0.025-0.3 % ophthalmic solution; Place 1 drop into the right eye 4 (four) times daily. for 4 days  Dispense: 5 mL; Refill: 0    2. Elevated blood pressure reading       We will treat for presumptive bacterial conjunctivitis.   Discussed to report to the emergency room if she develops pain with eye movement, swelling and redness around the eye, or changes to her vision.     We had a long discussion about her high blood pressure readings today.  Given her lack of hypertensive crisis symptoms, and the fact that she takes normally multiple high blood pressure medications but has not taken them yet today, I am comfortable with sending her home.  She will take the blood pressure medication when she gets home, and she will have her daughter bring over blood pressure cuff this afternoon to make sure it is going down.  If it does not, she knows to report to the emergency room

## 2023-02-28 NOTE — PATIENT INSTRUCTIONS
Please use eyedrops and eye ointment as directed for at least 5 days.    Please take your blood pressure medications when you get home.    Please have her daughter bring the blood pressure cuff bacteria how so that you can check it this afternoon.    If your blood pressure does not go below 160/90, please report to the emergency room.    Please also reports to the emergency room if you develop pain with eye movement, swelling and redness around the eye, or changes to your vision, severe headaches, nausea and vomiting, or any other concerning signs or symptoms.

## 2023-03-01 LAB
ANION GAP SERPL CALC-SCNC: 10 MEQ/L
AV MEAN GRADIENT: 17 MMHG
AV PEAK GRADIENT: 31 MMHG
BSA FOR ECHO PROCEDURE: 2.26 M2
BUN SERPL-MCNC: 10.4 MG/DL (ref 9.8–20.1)
CALCIUM SERPL-MCNC: 9.6 MG/DL (ref 8.4–10.2)
CHLORIDE SERPL-SCNC: 102 MMOL/L (ref 98–107)
CO2 SERPL-SCNC: 28 MMOL/L (ref 23–31)
CREAT SERPL-MCNC: 0.93 MG/DL (ref 0.55–1.02)
CREAT/UREA NIT SERPL: 11
DOP CALC AO PEAK VEL: 2.8 M/S
EJECTION FRACTION: 50 %
GFR SERPLBLD CREATININE-BSD FMLA CKD-EPI: >60 MLS/MIN/1.73/M2
GLUCOSE SERPL-MCNC: 111 MG/DL (ref 82–115)
POTASSIUM SERPL-SCNC: 3.9 MMOL/L (ref 3.5–5.1)
SODIUM SERPL-SCNC: 140 MMOL/L (ref 136–145)
TROPONIN I SERPL-MCNC: 0.02 NG/ML (ref 0–0.04)
TROPONIN I SERPL-MCNC: 0.03 NG/ML (ref 0–0.04)
TROPONIN I SERPL-MCNC: <0.01 NG/ML (ref 0–0.04)
TROPONIN I SERPL-MCNC: <0.01 NG/ML (ref 0–0.04)

## 2023-03-01 PROCEDURE — 25000242 PHARM REV CODE 250 ALT 637 W/ HCPCS: Performed by: EMERGENCY MEDICINE

## 2023-03-01 PROCEDURE — 84484 ASSAY OF TROPONIN QUANT: CPT | Performed by: EMERGENCY MEDICINE

## 2023-03-01 PROCEDURE — 63600175 PHARM REV CODE 636 W HCPCS: Performed by: PHYSICIAN ASSISTANT

## 2023-03-01 PROCEDURE — 63600175 PHARM REV CODE 636 W HCPCS: Performed by: NURSE PRACTITIONER

## 2023-03-01 PROCEDURE — 25000003 PHARM REV CODE 250: Performed by: INTERNAL MEDICINE

## 2023-03-01 PROCEDURE — 25000003 PHARM REV CODE 250: Performed by: PHYSICIAN ASSISTANT

## 2023-03-01 PROCEDURE — 96374 THER/PROPH/DIAG INJ IV PUSH: CPT

## 2023-03-01 PROCEDURE — 84484 ASSAY OF TROPONIN QUANT: CPT | Performed by: PHYSICIAN ASSISTANT

## 2023-03-01 PROCEDURE — 80048 BASIC METABOLIC PNL TOTAL CA: CPT | Performed by: PHYSICIAN ASSISTANT

## 2023-03-01 PROCEDURE — 25000003 PHARM REV CODE 250: Performed by: EMERGENCY MEDICINE

## 2023-03-01 PROCEDURE — 21400001 HC TELEMETRY ROOM

## 2023-03-01 PROCEDURE — 63600175 PHARM REV CODE 636 W HCPCS: Performed by: EMERGENCY MEDICINE

## 2023-03-01 RX ORDER — IBUPROFEN 200 MG
16 TABLET ORAL
Status: DISCONTINUED | OUTPATIENT
Start: 2023-03-01 | End: 2023-03-04 | Stop reason: HOSPADM

## 2023-03-01 RX ORDER — CHOLECALCIFEROL (VITAMIN D3) 25 MCG
2000 TABLET ORAL DAILY
Status: DISCONTINUED | OUTPATIENT
Start: 2023-03-01 | End: 2023-03-04 | Stop reason: HOSPADM

## 2023-03-01 RX ORDER — VALSARTAN 80 MG/1
320 TABLET ORAL DAILY
Status: DISCONTINUED | OUTPATIENT
Start: 2023-03-01 | End: 2023-03-01

## 2023-03-01 RX ORDER — LISINOPRIL 10 MG/1
40 TABLET ORAL DAILY
Status: DISCONTINUED | OUTPATIENT
Start: 2023-03-01 | End: 2023-03-04 | Stop reason: HOSPADM

## 2023-03-01 RX ORDER — HYDRALAZINE HYDROCHLORIDE 50 MG/1
100 TABLET, FILM COATED ORAL EVERY 8 HOURS
Status: DISCONTINUED | OUTPATIENT
Start: 2023-03-01 | End: 2023-03-04 | Stop reason: HOSPADM

## 2023-03-01 RX ORDER — ONDANSETRON 2 MG/ML
4 INJECTION INTRAMUSCULAR; INTRAVENOUS EVERY 4 HOURS PRN
Status: DISCONTINUED | OUTPATIENT
Start: 2023-03-01 | End: 2023-03-04 | Stop reason: HOSPADM

## 2023-03-01 RX ORDER — SUCRALFATE 1 G/1
1 TABLET ORAL
Status: DISCONTINUED | OUTPATIENT
Start: 2023-03-01 | End: 2023-03-04 | Stop reason: HOSPADM

## 2023-03-01 RX ORDER — SPIRONOLACTONE 25 MG/1
25 TABLET ORAL DAILY
Status: DISCONTINUED | OUTPATIENT
Start: 2023-03-01 | End: 2023-03-04

## 2023-03-01 RX ORDER — AMLODIPINE BESYLATE 5 MG/1
10 TABLET ORAL DAILY
Status: DISCONTINUED | OUTPATIENT
Start: 2023-03-01 | End: 2023-03-01

## 2023-03-01 RX ORDER — POLYETHYLENE GLYCOL 3350 17 G/17G
17 POWDER, FOR SOLUTION ORAL 2 TIMES DAILY
Status: DISCONTINUED | OUTPATIENT
Start: 2023-03-01 | End: 2023-03-04 | Stop reason: HOSPADM

## 2023-03-01 RX ORDER — ACETAMINOPHEN 325 MG/1
650 TABLET ORAL EVERY 4 HOURS PRN
Status: DISCONTINUED | OUTPATIENT
Start: 2023-03-01 | End: 2023-03-04 | Stop reason: HOSPADM

## 2023-03-01 RX ORDER — NIFEDIPINE 60 MG/1
60 TABLET, EXTENDED RELEASE ORAL DAILY
Status: DISCONTINUED | OUTPATIENT
Start: 2023-03-02 | End: 2023-03-01

## 2023-03-01 RX ORDER — ERYTHROMYCIN 5 MG/G
OINTMENT OPHTHALMIC 3 TIMES DAILY
Status: DISCONTINUED | OUTPATIENT
Start: 2023-03-01 | End: 2023-03-04 | Stop reason: HOSPADM

## 2023-03-01 RX ORDER — NIFEDIPINE 60 MG/1
60 TABLET, EXTENDED RELEASE ORAL DAILY
Status: DISCONTINUED | OUTPATIENT
Start: 2023-03-01 | End: 2023-03-02

## 2023-03-01 RX ORDER — IBUPROFEN 200 MG
24 TABLET ORAL
Status: DISCONTINUED | OUTPATIENT
Start: 2023-03-01 | End: 2023-03-04 | Stop reason: HOSPADM

## 2023-03-01 RX ORDER — HYDRALAZINE HYDROCHLORIDE 50 MG/1
50 TABLET, FILM COATED ORAL EVERY 8 HOURS
Status: DISCONTINUED | OUTPATIENT
Start: 2023-03-01 | End: 2023-03-01

## 2023-03-01 RX ORDER — NITROGLYCERIN 0.4 MG/1
0.4 TABLET SUBLINGUAL EVERY 5 MIN PRN
Status: COMPLETED | OUTPATIENT
Start: 2023-03-01 | End: 2023-03-01

## 2023-03-01 RX ORDER — HYDRALAZINE HYDROCHLORIDE 20 MG/ML
10 INJECTION INTRAMUSCULAR; INTRAVENOUS
Status: DISCONTINUED | OUTPATIENT
Start: 2023-03-01 | End: 2023-03-04 | Stop reason: HOSPADM

## 2023-03-01 RX ORDER — ENOXAPARIN SODIUM 100 MG/ML
40 INJECTION SUBCUTANEOUS EVERY 24 HOURS
Status: DISCONTINUED | OUTPATIENT
Start: 2023-03-01 | End: 2023-03-04 | Stop reason: HOSPADM

## 2023-03-01 RX ORDER — LANOLIN ALCOHOL/MO/W.PET/CERES
1 CREAM (GRAM) TOPICAL DAILY
Status: DISCONTINUED | OUTPATIENT
Start: 2023-03-01 | End: 2023-03-04 | Stop reason: HOSPADM

## 2023-03-01 RX ORDER — CLONIDINE HYDROCHLORIDE 0.1 MG/1
0.1 TABLET ORAL 3 TIMES DAILY
Status: DISCONTINUED | OUTPATIENT
Start: 2023-03-01 | End: 2023-03-02

## 2023-03-01 RX ORDER — GLUCAGON 1 MG
1 KIT INJECTION
Status: DISCONTINUED | OUTPATIENT
Start: 2023-03-01 | End: 2023-03-04 | Stop reason: HOSPADM

## 2023-03-01 RX ORDER — HYDRALAZINE HYDROCHLORIDE 20 MG/ML
20 INJECTION INTRAMUSCULAR; INTRAVENOUS
Status: COMPLETED | OUTPATIENT
Start: 2023-03-01 | End: 2023-03-01

## 2023-03-01 RX ORDER — FUROSEMIDE 10 MG/ML
40 INJECTION INTRAMUSCULAR; INTRAVENOUS
Status: COMPLETED | OUTPATIENT
Start: 2023-03-01 | End: 2023-03-02

## 2023-03-01 RX ORDER — ASPIRIN 325 MG
325 TABLET ORAL
Status: COMPLETED | OUTPATIENT
Start: 2023-03-01 | End: 2023-03-01

## 2023-03-01 RX ORDER — TRAMADOL HYDROCHLORIDE 50 MG/1
50 TABLET ORAL 3 TIMES DAILY PRN
Status: DISCONTINUED | OUTPATIENT
Start: 2023-03-01 | End: 2023-03-04 | Stop reason: HOSPADM

## 2023-03-01 RX ORDER — SODIUM CHLORIDE 0.9 % (FLUSH) 0.9 %
10 SYRINGE (ML) INJECTION EVERY 12 HOURS PRN
Status: DISCONTINUED | OUTPATIENT
Start: 2023-03-01 | End: 2023-03-04 | Stop reason: HOSPADM

## 2023-03-01 RX ORDER — TRAMADOL HYDROCHLORIDE 50 MG/1
50 TABLET ORAL 3 TIMES DAILY
Status: DISCONTINUED | OUTPATIENT
Start: 2023-03-01 | End: 2023-03-01

## 2023-03-01 RX ORDER — ACETAMINOPHEN 325 MG/1
650 TABLET ORAL EVERY 8 HOURS PRN
Status: DISCONTINUED | OUTPATIENT
Start: 2023-03-01 | End: 2023-03-04 | Stop reason: HOSPADM

## 2023-03-01 RX ADMIN — CLONIDINE HYDROCHLORIDE 0.1 MG: 0.1 TABLET ORAL at 06:03

## 2023-03-01 RX ADMIN — CHOLECALCIFEROL TAB 25 MCG (1000 UNIT) 2000 UNITS: 25 TAB at 11:03

## 2023-03-01 RX ADMIN — AMLODIPINE BESYLATE 10 MG: 5 TABLET ORAL at 11:03

## 2023-03-01 RX ADMIN — NITROGLYCERIN 0.4 MG: 0.4 TABLET, ORALLY DISINTEGRATING SUBLINGUAL at 07:03

## 2023-03-01 RX ADMIN — NAPHAZOLINE HYDROCHLORIDE AND PHENIRAMINE MALEATE 1 DROP: .25; 3 SOLUTION/ DROPS OPHTHALMIC at 01:03

## 2023-03-01 RX ADMIN — NIFEDIPINE 60 MG: 60 TABLET, FILM COATED, EXTENDED RELEASE ORAL at 09:03

## 2023-03-01 RX ADMIN — VALSARTAN 320 MG: 80 TABLET, FILM COATED ORAL at 11:03

## 2023-03-01 RX ADMIN — SUCRALFATE 1 G: 1 TABLET ORAL at 05:03

## 2023-03-01 RX ADMIN — SPIRONOLACTONE 25 MG: 25 TABLET ORAL at 11:03

## 2023-03-01 RX ADMIN — POLYETHYLENE GLYCOL 3350 17 G: 17 POWDER, FOR SOLUTION ORAL at 09:03

## 2023-03-01 RX ADMIN — SUCRALFATE 1 G: 1 TABLET ORAL at 08:03

## 2023-03-01 RX ADMIN — HYDRALAZINE HYDROCHLORIDE 10 MG: 20 INJECTION INTRAMUSCULAR; INTRAVENOUS at 12:03

## 2023-03-01 RX ADMIN — ERYTHROMYCIN: 5 OINTMENT OPHTHALMIC at 10:03

## 2023-03-01 RX ADMIN — FERROUS SULFATE TAB 325 MG (65 MG ELEMENTAL FE) 1 EACH: 325 (65 FE) TAB at 11:03

## 2023-03-01 RX ADMIN — ASPIRIN 325 MG ORAL TABLET 325 MG: 325 PILL ORAL at 07:03

## 2023-03-01 RX ADMIN — HYDRALAZINE HYDROCHLORIDE 20 MG: 20 INJECTION INTRAMUSCULAR; INTRAVENOUS at 05:03

## 2023-03-01 RX ADMIN — HYDRALAZINE HYDROCHLORIDE 50 MG: 50 TABLET, FILM COATED ORAL at 02:03

## 2023-03-01 RX ADMIN — ENOXAPARIN SODIUM 40 MG: 40 INJECTION SUBCUTANEOUS at 05:03

## 2023-03-01 RX ADMIN — NAPHAZOLINE HYDROCHLORIDE AND PHENIRAMINE MALEATE 1 DROP: .25; 3 SOLUTION/ DROPS OPHTHALMIC at 10:03

## 2023-03-01 RX ADMIN — HYDRALAZINE HYDROCHLORIDE 100 MG: 50 TABLET, FILM COATED ORAL at 09:03

## 2023-03-01 RX ADMIN — TRAMADOL HYDROCHLORIDE 50 MG: 50 TABLET, COATED ORAL at 05:03

## 2023-03-01 RX ADMIN — NAPHAZOLINE HYDROCHLORIDE AND PHENIRAMINE MALEATE 1 DROP: .25; 3 SOLUTION/ DROPS OPHTHALMIC at 05:03

## 2023-03-01 RX ADMIN — SUCRALFATE 1 G: 1 TABLET ORAL at 11:03

## 2023-03-01 RX ADMIN — ONDANSETRON 4 MG: 2 INJECTION INTRAMUSCULAR; INTRAVENOUS at 10:03

## 2023-03-01 RX ADMIN — HYDRALAZINE HYDROCHLORIDE 10 MG: 20 INJECTION INTRAMUSCULAR; INTRAVENOUS at 08:03

## 2023-03-01 RX ADMIN — ERYTHROMYCIN 1 INCH: 5 OINTMENT OPHTHALMIC at 02:03

## 2023-03-01 RX ADMIN — HYDRALAZINE HYDROCHLORIDE 10 MG: 20 INJECTION INTRAMUSCULAR; INTRAVENOUS at 05:03

## 2023-03-01 RX ADMIN — LISINOPRIL 40 MG: 10 TABLET ORAL at 11:03

## 2023-03-01 RX ADMIN — CLONIDINE HYDROCHLORIDE 0.1 MG: 0.1 TABLET ORAL at 08:03

## 2023-03-01 RX ADMIN — NITROGLYCERIN 0.4 MG: 0.4 TABLET, ORALLY DISINTEGRATING SUBLINGUAL at 01:03

## 2023-03-01 RX ADMIN — FUROSEMIDE 40 MG: 10 INJECTION, SOLUTION INTRAMUSCULAR; INTRAVENOUS at 02:03

## 2023-03-01 NOTE — H&P
[facility] Medicine History & Physical Examination       Patient Name: Dayami Aleman  MRN: [id number]  Patient Class: IP- Inpatient   Admission Date: 2/28/2023   Admitting Physician: Karol Nevarez MD   Length of Stay: 0  Attending Physician: Karol Nevarez MD   Primary Care Provider: Agata Chiu MD  Face-to-Face encounter date: 03/01/2023  Code Status: Full Code  Chief Complaint: Hypertension (Pt states she had gone to  this morning for eye irritation and states her BP was elevated, pt has a hx of HTN, states compliant w/ medications, pt is now also c/o SOB, that she states only occurred when she arrived here, pt in no acute distress in triage, unsure if she has a hx of CHF, takes aldactone, denies increased leg edema, orthopnea, productive cough. Does not see a cardiologist)        Patient information was obtained from patient, patient's family, past medical records and ER records.     HISTORY OF PRESENT ILLNESS:   Dayami Aleman is a 77 y.o. Black or  female with a past medical history of hypertension, GERD, lumbar degenerative disc disease and obstructive sleep apnea noncompliant with CPAP. The patient presented to [facility] on 2/28/2023 with a primary complaint of high blood pressure.  Patient received urgent care yesterday for conjunctivitis and upper respiratory infection. Blood pressure at urgent care was elevated at 192/74.  She checked her blood pressure at home after leaving urgent care in systolic pressure was in the 200s prompting her to present to the ED. Patient reports she is been having intermittent chest pain located to the center of the upper chest which began last night (02/28/2023.  She describes chest pain as an achiness with radiation to the left side, duration lasting a few minutes, with associated shortness a breath and currently 3/10 on exam.  She also complains of nasal congestion and eye irritation.  She denies complaints of  headache, vision changes, diaphoresis, nausea and vomiting.    Upon presentation to the ED, temperature 98.4° F, heart rate 77, blood pressure 217/67, respiratory rate 21 and SpO2 98% on room air.  Labs with microcytic anemia which is stable, BNP 46, troponin 0.013 with repeat 0.010.  While in ED patient received full-dose aspirin, clonidine and hydralazine.  Cardiology consulted.  She is admitted to hospital medicine services and further medical management.    PAST MEDICAL HISTORY:   Hypertension  GERD  Obstructive sleep apnea noncompliant with CPAP   Lumbar disc disease   GERD    PAST SURGICAL HISTORY:   Colonoscopy   Hernia repair x2  Cholecystectomy    ALLERGIES:   Sulfamethoxazole-trimethoprim    FAMILY HISTORY:   Father: Cardiovascular disease, stroke  Mother: Cardiovascular disease, stroke  Brother: Stroke    SOCIAL HISTORY:   Denies tobacco, alcohol or illicit drug use    HOME MEDICATIONS:     Prior to Admission medications    Medication Sig Start Date End Date Taking? Authorizing Provider   amLODIPine (NORVASC) 10 MG tablet Take 1 tablet (10 mg total) by mouth once daily. 2/10/23 3/12/23 Yes Agata Chiu MD   aspirin (ECOTRIN) 325 MG EC tablet Take 1 tablet (325 mg total) by mouth once daily. 1/26/23 4/26/23 Yes Agata Chiu MD   candesartan (ATACAND) 32 MG tablet Take 1 tablet (32 mg total) by mouth once daily. 2/10/23 3/12/23 Yes Agata Chiu MD   ferrous sulfate (FEOSOL) 325 mg (65 mg iron) Tab tablet Take 1 tablet (325 mg total) by mouth daily with breakfast. 2/10/23 5/11/23 Yes Agata Chiu MD   hydrALAZINE (APRESOLINE) 50 MG tablet Take 1 tablet (50 mg total) by mouth every 8 (eight) hours. 2/10/23 3/12/23 Yes Agata Chiu MD   loratadine (CLARITIN) 10 mg tablet Take 1 tablet (10 mg total) by mouth once daily. 2/10/23 5/11/23 Yes Agata Chiu MD   promethazine (PHENERGAN) 25 MG tablet Take 1 tablet (25 mg total) by mouth every 6 (six) hours as needed (nausea and vomiting). 5/6/22  Yes  Nickolas Conrad MD   spironolactone (ALDACTONE) 25 MG tablet Take 1 tablet (25 mg total) by mouth once daily. 2/10/23 3/12/23 Yes Agata Chiu MD   sucralfate (CARAFATE) 1 gram tablet Take 1 tablet (1 g total) by mouth 4 (four) times daily before meals and nightly. 2/10/23  Yes Agata Chiu MD   traMADoL (ULTRAM) 50 mg tablet Take 1 tablet (50 mg total) by mouth 3 (three) times daily. 1/17/23 4/17/23 Yes Agata Chiu MD   vitamin D (VITAMIN D3) 1000 units Tab Take 2 tablets (2,000 Units total) by mouth once daily. 2/10/23  Yes Agata Chiu MD   cimetidine (TAGAMET) 200 MG tablet Take 1 tablet (200 mg total) by mouth 4 (four) times daily. 2/10/23 3/12/23  Agata Chiu MD   erythromycin (ROMYCIN) ophthalmic ointment Place into both eyes 3 (three) times daily. for 5 days 2/28/23 3/5/23  Arvind Ramirez MD   naphazoline-pheniramine 0.025-0.3% (NAPHCON-A) 0.025-0.3 % ophthalmic solution Place 1 drop into both eyes 4 (four) times daily. for 4 days 2/28/23 3/4/23  Arvind Ramirez MD       REVIEW OF SYSTEMS:   Except as documented, all other systems reviewed and negative     PHYSICAL EXAM:     VITAL SIGNS: 24 HRS MIN & MAX LAST   Temp  Min: 98.4 °F (36.9 °C)  Max: 98.5 °F (36.9 °C) 98.4 °F (36.9 °C)   BP  Min: 152/61  Max: 217/67 (!) 182/80     Pulse  Min: 53  Max: 83  83   Resp  Min: 18  Max: 21 19   SpO2  Min: 96 %  Max: 98 % 97 %       General appearance: Well-developed, well-nourished female in no apparent distress. Son at bedside.  HEENT: Atraumatic head. Moist mucous membranes of oral cavity.  Lungs: Clear to auscultation bilaterally.   Heart: Regular rate and rhythm with murmur.   Abdomen:  Obese, non-distended.   Extremities: No cyanosis, clubbing. No deformities.  Skin: No Rash. Warm and dry.  Neuro: Awake, alert and oriented. Motor and sensory exams grossly intact.  Psych/mental status: Appropriate mood and affect. Cooperative. Responds appropriately to questions.       LABS AND IMAGING:     Recent Labs    Lab 02/28/23  2220   WBC 6.0   RBC 4.84   HGB 11.7*   HCT 37.3   MCV 77.1*   MCH 24.2   MCHC 31.4*   RDW 18.2*      MPV 10.5*       Recent Labs   Lab 02/28/23  2220      K 4.0   CO2 28   BUN 10.7   CREATININE 0.97   CALCIUM 9.5   ALBUMIN 3.5   ALKPHOS 100   ALT 33   AST 20   BILITOT 0.2       Microbiology Results (last 7 days)       ** No results found for the last 168 hours. **             X-Ray Chest PA And Lateral  Narrative: EXAMINATION:  XR CHEST PA AND LATERAL    CLINICAL HISTORY:  , Shortness of breath.    COMPARISON:  July 21, 2018    FINDINGS:  No alveolar consolidation, effusion, or pneumothorax is seen.   The thoracic aorta is normal  cardiac silhouette, central pulmonary vessels and mediastinum are normal in size and are grossly unremarkable.   visualized osseous structures are grossly unremarkable.  Impression: No acute chest disease is identified.    Electronically signed by: Amrit Nguyen  Date:    03/01/2023  Time:    06:13        ASSESSMENT & PLAN:   Assessment:  Hypertensive urgency  Acute chest pain   Microcytic anemia  History of hypertension, GERD, obstructive sleep apnea on CPAP    Plan:  - Trend troponin x 2 0.010  - Telemetry monitoring  - IV Hydralazine as needed for hypertension  - Resume appropriate home medications including antihypertensives  - Cardiology consulted.  Appreciate recommendation   - Labs in AM      VTE Prophylaxis: will be placed on Lovenox  for DVT prophylaxis and will be advised to be as mobile as possible and sit in a chair as tolerated      __________________________________________________________________________  INPATIENT LIST OF MEDICATIONS     Current Facility-Administered Medications:     acetaminophen tablet 650 mg, 650 mg, Oral, Q8H PRN, Yana Molina PA-C    acetaminophen tablet 650 mg, 650 mg, Oral, Q4H PRN, Yana Molina PA-C    dextrose 10% bolus 125 mL 125 mL, 12.5 g, Intravenous, PRN, Karol Nevarez MD    dextrose 10% bolus 250  mL 250 mL, 25 g, Intravenous, PRN, Karol Nevarez MD    enoxaparin injection 40 mg, 40 mg, Subcutaneous, Daily, Yana Molina PA-C    glucagon (human recombinant) injection 1 mg, 1 mg, Intramuscular, PRN, Yana Molina PA-C    glucose chewable tablet 16 g, 16 g, Oral, PRN, MARTINEZ Mujica-LIOR    glucose chewable tablet 24 g, 24 g, Oral, PRN, Yana Molina PA-C    hydrALAZINE injection 10 mg, 10 mg, Intravenous, Q2H PRN, Yana Molina PA-C    nitroGLYCERIN SL tablet 0.4 mg, 0.4 mg, Sublingual, Q5 Min PRN, Marilee Bateman MD, 0.4 mg at 03/01/23 0755    ondansetron injection 4 mg, 4 mg, Intravenous, Q4H PRN, Yana Molina PA-C    sodium chloride 0.9% flush 10 mL, 10 mL, Intravenous, Q12H PRN, Yana Molina PA-C    Current Outpatient Medications:     amLODIPine (NORVASC) 10 MG tablet, Take 1 tablet (10 mg total) by mouth once daily., Disp: 30 tablet, Rfl: 0    aspirin (ECOTRIN) 325 MG EC tablet, Take 1 tablet (325 mg total) by mouth once daily., Disp: 30 tablet, Rfl: 0    candesartan (ATACAND) 32 MG tablet, Take 1 tablet (32 mg total) by mouth once daily., Disp: 30 tablet, Rfl: 0    ferrous sulfate (FEOSOL) 325 mg (65 mg iron) Tab tablet, Take 1 tablet (325 mg total) by mouth daily with breakfast., Disp: 30 tablet, Rfl: 2    hydrALAZINE (APRESOLINE) 50 MG tablet, Take 1 tablet (50 mg total) by mouth every 8 (eight) hours., Disp: 90 tablet, Rfl: 0    loratadine (CLARITIN) 10 mg tablet, Take 1 tablet (10 mg total) by mouth once daily., Disp: 30 tablet, Rfl: 2    promethazine (PHENERGAN) 25 MG tablet, Take 1 tablet (25 mg total) by mouth every 6 (six) hours as needed (nausea and vomiting)., Disp: 28 tablet, Rfl: 2    spironolactone (ALDACTONE) 25 MG tablet, Take 1 tablet (25 mg total) by mouth once daily., Disp: 30 tablet, Rfl: 0    sucralfate (CARAFATE) 1 gram tablet, Take 1 tablet (1 g total) by mouth 4 (four) times daily before meals and nightly., Disp: 120 tablet, Rfl: 2    traMADoL  (ULTRAM) 50 mg tablet, Take 1 tablet (50 mg total) by mouth 3 (three) times daily., Disp: 90 tablet, Rfl: 2    vitamin D (VITAMIN D3) 1000 units Tab, Take 2 tablets (2,000 Units total) by mouth once daily., Disp: 60 tablet, Rfl: 5    cimetidine (TAGAMET) 200 MG tablet, Take 1 tablet (200 mg total) by mouth 4 (four) times daily., Disp: 120 tablet, Rfl: 0    erythromycin (ROMYCIN) ophthalmic ointment, Place into both eyes 3 (three) times daily. for 5 days, Disp: 3.5 g, Rfl: 0    naphazoline-pheniramine 0.025-0.3% (NAPHCON-A) 0.025-0.3 % ophthalmic solution, Place 1 drop into both eyes 4 (four) times daily. for 4 days, Disp: 5 mL, Rfl: 0      Scheduled Meds:   enoxaparin  40 mg Subcutaneous Daily     Continuous Infusions:  PRN Meds:.acetaminophen, acetaminophen, dextrose 10%, dextrose 10%, glucagon (human recombinant), glucose, glucose, hydrALAZINE, nitroGLYCERIN, ondansetron, sodium chloride 0.9%      Discharge Planning and Disposition: Anticipated discharge to be determined.    Yana PETERS PA, have reviewed and discussed the case with Dr. Karol Nevarez MD    Please see the following addendum for further assessment and plan from there attending MD.    Yana Molina PA-C  03/01/2023    ________________________________________________________________________________    MD Addendum:  IDr. Karol MD assumed care of this patient today at ---am/pm  For the patient encounter, I performed the substantive portion of the visit, I reviewed the NP/PA documentation, treatment plan, and medical decision making.  I had face to face time with this patient     A. History: 77-year-old  female with significant history of HTN, GERD, vitamin-D deficiency, obstructive sleep apnea, lumbar disc disease.  Patient presented to the hospital with complaints of elevated blood pressure readings with systolic more than 200.  She admitted compliance to all her blood pressure medications at home.  Patient was  significantly hypertensive in the ED with systolic ranging in 190s to 200s.  Labs essentially unremarkable with negative troponins .  Chest x-ray was negative.  Hospitalist team was consulted for admission.    B. Physical exam:  As above    C. Medical decision making:    Poorly-controlled HTN/hypertensive urgency  Recent diagnosis-bacterial conjunctivitis  History of GERD   Vitamin-D deficiency  Obstructive sleep apnea   Chronic back pain secondary to lumbar disc disease  Prophylaxis    BP severely elevated   Received amlodipine in the morning  Given significant elevation switched to nifedipine   Hydralazine increased to 100 mg t.i.d.  Resumed lisinopril 40 mg daily   Also on Aldactone 25 mg daily and added clonidine 0.1 mg t.i.d.  Continue to make modifications as needed   Cardiology initiated her on IV diuresis   Follow-up echocardiogram  Patient has no active chest pain or dyspnea  Negative troponins  Resumed home meds-erythromycin eyedrops, oral iron, bowel regimen, Carafate, vitamin-D  DVT prophylaxis-Lovenox    Critical care time-45 minutes  Critical care diagnosis-hypertensive urgency requiring p.r.n. IV antihypertensives  Critical care interventions: Hands-on evaluation, review of labs/radiographs/records and discussions with patient       All diagnosis and differential diagnosis have been reviewed; assessment and plan has been documented; I have personally reviewed the labs and test results that are presently available; I have reviewed the patients medication list; I have reviewed the consulting providers response and recommendations. I have reviewed or attempted to review medical records based upon their availability.    All of the patient and family questions have been addressed and answered. Patient's is agreeable to the above stated plan. I will continue to monitor closely and make adjustments to medical management as needed.      Karol Nevarez MD  03/01/2023

## 2023-03-01 NOTE — FIRST PROVIDER EVALUATION
Medical screening examination initiated.  I have conducted a focused provider triage encounter, findings are as follows:    Brief history of present illness:  Patient presents to the emergency room with elevated blood pressure.  Patient also has shortness of breath    There were no vitals filed for this visit.    Pertinent physical exam:  Awake, alert, oriented x3.  Trachea midline.  No Acute distress.  No abdominal distention.  No all extremities.  Neuro intact.  Psych normal.    Brief workup plan:  Lab studies, medication, and further evaluation by ED provider    Preliminary workup initiated; this workup will be continued and followed by the physician or advanced practice provider that is assigned to the patient when roomed.

## 2023-03-01 NOTE — ED PROVIDER NOTES
Encounter Date: 2/28/2023    SCRIBE #1 NOTE: I, Chris Elizondo, am scribing for, and in the presence of,  Dr. Bateman. I have scribed the following portions of the note - Other sections scribed: HPI, ROS, Physical Exam, MDM, Attending.     History     Chief Complaint   Patient presents with    Hypertension     Pt states she had gone to  this morning for eye irritation and states her BP was elevated, pt has a hx of HTN, states compliant w/ medications, pt is now also c/o SOB, that she states only occurred when she arrived here, pt in no acute distress in triage, unsure if she has a hx of CHF, takes aldactone, denies increased leg edema, orthopnea, productive cough. Does not see a cardiologist     78 y/o BF with history of hypertension on multiple medications (tid) presents to ED for elevated blood pressure readings. Pt was seen at urgent care yesterday for conjunctivitis and told  then her blood pressure was high.  When she got home, she checked it again and it was 207 systolic so she decided to come to ED. She presented last night on 2/28/23 around 10pm but has been waiting in [facility] for a bed to open up. Placed in room 18 shortly before I began my shift.Pt denies chest pain, palpitations, dizziness, headaches, change in vision, nausea, vomiting, syncope or near syncope.  She states that since arrival, she has noted SMITH but Denies orthopnea or history of heart failure  She claims compliance with her medications.  She could not name her medications.  It looks like she was given clonidine upon arrival last night and The nurse tells me that he just gave her hydralazine IV which was ordered from the mid-level upfront.    The history is provided by the patient and medical records.   Hypertension   This is a chronic problem. The current episode started yesterday. Associated symptoms include shortness of breath (with exertion). Pertinent negatives include no chest pain, no palpitations, no headaches, no peripheral edema  and no dizziness. Risk factors include obesity and post menopause.   Review of patient's allergies indicates:   Allergen Reactions    Sulfamethoxazole-trimethoprim Hives     Past Medical History:   Diagnosis Date    GERD (gastroesophageal reflux disease)     Hypertension     Other seasonal allergic rhinitis     Unspecified osteoarthritis, unspecified site     Vitamin D deficiency      Past Surgical History:   Procedure Laterality Date    NO PAST SURGERIES       Family History   Problem Relation Age of Onset    Hypertension Mother     Stroke Mother     Hypertension Father     Stroke Father     Stroke Brother      Social History     Tobacco Use    Smoking status: Never    Smokeless tobacco: Never   Substance Use Topics    Alcohol use: Not Currently    Drug use: Never     Review of Systems   Eyes:  Positive for discharge and itching.   Respiratory:  Positive for shortness of breath (with exertion).    Cardiovascular:  Negative for chest pain, palpitations and leg swelling.   Gastrointestinal: Negative.    Musculoskeletal: Negative.    Skin: Negative.    Neurological:  Negative for dizziness, tremors, seizures, syncope, facial asymmetry, speech difficulty, weakness, light-headedness, numbness and headaches.     Physical Exam     Initial Vitals [02/28/23 2159]   BP Pulse Resp Temp SpO2   (!) 217/67 77 (!) 21 98.4 °F (36.9 °C) 98 %      MAP       --         Physical Exam    Nursing note and vitals reviewed.  Constitutional: She is Obese . She does not appear ill. No distress.   HENT:   Head: Normocephalic and atraumatic.   Right Ear: Tympanic membrane normal.   Left Ear: Tympanic membrane normal.   Mouth/Throat: Oropharynx is clear and moist. No oropharyngeal exudate or posterior oropharyngeal erythema.   Eyes: EOM are normal. Pupils are equal, round, and reactive to light. Right eye exhibits discharge and exudate. Left eye exhibits discharge and exudate.   Neck: Trachea normal. Neck supple. No tracheal tenderness  present. Carotid bruit is not present.   Cardiovascular:  Normal rate, regular rhythm and normal pulses.           Murmur (2/6 systolic) heard.  Systolic murmur is present with a grade of 2/6.  Pulmonary/Chest: Breath sounds normal. No respiratory distress.   Clear breath sounds bilaterally without crackles, rales or rhonchi   Abdominal: Abdomen is soft. Bowel sounds are normal. There is no abdominal tenderness.   Large abdomen   Musculoskeletal:         General: Normal range of motion.      Cervical back: Neck supple.     Neurological: She is alert and oriented to person, place, and time. She has normal strength. No cranial nerve deficit or sensory deficit. She displays a negative Romberg sign. GCS eye subscore is 4. GCS verbal subscore is 5. GCS motor subscore is 6.   Skin: Skin is warm, dry and intact. No cyanosis.       ED Course   Procedures  Labs Reviewed   COMPREHENSIVE METABOLIC PANEL - Abnormal; Notable for the following components:       Result Value    Glucose Level 123 (*)     Globulin 3.6 (*)     Albumin/Globulin Ratio 1.0 (*)     All other components within normal limits   CBC WITH DIFFERENTIAL - Abnormal; Notable for the following components:    Hgb 11.7 (*)     MCV 77.1 (*)     MCHC 31.4 (*)     RDW 18.2 (*)     MPV 10.5 (*)     All other components within normal limits   B-TYPE NATRIURETIC PEPTIDE - Normal   TROPONIN I - Normal   TROPONIN I - Normal   CBC W/ AUTO DIFFERENTIAL    Narrative:     The following orders were created for panel order CBC auto differential.  Procedure                               Abnormality         Status                     ---------                               -----------         ------                     CBC with Differential[[id number]]        Abnormal            Final result                 Please view results for these tests on the individual orders.     EKG Readings: (Independently Interpreted)   Initial Reading: No STEMI. Ectopy: No Ectopy.   Normal sinus rhythm  at 68 beats per minute performed at 10:07 p.m. on 02/28/2023.  Nonspecific STT wave changes.  Compared to EKG in September of 2021 without change.     Imaging Results              X-Ray Chest PA And Lateral (Final result)  Result time 03/01/23 06:13:02      Final result by Amrit Nguyen MD (03/01/23 06:13:02)                   Impression:      No acute chest disease is identified.      Electronically signed by: Amrit Nguyen  Date:    03/01/2023  Time:    06:13               Narrative:    EXAMINATION:  XR CHEST PA AND LATERAL    CLINICAL HISTORY:  , Shortness of breath.    COMPARISON:  July 21, 2018    FINDINGS:  No alveolar consolidation, effusion, or pneumothorax is seen.   The thoracic aorta is normal  cardiac silhouette, central pulmonary vessels and mediastinum are normal in size and are grossly unremarkable.   visualized osseous structures are grossly unremarkable.                                       Medications   cloNIDine tablet 0.2 mg (0.2 mg Oral Given 2/28/23 9285)   hydrALAZINE injection 20 mg (20 mg Intravenous Given 3/1/23 4749)     Medical Decision Making:   History:   Old Medical Records: I decided to obtain old medical records.  Old Records Summarized: records from clinic visits, records from previous admission(s) and records from another hospital.       <> Summary of Records: I researched old records in epic as well as [email or web address].  I found an echocardiogram dated August 2021 which was performed because of a murmur.  It noted a structurally normal aortic valve and tricuspid valve and trace mitral regurgitation.  EF 55-60%  Compared today's EKG to the one in September of 2021:  It is the same without change.  Differential Diagnosis:   Differential diagnoses include, but are not limited to: hypertensive urgency, congestive heart failure, angina, medication noncompliance  Clinical Tests:   Lab Tests: Reviewed  The following lab test(s) were unremarkable: CBC and CMP       <> Summary of Lab: 2-  troponin tests noted  Radiological Study: Reviewed  Medical Tests: Reviewed  ED Management:  Patient was told that she had elevated blood pressure readings yesterday at the urgent [facility] when she was seen for her conjunctivitis.  She says they told her that if it stayed high at home she should go to the emergency department which is what she did.  She is been her all night and has not had any chest pain, syncope or near syncope.  She was given clonidine last night and hydralazine this morning .  I looked in the old records and found an echo from 2021.  I compared today's EKG to the 1 also done in 2021.  I reviewed the records from her [facility] clinic visits. A jan 2022 [facility] family med clinic: Candesartan 32mg, Hydralazine 50mg TID, Spironolactone 25mg qD, and Amlodipine 10mg  Pt began to have dull anterior chest pain; still feels a little sob; bp responded to meds; plan admit. Given asa and sl ntg        Scribe Attestation:   Scribe #1: I performed the above scribed service and the documentation accurately describes the services I performed. I attest to the accuracy of the note.    Attending Attestation:           Physician Attestation for Scribe:  Physician Attestation Statement for Scribe #1: I, reviewed documentation, as scribed by Chris Elizondo in my presence, and it is both accurate and complete.         Medical Decision Making  Amount and/or Complexity of Data Reviewed  External Data Reviewed: notes.  Labs: ordered.  Radiology: ordered.  ECG/medicine tests: ordered.            ED Course as of 03/01/23 0700   Wed Mar 01, 2023   0656 BP(!): 182/80  Pt awake; now c/o chest pain sensations on anterior chest as well as still feels SMITH (when went to bathroom). I am going to admit for further cardiac workup; I will ASA and sl NTG [RD]      ED Course User Index  [RD] Marilee Bateman MD                 Clinical Impression:   Final diagnoses:  [R06.02] SOB (shortness of breath)  [I16.0] Hypertensive urgency  (Primary)  [R07.9] Chest pain, unspecified type        ED Disposition Condition    Admit Stable                Marilee Bateman MD  03/01/23 0770

## 2023-03-01 NOTE — PLAN OF CARE
Pt is  with 5 living children. No living will or POA. Emergency contacts daughter Reynold Hawkins travel nurse lives sometimes with pt [phone] or son lives locally Carolyn Hawkins [phone]. Has straight cane walk in shower, cpap, bp cuff. PCP [facility] FM Dr. Chiu. No agency involvement. Denies known needs prior to dc.    03/01/23 1146   Discharge Assessment   Assessment Type Discharge Planning Assessment   Confirmed/corrected address, phone number and insurance Yes   Confirmed Demographics Correct on Facesheet   Source of Information patient   When was your last doctors appointment?   (PCP [facility] FM Dr. Chiu)   Communicated THELMA with patient/caregiver Date not available/Unable to determine   People in Home child(janae), adult   Do you have help at home or someone to help you manage your care at home? Yes  (daughter traveling nurse lives at home sometimes Reynold Hawkins [phone]/ son lives locally Carolyn Hawkins [phone])   Prior to hospitilization cognitive status: Unable to Assess   Current cognitive status: Alert/Oriented   Walking or Climbing Stairs ambulation difficulty, requires equipment   Mobility Management straight cane   Dressing/Bathing   (walk in shower)   Home Accessibility wheelchair accessible  (ramp)   Home Layout Able to live on 1st floor   Equipment Currently Used at Home cane, straight;CPAP;other (see comments)  (BP cuff, walk in shower)   Readmission within 30 days? No   Patient currently being followed by outpatient case management? No   Do you currently have service(s) that help you manage your care at home? No   Do you take prescription medications? Yes  (fills with [facility] on [address])   Do you have prescription coverage? Yes   Do you have any problems affording any of your prescribed medications? No   Is the patient taking medications as prescribed? yes   Who is going to help you get home at discharge? son   How do you get to doctors appointments? car, drives self   Are you  on dialysis? No   Do you take coumadin? No   Discharge Plan A Home with family   DME Needed Upon Discharge  none   Discharge Plan discussed with: Patient   Discharge Barriers Identified None   OTHER   Name(s) of People in Home Reynold Hawkins daughter traveling nurse at home sometime

## 2023-03-01 NOTE — CONSULTS
Inpatient consult to Cardiology  Consult performed by: RASHEEDA Garcia  Consult ordered by: Marilee Bateman MD  Reason for consult: Hypertension      [facility] - Emergency Dept  Cardiology  Consult Note    Patient Name: Dayami Aleman  MRN: [id number]  Admission Date: 2/28/2023  Hospital Length of Stay: 0 days  Code Status: Full Code   Attending Provider: Karol Nevarez MD   Consulting Provider: RASHEEDA Garcia  Primary Care Physician: Agata Chiu MD  Principal Problem:<principal problem not specified>    Patient information was obtained from patient, past medical records, ER records, and primary team.     Subjective:   Consultation Reason: Hypertensive Urgency/Shortness of Breath    HPI:   Ms. Aleman is a 77 year old female, unknown to [facility], who presented to the hospital with elevated blood pressure readings. Systolic readings greater than 200. Was recently treated for conjunctivitis at urgent care. Troponin values normal. Chest Radiograph revealed no acute disease. Labs are grossly stable. BNP is 46. Patient admitted to Hospital Medicine Service. [facility] consulted for blood pressure recommendations.    PMH: Hypertension, Osteoarthritis, GERD, Allergic Rhinitis, Vitamin D Deficiency  PSH: Negative  Family History: Mother- Hypertension/Stroke, Father- Hypertension/Stroke, Brother- Stroke  Social History: Tobacco- Negative, Alcohol- Negative, Substance Abuse- Negative    Previous Cardiac Diagnostics:   Echocardiogram (8.19.21):  EF 55-60%    Review of patient's allergies indicates:   Allergen Reactions    Sulfamethoxazole-trimethoprim Hives       No current facility-administered medications on file prior to encounter.     Current Outpatient Medications on File Prior to Encounter   Medication Sig    amLODIPine (NORVASC) 10 MG tablet Take 1 tablet (10 mg total) by mouth once daily.    aspirin (ECOTRIN) 325 MG EC tablet Take 1 tablet (325 mg total) by mouth once daily.    candesartan (ATACAND) 32  MG tablet Take 1 tablet (32 mg total) by mouth once daily.    ferrous sulfate (FEOSOL) 325 mg (65 mg iron) Tab tablet Take 1 tablet (325 mg total) by mouth daily with breakfast.    hydrALAZINE (APRESOLINE) 50 MG tablet Take 1 tablet (50 mg total) by mouth every 8 (eight) hours.    loratadine (CLARITIN) 10 mg tablet Take 1 tablet (10 mg total) by mouth once daily.    promethazine (PHENERGAN) 25 MG tablet Take 1 tablet (25 mg total) by mouth every 6 (six) hours as needed (nausea and vomiting).    spironolactone (ALDACTONE) 25 MG tablet Take 1 tablet (25 mg total) by mouth once daily.    sucralfate (CARAFATE) 1 gram tablet Take 1 tablet (1 g total) by mouth 4 (four) times daily before meals and nightly.    traMADoL (ULTRAM) 50 mg tablet Take 1 tablet (50 mg total) by mouth 3 (three) times daily.    vitamin D (VITAMIN D3) 1000 units Tab Take 2 tablets (2,000 Units total) by mouth once daily.    cimetidine (TAGAMET) 200 MG tablet Take 1 tablet (200 mg total) by mouth 4 (four) times daily.    erythromycin (ROMYCIN) ophthalmic ointment Place into both eyes 3 (three) times daily. for 5 days    naphazoline-pheniramine 0.025-0.3% (NAPHCON-A) 0.025-0.3 % ophthalmic solution Place 1 drop into both eyes 4 (four) times daily. for 4 days     Review of Systems   Respiratory:  Positive for shortness of breath. Negative for chest tightness.    Cardiovascular:  Negative for chest pain.   All other systems reviewed and are negative.    Objective:     Vital Signs (Most Recent):  Temp: 98.4 °F (36.9 °C) (02/28/23 2159)  Pulse: 80 (03/01/23 0839)  Resp: (!) 23 (03/01/23 0839)  BP: (!) 181/85 (03/01/23 0840)  SpO2: 97 % (03/01/23 0839)   Vital Signs (24h Range):  Temp:  [98.4 °F (36.9 °C)-98.5 °F (36.9 °C)] 98.4 °F (36.9 °C)  Pulse:  [53-86] 80  Resp:  [12-23] 23  SpO2:  [95 %-98 %] 97 %  BP: (147-217)/(58-98) 181/85     Weight: 113.4 kg (250 lb)  Body mass index is 42.91 kg/m².    SpO2: 97 %       No intake or output data in the 24 hours  ending 03/01/23 0943    Lines/Drains/Airways       Peripheral Intravenous Line  Duration                  Peripheral IV - Single Lumen 03/01/23 0550 18 G Right Antecubital <1 day                    Significant Labs:  Recent Results (from the past 72 hour(s))   Brain natriuretic peptide    Collection Time: 02/28/23 10:20 PM   Result Value Ref Range    Natriuretic Peptide 46.0 <=100.0 pg/mL   Comprehensive metabolic panel    Collection Time: 02/28/23 10:20 PM   Result Value Ref Range    Sodium Level 140 136 - 145 mmol/L    Potassium Level 4.0 3.5 - 5.1 mmol/L    Chloride 104 98 - 107 mmol/L    Carbon Dioxide 28 23 - 31 mmol/L    Glucose Level 123 (H) 82 - 115 mg/dL    Blood Urea Nitrogen 10.7 9.8 - 20.1 mg/dL    Creatinine 0.97 0.55 - 1.02 mg/dL    Calcium Level Total 9.5 8.4 - 10.2 mg/dL    Protein Total 7.1 5.8 - 7.6 gm/dL    Albumin Level 3.5 3.4 - 4.8 g/dL    Globulin 3.6 (H) 2.4 - 3.5 gm/dL    Albumin/Globulin Ratio 1.0 (L) 1.1 - 2.0 ratio    Bilirubin Total 0.2 <=1.5 mg/dL    Alkaline Phosphatase 100 40 - 150 unit/L    Alanine Aminotransferase 33 0 - 55 unit/L    Aspartate Aminotransferase 20 5 - 34 unit/L    eGFR 60 mls/min/1.73/m2   Troponin I    Collection Time: 02/28/23 10:20 PM   Result Value Ref Range    Troponin-I 0.013 0.000 - 0.045 ng/mL   CBC with Differential    Collection Time: 02/28/23 10:20 PM   Result Value Ref Range    WBC 6.0 4.5 - 11.5 x10(3)/mcL    RBC 4.84 4.20 - 5.40 x10(6)/mcL    Hgb 11.7 (L) 12.0 - 16.0 g/dL    Hct 37.3 37.0 - 47.0 %    MCV 77.1 (L) 80.0 - 94.0 fL    MCH 24.2 pg    MCHC 31.4 (L) 33.0 - 36.0 g/dL    RDW 18.2 (H) 11.5 - 17.0 %    Platelet 213 130 - 400 x10(3)/mcL    MPV 10.5 (H) 7.4 - 10.4 fL    Neut % 48.0 %    Lymph % 36.9 %    Mono % 12.1 %    Eos % 2.0 %    Basophil % 0.8 %    Lymph # 2.22 0.6 - 4.6 x10(3)/mcL    Neut # 2.88 2.1 - 9.2 x10(3)/mcL    Mono # 0.73 0.1 - 1.3 x10(3)/mcL    Eos # 0.12 0 - 0.9 x10(3)/mcL    Baso # 0.05 0 - 0.2 x10(3)/mcL    IG# 0.01 0 - 0.04  x10(3)/mcL    IG% 0.2 %    NRBC% 0.0 %   Troponin I    Collection Time: 03/01/23  4:08 AM   Result Value Ref Range    Troponin-I <0.010 0.000 - 0.045 ng/mL   Troponin I    Collection Time: 03/01/23  8:10 AM   Result Value Ref Range    Troponin-I <0.010 0.000 - 0.045 ng/mL       Significant Imaging:  Imaging Results              X-Ray Chest PA And Lateral (Final result)  Result time 03/01/23 06:13:02      Final result by Amrit Nguyen MD (03/01/23 06:13:02)                   Impression:      No acute chest disease is identified.      Electronically signed by: Amrit Nguyen  Date:    03/01/2023  Time:    06:13               Narrative:    EXAMINATION:  XR CHEST PA AND LATERAL    CLINICAL HISTORY:  , Shortness of breath.    COMPARISON:  July 21, 2018    FINDINGS:  No alveolar consolidation, effusion, or pneumothorax is seen.   The thoracic aorta is normal  cardiac silhouette, central pulmonary vessels and mediastinum are normal in size and are grossly unremarkable.   visualized osseous structures are grossly unremarkable.                                    Telemetry:  Sinus Rhythm    Physical Exam  Vitals and nursing note reviewed.   Constitutional:       Appearance: Normal appearance.   HENT:      Head: Normocephalic.      Mouth/Throat:      Mouth: Mucous membranes are moist.      Pharynx: Oropharynx is clear.   Cardiovascular:      Rate and Rhythm: Normal rate and regular rhythm.      Heart sounds: Murmur heard.   Pulmonary:      Effort: Pulmonary effort is normal. No respiratory distress.      Breath sounds: Rales present. No wheezing.   Abdominal:      General: There is no distension.      Palpations: Abdomen is soft.      Tenderness: There is no abdominal tenderness. There is no guarding.   Musculoskeletal:         General: Normal range of motion.      Cervical back: Neck supple.      Right lower leg: Edema present.      Left lower leg: Edema present.   Skin:     General: Skin is warm and dry.    Neurological:      General: No focal deficit present.      Mental Status: She is alert and oriented to person, place, and time. Mental status is at baseline.   Psychiatric:         Mood and Affect: Mood normal.         Behavior: Behavior normal.         Judgment: Judgment normal.     Home Medications:   No current facility-administered medications on file prior to encounter.     Current Outpatient Medications on File Prior to Encounter   Medication Sig Dispense Refill    amLODIPine (NORVASC) 10 MG tablet Take 1 tablet (10 mg total) by mouth once daily. 30 tablet 0    aspirin (ECOTRIN) 325 MG EC tablet Take 1 tablet (325 mg total) by mouth once daily. 30 tablet 0    candesartan (ATACAND) 32 MG tablet Take 1 tablet (32 mg total) by mouth once daily. 30 tablet 0    ferrous sulfate (FEOSOL) 325 mg (65 mg iron) Tab tablet Take 1 tablet (325 mg total) by mouth daily with breakfast. 30 tablet 2    hydrALAZINE (APRESOLINE) 50 MG tablet Take 1 tablet (50 mg total) by mouth every 8 (eight) hours. 90 tablet 0    loratadine (CLARITIN) 10 mg tablet Take 1 tablet (10 mg total) by mouth once daily. 30 tablet 2    promethazine (PHENERGAN) 25 MG tablet Take 1 tablet (25 mg total) by mouth every 6 (six) hours as needed (nausea and vomiting). 28 tablet 2    spironolactone (ALDACTONE) 25 MG tablet Take 1 tablet (25 mg total) by mouth once daily. 30 tablet 0    sucralfate (CARAFATE) 1 gram tablet Take 1 tablet (1 g total) by mouth 4 (four) times daily before meals and nightly. 120 tablet 2    traMADoL (ULTRAM) 50 mg tablet Take 1 tablet (50 mg total) by mouth 3 (three) times daily. 90 tablet 2    vitamin D (VITAMIN D3) 1000 units Tab Take 2 tablets (2,000 Units total) by mouth once daily. 60 tablet 5    cimetidine (TAGAMET) 200 MG tablet Take 1 tablet (200 mg total) by mouth 4 (four) times daily. 120 tablet 0    erythromycin (ROMYCIN) ophthalmic ointment Place into both eyes 3 (three) times daily. for 5 days 3.5 g 0     naphazoline-pheniramine 0.025-0.3% (NAPHCON-A) 0.025-0.3 % ophthalmic solution Place 1 drop into both eyes 4 (four) times daily. for 4 days 5 mL 0     Current Inpatient Medications:    Current Facility-Administered Medications:     acetaminophen tablet 650 mg, 650 mg, Oral, Q8H PRN, Yana Molina PA-C    acetaminophen tablet 650 mg, 650 mg, Oral, Q4H PRN, Yana Molina PA-C    dextrose 10% bolus 125 mL 125 mL, 12.5 g, Intravenous, PRN, Karol Nevarez MD    dextrose 10% bolus 250 mL 250 mL, 25 g, Intravenous, PRN, Karol Nevarez MD    enoxaparin injection 40 mg, 40 mg, Subcutaneous, Daily, Yana Molina PA-C    glucagon (human recombinant) injection 1 mg, 1 mg, Intramuscular, PRN, Yana Molina PA-C    glucose chewable tablet 16 g, 16 g, Oral, PRN, Yana Molina PA-C    glucose chewable tablet 24 g, 24 g, Oral, PRN, Yana Molina PA-C    hydrALAZINE injection 10 mg, 10 mg, Intravenous, Q2H PRN, Yana Molina PA-C, 10 mg at 03/01/23 0840    nitroGLYCERIN SL tablet 0.4 mg, 0.4 mg, Sublingual, Q5 Min PRN, Marilee Bateman MD, 0.4 mg at 03/01/23 0755    ondansetron injection 4 mg, 4 mg, Intravenous, Q4H PRN, Yana Molina PA-C    sodium chloride 0.9% flush 10 mL, 10 mL, Intravenous, Q12H PRN, Yana Molina PA-C    Current Outpatient Medications:     amLODIPine (NORVASC) 10 MG tablet, Take 1 tablet (10 mg total) by mouth once daily., Disp: 30 tablet, Rfl: 0    aspirin (ECOTRIN) 325 MG EC tablet, Take 1 tablet (325 mg total) by mouth once daily., Disp: 30 tablet, Rfl: 0    candesartan (ATACAND) 32 MG tablet, Take 1 tablet (32 mg total) by mouth once daily., Disp: 30 tablet, Rfl: 0    ferrous sulfate (FEOSOL) 325 mg (65 mg iron) Tab tablet, Take 1 tablet (325 mg total) by mouth daily with breakfast., Disp: 30 tablet, Rfl: 2    hydrALAZINE (APRESOLINE) 50 MG tablet, Take 1 tablet (50 mg total) by mouth every 8 (eight) hours., Disp: 90 tablet, Rfl: 0    loratadine (CLARITIN) 10 mg  tablet, Take 1 tablet (10 mg total) by mouth once daily., Disp: 30 tablet, Rfl: 2    promethazine (PHENERGAN) 25 MG tablet, Take 1 tablet (25 mg total) by mouth every 6 (six) hours as needed (nausea and vomiting)., Disp: 28 tablet, Rfl: 2    spironolactone (ALDACTONE) 25 MG tablet, Take 1 tablet (25 mg total) by mouth once daily., Disp: 30 tablet, Rfl: 0    sucralfate (CARAFATE) 1 gram tablet, Take 1 tablet (1 g total) by mouth 4 (four) times daily before meals and nightly., Disp: 120 tablet, Rfl: 2    traMADoL (ULTRAM) 50 mg tablet, Take 1 tablet (50 mg total) by mouth 3 (three) times daily., Disp: 90 tablet, Rfl: 2    vitamin D (VITAMIN D3) 1000 units Tab, Take 2 tablets (2,000 Units total) by mouth once daily., Disp: 60 tablet, Rfl: 5    cimetidine (TAGAMET) 200 MG tablet, Take 1 tablet (200 mg total) by mouth 4 (four) times daily., Disp: 120 tablet, Rfl: 0    erythromycin (ROMYCIN) ophthalmic ointment, Place into both eyes 3 (three) times daily. for 5 days, Disp: 3.5 g, Rfl: 0    naphazoline-pheniramine 0.025-0.3% (NAPHCON-A) 0.025-0.3 % ophthalmic solution, Place 1 drop into both eyes 4 (four) times daily. for 4 days, Disp: 5 mL, Rfl: 0    VTE Risk Mitigation (From admission, onward)           Ordered     enoxaparin injection 40 mg  Daily         03/01/23 0759     IP VTE HIGH RISK PATIENT  Once         03/01/23 0759     Place sequential compression device  Until discontinued         03/01/23 0759                  Assessment:   Hypertensive Urgency    - Troponin Values Normal  Cardiac Murmur  History of Hypertension  GERD  Osteoarthritis  GERD  Allergic Rhinitis  Recent Bacterial Conjunctivitis  Elevated BMI    Plan:   Optimize Antihypertensive Agents, Transition to Procardia XL 60 mg Daily (DC Norvasc)  Lasix 40 Mg IVP q12h; Ensure Accurate I/0  Low Sodium Diet   Check Echocardiogram  IV Hydralazine PRN SBP > 170    Thank you for your consult.     Shannan Michelle, KAMILLEP  Cardiology  [facility] -  Emergency Dept  03/01/2023 9:43 AM     I have seen the patient, reviewed the Nurse Practitioner's note, assessment and plan. I have personally interviewed and examined the patient at bedside and agree with the findings. Medical decision making listed above were done under my guidance.

## 2023-03-02 LAB
ALBUMIN SERPL-MCNC: 3.5 G/DL (ref 3.4–4.8)
ALBUMIN/GLOB SERPL: 0.9 RATIO (ref 1.1–2)
ALP SERPL-CCNC: 87 UNIT/L (ref 40–150)
ALT SERPL-CCNC: 36 UNIT/L (ref 0–55)
AST SERPL-CCNC: 20 UNIT/L (ref 5–34)
BASOPHILS # BLD AUTO: 0.03 X10(3)/MCL (ref 0–0.2)
BASOPHILS NFR BLD AUTO: 0.4 %
BILIRUBIN DIRECT+TOT PNL SERPL-MCNC: 0.3 MG/DL
BUN SERPL-MCNC: 14.8 MG/DL (ref 9.8–20.1)
CALCIUM SERPL-MCNC: 9.4 MG/DL (ref 8.4–10.2)
CHLORIDE SERPL-SCNC: 99 MMOL/L (ref 98–107)
CO2 SERPL-SCNC: 27 MMOL/L (ref 23–31)
CREAT SERPL-MCNC: 1.22 MG/DL (ref 0.55–1.02)
EOSINOPHIL # BLD AUTO: 0.16 X10(3)/MCL (ref 0–0.9)
EOSINOPHIL NFR BLD AUTO: 2.3 %
ERYTHROCYTE [DISTWIDTH] IN BLOOD BY AUTOMATED COUNT: 18.4 % (ref 11.5–17)
GFR SERPLBLD CREATININE-BSD FMLA CKD-EPI: 46 MLS/MIN/1.73/M2
GLOBULIN SER-MCNC: 4 GM/DL (ref 2.4–3.5)
GLUCOSE SERPL-MCNC: 128 MG/DL (ref 82–115)
HCT VFR BLD AUTO: 39.5 % (ref 37–47)
HGB BLD-MCNC: 12.5 G/DL (ref 12–16)
IMM GRANULOCYTES # BLD AUTO: 0.01 X10(3)/MCL (ref 0–0.04)
IMM GRANULOCYTES NFR BLD AUTO: 0.1 %
LYMPHOCYTES # BLD AUTO: 1.94 X10(3)/MCL (ref 0.6–4.6)
LYMPHOCYTES NFR BLD AUTO: 28.5 %
MAGNESIUM SERPL-MCNC: 1.5 MG/DL (ref 1.6–2.6)
MCH RBC QN AUTO: 24.2 PG
MCHC RBC AUTO-ENTMCNC: 31.6 G/DL (ref 33–36)
MCV RBC AUTO: 76.4 FL (ref 80–94)
MONOCYTES # BLD AUTO: 0.68 X10(3)/MCL (ref 0.1–1.3)
MONOCYTES NFR BLD AUTO: 10 %
NEUTROPHILS # BLD AUTO: 3.99 X10(3)/MCL (ref 2.1–9.2)
NEUTROPHILS NFR BLD AUTO: 58.7 %
NRBC BLD AUTO-RTO: 0 %
PLATELET # BLD AUTO: 253 X10(3)/MCL (ref 130–400)
PMV BLD AUTO: 10.3 FL (ref 7.4–10.4)
POTASSIUM SERPL-SCNC: 3.8 MMOL/L (ref 3.5–5.1)
PROT SERPL-MCNC: 7.5 GM/DL (ref 5.8–7.6)
RBC # BLD AUTO: 5.17 X10(6)/MCL (ref 4.2–5.4)
SODIUM SERPL-SCNC: 136 MMOL/L (ref 136–145)
WBC # SPEC AUTO: 6.8 X10(3)/MCL (ref 4.5–11.5)

## 2023-03-02 PROCEDURE — 85025 COMPLETE CBC W/AUTO DIFF WBC: CPT | Performed by: PHYSICIAN ASSISTANT

## 2023-03-02 PROCEDURE — 25000003 PHARM REV CODE 250: Performed by: INTERNAL MEDICINE

## 2023-03-02 PROCEDURE — 80053 COMPREHEN METABOLIC PANEL: CPT | Performed by: PHYSICIAN ASSISTANT

## 2023-03-02 PROCEDURE — 63600175 PHARM REV CODE 636 W HCPCS: Performed by: NURSE PRACTITIONER

## 2023-03-02 PROCEDURE — 25000003 PHARM REV CODE 250: Performed by: PHYSICIAN ASSISTANT

## 2023-03-02 PROCEDURE — 25000003 PHARM REV CODE 250: Performed by: NURSE PRACTITIONER

## 2023-03-02 PROCEDURE — 21400001 HC TELEMETRY ROOM

## 2023-03-02 PROCEDURE — 63600175 PHARM REV CODE 636 W HCPCS: Performed by: INTERNAL MEDICINE

## 2023-03-02 PROCEDURE — 63600175 PHARM REV CODE 636 W HCPCS: Performed by: PHYSICIAN ASSISTANT

## 2023-03-02 PROCEDURE — 83735 ASSAY OF MAGNESIUM: CPT | Performed by: NURSE PRACTITIONER

## 2023-03-02 RX ORDER — BISACODYL 10 MG
10 SUPPOSITORY, RECTAL RECTAL DAILY PRN
Status: DISCONTINUED | OUTPATIENT
Start: 2023-03-02 | End: 2023-03-04 | Stop reason: HOSPADM

## 2023-03-02 RX ORDER — CLONIDINE HYDROCHLORIDE 0.2 MG/1
0.2 TABLET ORAL 3 TIMES DAILY
Status: DISCONTINUED | OUTPATIENT
Start: 2023-03-02 | End: 2023-03-04 | Stop reason: HOSPADM

## 2023-03-02 RX ORDER — NIFEDIPINE 90 MG/1
90 TABLET, EXTENDED RELEASE ORAL DAILY
Status: DISCONTINUED | OUTPATIENT
Start: 2023-03-03 | End: 2023-03-04 | Stop reason: HOSPADM

## 2023-03-02 RX ORDER — CARVEDILOL 3.12 MG/1
6.25 TABLET ORAL 2 TIMES DAILY
Status: DISCONTINUED | OUTPATIENT
Start: 2023-03-02 | End: 2023-03-03

## 2023-03-02 RX ORDER — MAGNESIUM SULFATE HEPTAHYDRATE 40 MG/ML
2 INJECTION, SOLUTION INTRAVENOUS ONCE
Status: COMPLETED | OUTPATIENT
Start: 2023-03-02 | End: 2023-03-02

## 2023-03-02 RX ADMIN — ERYTHROMYCIN: 5 OINTMENT OPHTHALMIC at 09:03

## 2023-03-02 RX ADMIN — MAGNESIUM SULFATE HEPTAHYDRATE 2 G: 40 INJECTION, SOLUTION INTRAVENOUS at 01:03

## 2023-03-02 RX ADMIN — NAPHAZOLINE HYDROCHLORIDE AND PHENIRAMINE MALEATE 1 DROP: .25; 3 SOLUTION/ DROPS OPHTHALMIC at 06:03

## 2023-03-02 RX ADMIN — CLONIDINE HYDROCHLORIDE 0.2 MG: 0.2 TABLET ORAL at 08:03

## 2023-03-02 RX ADMIN — HYDRALAZINE HYDROCHLORIDE 100 MG: 50 TABLET, FILM COATED ORAL at 10:03

## 2023-03-02 RX ADMIN — FERROUS SULFATE TAB 325 MG (65 MG ELEMENTAL FE) 1 EACH: 325 (65 FE) TAB at 09:03

## 2023-03-02 RX ADMIN — CLONIDINE HYDROCHLORIDE 0.2 MG: 0.2 TABLET ORAL at 06:03

## 2023-03-02 RX ADMIN — SUCRALFATE 1 G: 1 TABLET ORAL at 06:03

## 2023-03-02 RX ADMIN — POLYETHYLENE GLYCOL 3350 17 G: 17 POWDER, FOR SOLUTION ORAL at 08:03

## 2023-03-02 RX ADMIN — ERYTHROMYCIN: 5 OINTMENT OPHTHALMIC at 06:03

## 2023-03-02 RX ADMIN — BISACODYL 10 MG: 10 SUPPOSITORY RECTAL at 04:03

## 2023-03-02 RX ADMIN — NAPHAZOLINE HYDROCHLORIDE AND PHENIRAMINE MALEATE 1 DROP: .25; 3 SOLUTION/ DROPS OPHTHALMIC at 09:03

## 2023-03-02 RX ADMIN — SUCRALFATE 1 G: 1 TABLET ORAL at 12:03

## 2023-03-02 RX ADMIN — FUROSEMIDE 40 MG: 10 INJECTION, SOLUTION INTRAMUSCULAR; INTRAVENOUS at 03:03

## 2023-03-02 RX ADMIN — CHOLECALCIFEROL TAB 25 MCG (1000 UNIT) 2000 UNITS: 25 TAB at 09:03

## 2023-03-02 RX ADMIN — HYDRALAZINE HYDROCHLORIDE 100 MG: 50 TABLET, FILM COATED ORAL at 01:03

## 2023-03-02 RX ADMIN — CARVEDILOL 6.25 MG: 3.12 TABLET, FILM COATED ORAL at 12:03

## 2023-03-02 RX ADMIN — SUCRALFATE 1 G: 1 TABLET ORAL at 08:03

## 2023-03-02 RX ADMIN — SPIRONOLACTONE 25 MG: 25 TABLET ORAL at 09:03

## 2023-03-02 RX ADMIN — CARVEDILOL 6.25 MG: 3.12 TABLET, FILM COATED ORAL at 08:03

## 2023-03-02 RX ADMIN — ENOXAPARIN SODIUM 40 MG: 40 INJECTION SUBCUTANEOUS at 06:03

## 2023-03-02 RX ADMIN — SUCRALFATE 1 G: 1 TABLET ORAL at 04:03

## 2023-03-02 RX ADMIN — POLYETHYLENE GLYCOL 3350 17 G: 17 POWDER, FOR SOLUTION ORAL at 09:03

## 2023-03-02 RX ADMIN — LISINOPRIL 40 MG: 10 TABLET ORAL at 09:03

## 2023-03-02 RX ADMIN — HYDRALAZINE HYDROCHLORIDE 100 MG: 50 TABLET, FILM COATED ORAL at 04:03

## 2023-03-02 RX ADMIN — NAPHAZOLINE HYDROCHLORIDE AND PHENIRAMINE MALEATE 1 DROP: .25; 3 SOLUTION/ DROPS OPHTHALMIC at 01:03

## 2023-03-02 NOTE — NURSING
Nurses Note -- 4 Eyes      3/1/2023   10:04 PM      Skin assessed during: Admit      [x] No Pressure Injuries Present    []Prevention Measures Documented      [] Yes- Altered Skin Integrity Present or Discovered   [] LDA Added if Not in Epic (Describe Wound)   [] New Altered Skin Integrity was Present on Admit and Documented in LDA   [] Wound Image Taken    Wound Care Consulted? No    Attending Nurse:  Shahrzad Mayorga RN     Second RN/Staff Member:  Dary Chambers

## 2023-03-02 NOTE — PROGRESS NOTES
[facility]  Hospital Medicine Progress Note        Chief Complaint: Inpatient Follow-up for HTN    HPI:   Dayami Aleman is a 77 y.o. Black or  female with a past medical history of hypertension, GERD, lumbar degenerative disc disease and obstructive sleep apnea noncompliant with CPAP. The patient presented to [facility] on 2/28/2023 with a primary complaint of high blood pressure.  Patient received urgent care yesterday for conjunctivitis and upper respiratory infection. Blood pressure at urgent care was elevated at 192/74.  She checked her blood pressure at home after leaving urgent care in systolic pressure was in the 200s prompting her to present to the ED. Patient reports she is been having intermittent chest pain located to the center of the upper chest which began last night (02/28/2023.  She describes chest pain as an achiness with radiation to the left side, duration lasting a few minutes, with associated shortness a breath and currently 3/10 on exam.  She also complains of nasal congestion and eye irritation.  She denies complaints of headache, vision changes, diaphoresis, nausea and vomiting.     Upon presentation to the ED, temperature 98.4° F, heart rate 77, blood pressure 217/67, respiratory rate 21 and SpO2 98% on room air.  Labs with microcytic anemia which is stable, BNP 46, troponin 0.013 with repeat 0.010.  While in ED patient received full-dose aspirin, clonidine and hydralazine.  Cardiology consulted.  She is admitted to hospital medicine services and further medical management.    Interval Hx:   Patient today awake and comfortable. Denies any chest pain, SOB, fever or chills.   Family at bedside, explained in detail about the patients condition, diagnosis, vitals, labs and treatment plan. They understand and agree with the plan. All their questions were answered.      Objective/physical exam:  General: In no acute distress, Obese   Chest: Clear to  auscultation bilaterally  Heart: RRR, +S1, S2, no appreciable murmur  Abdomen: Soft, nontender, BS +  MSK: Warm, no lower extremity edema, no clubbing or cyanosis  Neurologic: Alert and oriented x4, Cranial nerve II-XII intact, Strength 5/5 in all 4 extremities    VITAL SIGNS: 24 HRS MIN & MAX LAST   Temp  Min: 98.2 °F (36.8 °C)  Max: 99.1 °F (37.3 °C) 99.1 °F (37.3 °C)   BP  Min: 146/72  Max: 208/74 (!) 193/74     Pulse  Min: 71  Max: 95  91   Resp  Min: 16  Max: 30 18   SpO2  Min: 92 %  Max: 99 % 99 %       Recent Labs   Lab 02/28/23  2220 03/02/23  1211   WBC 6.0 6.8   RBC 4.84 5.17   HGB 11.7* 12.5   HCT 37.3 39.5   MCV 77.1* 76.4*   MCH 24.2 24.2   MCHC 31.4* 31.6*   RDW 18.2* 18.4*    253   MPV 10.5* 10.3       Recent Labs   Lab 02/28/23  2220 03/01/23  1346 03/02/23  1211    140 136   K 4.0 3.9 3.8   CO2 28 28 27   BUN 10.7 10.4 14.8   CREATININE 0.97 0.93 1.22*   CALCIUM 9.5 9.6 9.4   MG  --   --  1.50*   ALBUMIN 3.5  --  3.5   ALKPHOS 100  --  87   ALT 33  --  36   AST 20  --  20   BILITOT 0.2  --  0.3          Microbiology Results (last 7 days)       ** No results found for the last 168 hours. **             See below for Radiology    Scheduled Med:   carvediloL  6.25 mg Oral BID    cloNIDine  0.2 mg Oral TID    enoxaparin  40 mg Subcutaneous Daily    erythromycin   Both Eyes TID    ferrous sulfate  1 tablet Oral Daily    hydrALAZINE  100 mg Oral Q8H    lisinopriL  40 mg Oral Daily    naphazoline-pheniramine 0.025-0.3%  1 drop Both Eyes QID    [START ON 3/3/2023] NIFEdipine  90 mg Oral Daily    polyethylene glycol  17 g Oral BID    spironolactone  25 mg Oral Daily    sucralfate  1 g Oral QID (AC & HS)    vitamin D  2,000 Units Oral Daily        Continuous Infusions:       PRN Meds:  acetaminophen, acetaminophen, bisacodyL, dextrose 10%, dextrose 10%, glucagon (human recombinant), glucose, glucose, hydrALAZINE, ondansetron, sodium chloride 0.9%, traMADoL       Assessment/Plan:  Hypertensive  urgency  Acute chest pain, non cardiac - resolved   Microcytic anemia  History of hypertension, GERD, obstructive sleep apnea on CPAP  Morbid obesity       Plan:  Patient today asymptomatic. But her BP is still high  Increased clonidine to 0.2 mg TID, will cont to titrate coreg and Nifedipine to keep SBP < 140  Advised life style modification   Will start PT   Troponin x 3 negative  She has risk factors and will f/u in [facility] clinic  Reviewed today's labs and  Hb 12, Plt 253,     Cont supportive care     Check renal artery US today       Critical care note:  Critical care diagnosis: Hypertensive urgency needing iv hydralazine   Critical care interventions: Hands-on evaluation, review of labs/radiographs/records and discussion with patient and family if present  Critical care time spent: 35 minutes       VTE prophylaxis: Lovenox     Patient condition:  Guarded    Anticipated discharge and Disposition:         All diagnosis and differential diagnosis have been reviewed; assessment and plan has been documented; I have personally reviewed the labs and test results that are presently available; I have reviewed the patients medication list; I have reviewed the consulting providers response and recommendations. I have reviewed or attempted to review medical records based upon their availability    All of the patient's questions have been  addressed and answered. Patient's is agreeable to the above stated plan. I will continue to monitor closely and make adjustments to medical management as needed.  _____________________________________________________________________    Nutrition Status:    Radiology:  US Renal Artery Stenosis Hyperten (xpd)  Narrative: EXAMINATION:  US RENAL ARTERY STENOSIS HYPERTEN (XPD)    CLINICAL HISTORY:  Uncontrolled HTN;    COMPARISON:  24 September 2021    FINDINGS:  Grayscale, color and spectral Doppler sonographic evaluation of the kidneys.    The right kidney measures 9.5 cm in length.  The left  kidney measures 9 cm in length.  Grossly normal renal parenchymal echogenicity.  There is no hydronephrosis.    Resistive indices in segmental renal arteries are mildly elevated.  No significantly elevated velocities in the visualized portions of the main renal arteries.  The renal veins are patent.  Impression: Mildly elevated resistive indices which could be seen with medical renal disease.  No other significant abnormality.    Electronically signed by: Mukesh Zafar  Date:    03/02/2023  Time:    12:37      Trung Perez MD   03/02/2023

## 2023-03-02 NOTE — PROGRESS NOTES
[facility] - 6th Floor Medical Telemetry  Cardiology  Progress Note    Patient Name: Dayami Aleman  MRN: [id number]  Admission Date: 2/28/2023  Hospital Length of Stay: 1 days  Code Status: Full Code   Attending Physician: Trung Perez MD   Primary Care Physician: Agata Chiu MD  Expected Discharge Date:   Principal Problem:<principal problem not specified>    Subjective:   Consultation Reason: Hypertensive Urgency/Shortness of Breath     HPI:   Ms. Aleman is a 77 year old female, unknown to [facility], who presented to the hospital with elevated blood pressure readings. Systolic readings greater than 200. Was recently treated for conjunctivitis at urgent care. Troponin values normal. Chest Radiograph revealed no acute disease. Labs are grossly stable. BNP is 46. Patient admitted to Hospital Medicine Service. [facility] consulted for blood pressure recommendations.    Hospital Course:   3.2.23: NAD Noted. Vitals Stable. Patient remains hypertensive, but somewhat improved. Clinically Stable.      PMH: Hypertension, Osteoarthritis, GERD, Allergic Rhinitis, Vitamin D Deficiency  PSH: Negative  Family History: Mother- Hypertension/Stroke, Father- Hypertension/Stroke, Brother- Stroke  Social History: Tobacco- Negative, Alcohol- Negative, Substance Abuse- Negative     Previous Cardiac Diagnostics:   Echocardiogram (3.1.23):  TDS due to poor acoustical windows/body habitus; patient bery uncooperative.  The estimated ejection fraction is 50-55%.  Low normal systolic function.  Indeterminate left ventricular diastolic function.  Normal right ventricular size.  Mild right atrial enlargement.  There is mild aortic valve stenosis.    Echocardiogram (8.19.21):  EF 55-60%    Review of Systems   Cardiovascular:  Negative for chest pain.   Respiratory:  Negative for shortness of breath.    All other systems reviewed and are negative.    Objective:     Vital Signs (Most Recent):  Temp: 98.6 °F (37 °C) (03/02/23  0742)  Pulse: 88 (03/02/23 0742)  Resp: 18 (03/02/23 0742)  BP: (!) 178/84 (03/02/23 0742)  SpO2: 95 % (03/02/23 0742)   Vital Signs (24h Range):  Temp:  [98.2 °F (36.8 °C)-98.9 °F (37.2 °C)] 98.6 °F (37 °C)  Pulse:  [71-95] 88  Resp:  [16-30] 18  SpO2:  [93 %-98 %] 95 %  BP: (146-231)/() 178/84     Weight: 113.4 kg (250 lb)  Body mass index is 42.91 kg/m².    SpO2: 95 %         Intake/Output Summary (Last 24 hours) at 3/2/2023 1112  Last data filed at 3/2/2023 0625  Gross per 24 hour   Intake 240 ml   Output 200 ml   Net 40 ml       Lines/Drains/Airways       Peripheral Intravenous Line  Duration                  Peripheral IV - Single Lumen 03/01/23 0550 18 G Right Antecubital 1 day                    Significant Labs:   Recent Results (from the past 72 hour(s))   Brain natriuretic peptide    Collection Time: 02/28/23 10:20 PM   Result Value Ref Range    Natriuretic Peptide 46.0 <=100.0 pg/mL   Comprehensive metabolic panel    Collection Time: 02/28/23 10:20 PM   Result Value Ref Range    Sodium Level 140 136 - 145 mmol/L    Potassium Level 4.0 3.5 - 5.1 mmol/L    Chloride 104 98 - 107 mmol/L    Carbon Dioxide 28 23 - 31 mmol/L    Glucose Level 123 (H) 82 - 115 mg/dL    Blood Urea Nitrogen 10.7 9.8 - 20.1 mg/dL    Creatinine 0.97 0.55 - 1.02 mg/dL    Calcium Level Total 9.5 8.4 - 10.2 mg/dL    Protein Total 7.1 5.8 - 7.6 gm/dL    Albumin Level 3.5 3.4 - 4.8 g/dL    Globulin 3.6 (H) 2.4 - 3.5 gm/dL    Albumin/Globulin Ratio 1.0 (L) 1.1 - 2.0 ratio    Bilirubin Total 0.2 <=1.5 mg/dL    Alkaline Phosphatase 100 40 - 150 unit/L    Alanine Aminotransferase 33 0 - 55 unit/L    Aspartate Aminotransferase 20 5 - 34 unit/L    eGFR 60 mls/min/1.73/m2   Troponin I    Collection Time: 02/28/23 10:20 PM   Result Value Ref Range    Troponin-I 0.013 0.000 - 0.045 ng/mL   CBC with Differential    Collection Time: 02/28/23 10:20 PM   Result Value Ref Range    WBC 6.0 4.5 - 11.5 x10(3)/mcL    RBC 4.84 4.20 - 5.40 x10(6)/mcL     Hgb 11.7 (L) 12.0 - 16.0 g/dL    Hct 37.3 37.0 - 47.0 %    MCV 77.1 (L) 80.0 - 94.0 fL    MCH 24.2 pg    MCHC 31.4 (L) 33.0 - 36.0 g/dL    RDW 18.2 (H) 11.5 - 17.0 %    Platelet 213 130 - 400 x10(3)/mcL    MPV 10.5 (H) 7.4 - 10.4 fL    Neut % 48.0 %    Lymph % 36.9 %    Mono % 12.1 %    Eos % 2.0 %    Basophil % 0.8 %    Lymph # 2.22 0.6 - 4.6 x10(3)/mcL    Neut # 2.88 2.1 - 9.2 x10(3)/mcL    Mono # 0.73 0.1 - 1.3 x10(3)/mcL    Eos # 0.12 0 - 0.9 x10(3)/mcL    Baso # 0.05 0 - 0.2 x10(3)/mcL    IG# 0.01 0 - 0.04 x10(3)/mcL    IG% 0.2 %    NRBC% 0.0 %   Troponin I    Collection Time: 03/01/23  4:08 AM   Result Value Ref Range    Troponin-I <0.010 0.000 - 0.045 ng/mL   Troponin I    Collection Time: 03/01/23  8:10 AM   Result Value Ref Range    Troponin-I <0.010 0.000 - 0.045 ng/mL   Echo    Collection Time: 03/01/23  9:59 AM   Result Value Ref Range    BSA 2.26 m2    EF 50 %    AV mean gradient 17 mmHg    Ao peak irving 2.8 m/s    AV peak gradient 31 mmHg   Troponin I    Collection Time: 03/01/23  1:46 PM   Result Value Ref Range    Troponin-I 0.028 0.000 - 0.045 ng/mL   Basic Metabolic Panel    Collection Time: 03/01/23  1:46 PM   Result Value Ref Range    Sodium Level 140 136 - 145 mmol/L    Potassium Level 3.9 3.5 - 5.1 mmol/L    Chloride 102 98 - 107 mmol/L    Carbon Dioxide 28 23 - 31 mmol/L    Glucose Level 111 82 - 115 mg/dL    Blood Urea Nitrogen 10.4 9.8 - 20.1 mg/dL    Creatinine 0.93 0.55 - 1.02 mg/dL    BUN/Creatinine Ratio 11     Calcium Level Total 9.6 8.4 - 10.2 mg/dL    Anion Gap 10.0 mEq/L    eGFR >60 mls/min/1.73/m2   Troponin I    Collection Time: 03/01/23  8:17 PM   Result Value Ref Range    Troponin-I 0.021 0.000 - 0.045 ng/mL     Telemetry:  Sinus Rhythm    Physical Exam  Vitals and nursing note reviewed.   Constitutional:       General: She is not in acute distress.     Appearance: She is obese. She is not ill-appearing.   HENT:      Head: Normocephalic.      Mouth/Throat:      Mouth: Mucous  membranes are moist.      Pharynx: Oropharynx is clear.   Cardiovascular:      Rate and Rhythm: Normal rate and regular rhythm.      Heart sounds: Murmur heard.   Pulmonary:      Effort: Pulmonary effort is normal. No respiratory distress.      Breath sounds: Normal breath sounds. No wheezing or rales.   Abdominal:      General: There is no distension.      Palpations: Abdomen is soft.      Tenderness: There is no abdominal tenderness. There is no guarding.   Musculoskeletal:         General: Normal range of motion.      Cervical back: Neck supple.      Right lower leg: No edema.      Left lower leg: No edema.   Skin:     General: Skin is warm and dry.   Neurological:      General: No focal deficit present.      Mental Status: She is alert and oriented to person, place, and time. Mental status is at baseline.   Psychiatric:         Mood and Affect: Mood normal.         Behavior: Behavior normal.         Judgment: Judgment normal.     Current Inpatient Medications:    Current Facility-Administered Medications:     acetaminophen tablet 650 mg, 650 mg, Oral, Q8H PRN, Yana Molina PA-C    acetaminophen tablet 650 mg, 650 mg, Oral, Q4H PRN, Yana Molina PA-C    bisacodyL suppository 10 mg, 10 mg, Rectal, Daily PRN, Brandee Archer, AGACNP-BC, 10 mg at 03/02/23 0418    carvediloL tablet 6.25 mg, 6.25 mg, Oral, BID, RASHEEDA Garcia    cloNIDine tablet 0.2 mg, 0.2 mg, Oral, TID, Trung Perez MD    dextrose 10% bolus 125 mL 125 mL, 12.5 g, Intravenous, PRN, Karol Nevarez MD    dextrose 10% bolus 250 mL 250 mL, 25 g, Intravenous, PRN, Karol Nevarez MD    enoxaparin injection 40 mg, 40 mg, Subcutaneous, Daily, Yana Molina PA-C, 40 mg at 03/01/23 1711    erythromycin 5 mg/gram (0.5 %) ophthalmic ointment, , Both Eyes, TID, Yana Molina PA-C, Given at 03/02/23 0934    ferrous sulfate tablet 1 each, 1 tablet, Oral, Daily, Yana Molina PA-C, 1 each at 03/02/23 0937    glucagon (human  recombinant) injection 1 mg, 1 mg, Intramuscular, PRN, Yana Molina PA-C    glucose chewable tablet 16 g, 16 g, Oral, PRN, Yana Molina PA-C    glucose chewable tablet 24 g, 24 g, Oral, PRN, Yana Molina PA-C    hydrALAZINE injection 10 mg, 10 mg, Intravenous, Q2H PRN, Shannan Michelle, FNP, 10 mg at 03/01/23 1711    hydrALAZINE tablet 100 mg, 100 mg, Oral, Q8H, Karol Nevarez MD, 100 mg at 03/02/23 0418    lisinopriL tablet 40 mg, 40 mg, Oral, Daily, Karol Nevarez MD, 40 mg at 03/02/23 0937    naphazoline-pheniramine 0.025-0.3% ophthalmic solution 1 drop, 1 drop, Both Eyes, QID, Karol Nevarez MD, 1 drop at 03/02/23 0934    [START ON 3/3/2023] NIFEdipine 24 hr tablet 90 mg, 90 mg, Oral, Daily, Trung Perez MD    ondansetron injection 4 mg, 4 mg, Intravenous, Q4H PRN, Yana Molina PA-C, 4 mg at 03/01/23 2227    polyethylene glycol packet 17 g, 17 g, Oral, BID, Karol Nevarez MD, 17 g at 03/02/23 0934    sodium chloride 0.9% flush 10 mL, 10 mL, Intravenous, Q12H PRN, Yana Molina PA-C    spironolactone tablet 25 mg, 25 mg, Oral, Daily, Yana Molina PA-C, 25 mg at 03/02/23 0937    sucralfate tablet 1 g, 1 g, Oral, QID (AC & HS), Yana Molina PA-C, 1 g at 03/02/23 0418    traMADoL tablet 50 mg, 50 mg, Oral, TID PRN, Karol Nevarez MD    vitamin D 1000 units tablet 2,000 Units, 2,000 Units, Oral, Daily, Yana Molina PA-C, 2,000 Units at 03/02/23 0937    VTE Risk Mitigation (From admission, onward)           Ordered     enoxaparin injection 40 mg  Daily         03/01/23 0759     IP VTE HIGH RISK PATIENT  Once         03/01/23 0759     Place sequential compression device  Until discontinued         03/01/23 0759                  Assessment:   Hypertensive Urgency- BP Improved but Remains Above Goal    - Troponin Values Normal x Multiple Sets    - History of Hypertension    - EF 50-55%  Valvular Heart Disease- Aortic Stenosis (Mild)  GERD  Osteoarthritis  GERD  Allergic  Rhinitis  Recent Bacterial Conjunctivitis  Elevated BMI    Plan:   Continue Current Antihypertensives. Start Coreg 6.25 Mg PO BID with Hold Parameters. (On Multiple Agents)  Renal Artery US Ordered by Primary Team  Plan [facility] Outpatient Re: SOB/CP with CAD Risk Factors  Will need outpatient Aortic Valve Surveillance  Home BP/HR Logs and bring to next clinic visit  Optimize Coreg as needed for Goal SBP < 140.   Follow up with [facility].  Will be available. Call if needed.    Shannan Michelle, RASHEEDA  Cardiology  [facility] - 6th Floor Medical Telemetry  03/02/2023     I have seen the patient, reviewed the Nurse Practitioner's note, assessment and plan. I have personally interviewed and examined the patient at bedside and agree with the findings. Medical decision making listed above were done under my guidance.

## 2023-03-03 PROCEDURE — 25000003 PHARM REV CODE 250: Performed by: PHYSICIAN ASSISTANT

## 2023-03-03 PROCEDURE — 63600175 PHARM REV CODE 636 W HCPCS: Performed by: PHYSICIAN ASSISTANT

## 2023-03-03 PROCEDURE — 25000003 PHARM REV CODE 250: Performed by: INTERNAL MEDICINE

## 2023-03-03 PROCEDURE — 25000003 PHARM REV CODE 250: Performed by: NURSE PRACTITIONER

## 2023-03-03 PROCEDURE — 97161 PT EVAL LOW COMPLEX 20 MIN: CPT

## 2023-03-03 PROCEDURE — 21400001 HC TELEMETRY ROOM

## 2023-03-03 RX ORDER — CARVEDILOL 12.5 MG/1
12.5 TABLET ORAL 2 TIMES DAILY
Status: DISCONTINUED | OUTPATIENT
Start: 2023-03-03 | End: 2023-03-04

## 2023-03-03 RX ADMIN — NAPHAZOLINE HYDROCHLORIDE AND PHENIRAMINE MALEATE 1 DROP: .25; 3 SOLUTION/ DROPS OPHTHALMIC at 09:03

## 2023-03-03 RX ADMIN — LISINOPRIL 40 MG: 10 TABLET ORAL at 09:03

## 2023-03-03 RX ADMIN — HYDRALAZINE HYDROCHLORIDE 100 MG: 50 TABLET, FILM COATED ORAL at 04:03

## 2023-03-03 RX ADMIN — HYDRALAZINE HYDROCHLORIDE 100 MG: 50 TABLET, FILM COATED ORAL at 05:03

## 2023-03-03 RX ADMIN — FERROUS SULFATE TAB 325 MG (65 MG ELEMENTAL FE) 1 EACH: 325 (65 FE) TAB at 09:03

## 2023-03-03 RX ADMIN — SPIRONOLACTONE 25 MG: 25 TABLET ORAL at 09:03

## 2023-03-03 RX ADMIN — HYDRALAZINE HYDROCHLORIDE 100 MG: 50 TABLET, FILM COATED ORAL at 09:03

## 2023-03-03 RX ADMIN — POLYETHYLENE GLYCOL 3350 17 G: 17 POWDER, FOR SOLUTION ORAL at 09:03

## 2023-03-03 RX ADMIN — ERYTHROMYCIN: 5 OINTMENT OPHTHALMIC at 09:03

## 2023-03-03 RX ADMIN — CARVEDILOL 6.25 MG: 3.12 TABLET, FILM COATED ORAL at 09:03

## 2023-03-03 RX ADMIN — BISACODYL 10 MG: 10 SUPPOSITORY RECTAL at 05:03

## 2023-03-03 RX ADMIN — CLONIDINE HYDROCHLORIDE 0.2 MG: 0.2 TABLET ORAL at 04:03

## 2023-03-03 RX ADMIN — CHOLECALCIFEROL TAB 25 MCG (1000 UNIT) 2000 UNITS: 25 TAB at 09:03

## 2023-03-03 RX ADMIN — CLONIDINE HYDROCHLORIDE 0.2 MG: 0.2 TABLET ORAL at 09:03

## 2023-03-03 RX ADMIN — ERYTHROMYCIN: 5 OINTMENT OPHTHALMIC at 04:03

## 2023-03-03 RX ADMIN — NIFEDIPINE 90 MG: 90 TABLET, FILM COATED, EXTENDED RELEASE ORAL at 10:03

## 2023-03-03 RX ADMIN — ENOXAPARIN SODIUM 40 MG: 40 INJECTION SUBCUTANEOUS at 04:03

## 2023-03-03 RX ADMIN — CARVEDILOL 12.5 MG: 12.5 TABLET, FILM COATED ORAL at 09:03

## 2023-03-03 NOTE — PROGRESS NOTES
[facility]  Hospital Medicine Progress Note        Chief Complaint: Inpatient Follow-up for HTN    HPI:   Dayami Aleman is a 77 y.o. Black or  female with a past medical history of hypertension, GERD, lumbar degenerative disc disease and obstructive sleep apnea noncompliant with CPAP. The patient presented to [facility] on 2/28/2023 with a primary complaint of high blood pressure.  Patient received urgent care yesterday for conjunctivitis and upper respiratory infection. Blood pressure at urgent care was elevated at 192/74.  She checked her blood pressure at home after leaving urgent care in systolic pressure was in the 200s prompting her to present to the ED. Patient reports she is been having intermittent chest pain located to the center of the upper chest which began last night (02/28/2023.  She describes chest pain as an achiness with radiation to the left side, duration lasting a few minutes, with associated shortness a breath and currently 3/10 on exam.  She also complains of nasal congestion and eye irritation.  She denies complaints of headache, vision changes, diaphoresis, nausea and vomiting.     Upon presentation to the ED, temperature 98.4° F, heart rate 77, blood pressure 217/67, respiratory rate 21 and SpO2 98% on room air.  Labs with microcytic anemia which is stable, BNP 46, troponin 0.013 with repeat 0.010.  While in ED patient received full-dose aspirin, clonidine and hydralazine.  Cardiology consulted.  She is admitted to hospital medicine services and further medical management.    Interval Hx:   Patient today awake and comfortable. Sitting up in the chair. No fever or chills or chest pain. Ambulating with PT.     Objective/physical exam:  General: In no acute distress, Obese   Chest: Clear to auscultation bilaterally  Heart: RRR, +S1, S2, no appreciable murmur  Abdomen: Soft, nontender, BS +  MSK: Warm, no lower extremity edema, no clubbing or  cyanosis  Neurologic: Alert and oriented x4, Cranial nerve II-XII intact, Strength 5/5 in all 4 extremities    VITAL SIGNS: 24 HRS MIN & MAX LAST   Temp  Min: 97.9 °F (36.6 °C)  Max: 99.4 °F (37.4 °C) 98.3 °F (36.8 °C)   BP  Min: 126/68  Max: 175/69 126/68     Pulse  Min: 56  Max: 87  (!) 56   Resp  Min: 18  Max: 20 18   SpO2  Min: 93 %  Max: 99 % 97 %       Recent Labs   Lab 02/28/23  2220 03/02/23  1211   WBC 6.0 6.8   RBC 4.84 5.17   HGB 11.7* 12.5   HCT 37.3 39.5   MCV 77.1* 76.4*   MCH 24.2 24.2   MCHC 31.4* 31.6*   RDW 18.2* 18.4*    253   MPV 10.5* 10.3       Recent Labs   Lab 02/28/23  2220 03/01/23  1346 03/02/23  1211    140 136   K 4.0 3.9 3.8   CO2 28 28 27   BUN 10.7 10.4 14.8   CREATININE 0.97 0.93 1.22*   CALCIUM 9.5 9.6 9.4   MG  --   --  1.50*   ALBUMIN 3.5  --  3.5   ALKPHOS 100  --  87   ALT 33  --  36   AST 20  --  20   BILITOT 0.2  --  0.3          Microbiology Results (last 7 days)       ** No results found for the last 168 hours. **             See below for Radiology    Scheduled Med:   carvediloL  12.5 mg Oral BID    cloNIDine  0.2 mg Oral TID    enoxaparin  40 mg Subcutaneous Daily    erythromycin   Both Eyes TID    ferrous sulfate  1 tablet Oral Daily    hydrALAZINE  100 mg Oral Q8H    lisinopriL  40 mg Oral Daily    naphazoline-pheniramine 0.025-0.3%  1 drop Both Eyes QID    NIFEdipine  90 mg Oral Daily    polyethylene glycol  17 g Oral BID    spironolactone  25 mg Oral Daily    sucralfate  1 g Oral QID (AC & HS)    vitamin D  2,000 Units Oral Daily        Continuous Infusions:       PRN Meds:  acetaminophen, acetaminophen, bisacodyL, dextrose 10%, dextrose 10%, glucagon (human recombinant), glucose, glucose, hydrALAZINE, ondansetron, sodium chloride 0.9%, traMADoL       Assessment/Plan:  Hypertensive urgency  Acute chest pain, non cardiac - resolved   Microcytic anemia  History of hypertension, GERD, obstructive sleep apnea on CPAP  Morbid obesity       Plan:  Patients BP  still > 160. She is ambulating with PT  Will need home PT for sure  Cont clonidine to 0.2 mg TID, will cont to titrate coreg and Nifedipine to keep SBP < 140  Us iv prn hydralazine for SBP >180  Advised life style modification   She has risk factors and will f/u in [facility] clinic    Cont supportive care     No renal artery stenosis per US       Critical care note:  Critical care diagnosis: Hypertensive urgency needing iv hydralazine   Critical care interventions: Hands-on evaluation, review of labs/radiographs/records and discussion with patient and family if present  Critical care time spent: 35 minutes       VTE prophylaxis: Lovenox     Patient condition:  Guarded    Anticipated discharge and Disposition:         All diagnosis and differential diagnosis have been reviewed; assessment and plan has been documented; I have personally reviewed the labs and test results that are presently available; I have reviewed the patients medication list; I have reviewed the consulting providers response and recommendations. I have reviewed or attempted to review medical records based upon their availability    All of the patient's questions have been  addressed and answered. Patient's is agreeable to the above stated plan. I will continue to monitor closely and make adjustments to medical management as needed.  _____________________________________________________________________    Nutrition Status:    Radiology:  US Renal Artery Stenosis Hyperten (xpd)  Narrative: EXAMINATION:  US RENAL ARTERY STENOSIS HYPERTEN (XPD)    CLINICAL HISTORY:  Uncontrolled HTN;    COMPARISON:  24 September 2021    FINDINGS:  Grayscale, color and spectral Doppler sonographic evaluation of the kidneys.    The right kidney measures 9.5 cm in length.  The left kidney measures 9 cm in length.  Grossly normal renal parenchymal echogenicity.  There is no hydronephrosis.    Resistive indices in segmental renal arteries are mildly elevated.  No significantly  elevated velocities in the visualized portions of the main renal arteries.  The renal veins are patent.  Impression: Mildly elevated resistive indices which could be seen with medical renal disease.  No other significant abnormality.    Electronically signed by: Mukesh Zafar  Date:    03/02/2023  Time:    12:37      Trung Perez MD   03/03/2023

## 2023-03-03 NOTE — PLAN OF CARE
03/03/23 1354   Discharge Reassessment   Assessment Type Discharge Planning Reassessment   Discharge Plan discussed with: Patient;Adult children   Discharge Plan A Home Health   Discharge Plan B Home Health   DME Needed Upon Discharge  none   Post-Acute Status   Post-Acute Authorization Home Health     Order for home health noted. List of providers given. FOC obtained for [facility]. Referral sent via [facility].

## 2023-03-03 NOTE — PT/OT/SLP EVAL
Physical Therapy Evaluation and Discharge Note    Patient Name:  Dayami Aleman   MRN:  [id number]    Recommendations:     Discharge Recommendations: home  Discharge Equipment Recommendations:     Barriers to discharge: None    Assessment:     Dayami Aleman is a 77 y.o. female admitted with a medical diagnosis of HTN emergency.  At this time, patient is functioning at their prior level of function and does not require further acute PT services.     Recent Surgery: * No surgery found *      Plan:     During this hospitalization, patient does not require further acute PT services.  Please re-consult if situation changes.      Subjective     Chief Complaint: none  Patient/Family Comments/goals: go home  Pain/Comfort:       Patients cultural, spiritual, Yarsani conflicts given the current situation: no    Living Environment:  Pt lives at home with daughter; daughter currently working out of state. Pt uses a cane to ambulate at baseline.  Upon discharge, patient will have assistance from unsure.    Objective:     Patient found up in chair upon PT entry to room.    General Precautions: Standard,      Respiratory Status: Room air    Exams:  RLE ROM: WFL  RLE Strength: WFL  LLE ROM: WFL  LLE Strength: WFL    Functional Mobility:  Transfers:     Sit to Stand:  modified independence with straight cane  Gait: Pt ambulated in room w/ cane, mod I. Pt demo'd slow adam, steady, no LOB      Patient left up in chair with all lines intact.    GOALS:   Multidisciplinary Problems       Physical Therapy Goals       Not on file                    History:     Past Medical History:   Diagnosis Date    GERD (gastroesophageal reflux disease)     Hypertension     Other seasonal allergic rhinitis     Unspecified osteoarthritis, unspecified site     Vitamin D deficiency        Past Surgical History:   Procedure Laterality Date    NO PAST SURGERIES         Time Tracking:     PT Received On: 03/03/23  PT Start Time: 1120     PT Stop  Time: 1135  PT Total Time (min): 15 min     Billable Minutes: Evaluation 15      03/03/2023

## 2023-03-04 VITALS
OXYGEN SATURATION: 100 % | HEART RATE: 70 BPM | TEMPERATURE: 99 F | DIASTOLIC BLOOD PRESSURE: 78 MMHG | SYSTOLIC BLOOD PRESSURE: 147 MMHG | HEIGHT: 64 IN | RESPIRATION RATE: 17 BRPM | BODY MASS INDEX: 42.68 KG/M2 | WEIGHT: 250 LBS

## 2023-03-04 LAB
ANION GAP SERPL CALC-SCNC: 10 MEQ/L
BUN SERPL-MCNC: 27.5 MG/DL (ref 9.8–20.1)
CALCIUM SERPL-MCNC: 8.6 MG/DL (ref 8.4–10.2)
CHLORIDE SERPL-SCNC: 97 MMOL/L (ref 98–107)
CO2 SERPL-SCNC: 26 MMOL/L (ref 23–31)
CREAT SERPL-MCNC: 1.53 MG/DL (ref 0.55–1.02)
CREAT/UREA NIT SERPL: 18
GFR SERPLBLD CREATININE-BSD FMLA CKD-EPI: 35 MLS/MIN/1.73/M2
GLUCOSE SERPL-MCNC: 109 MG/DL (ref 82–115)
POTASSIUM SERPL-SCNC: 3.9 MMOL/L (ref 3.5–5.1)
SODIUM SERPL-SCNC: 133 MMOL/L (ref 136–145)

## 2023-03-04 PROCEDURE — 80048 BASIC METABOLIC PNL TOTAL CA: CPT | Performed by: INTERNAL MEDICINE

## 2023-03-04 PROCEDURE — 25000003 PHARM REV CODE 250: Performed by: PHYSICIAN ASSISTANT

## 2023-03-04 PROCEDURE — 25000003 PHARM REV CODE 250: Performed by: INTERNAL MEDICINE

## 2023-03-04 RX ORDER — CARVEDILOL 25 MG/1
25 TABLET ORAL 2 TIMES DAILY
Qty: 60 TABLET | Refills: 11 | Status: SHIPPED | OUTPATIENT
Start: 2023-03-04 | End: 2023-05-22 | Stop reason: SDUPTHER

## 2023-03-04 RX ORDER — HYDRALAZINE HYDROCHLORIDE 100 MG/1
100 TABLET, FILM COATED ORAL EVERY 8 HOURS
Qty: 90 TABLET | Refills: 11 | Status: SHIPPED | OUTPATIENT
Start: 2023-03-04 | End: 2023-05-22 | Stop reason: SDUPTHER

## 2023-03-04 RX ORDER — CARVEDILOL 12.5 MG/1
25 TABLET ORAL 2 TIMES DAILY
Status: DISCONTINUED | OUTPATIENT
Start: 2023-03-04 | End: 2023-03-04 | Stop reason: HOSPADM

## 2023-03-04 RX ORDER — CLONIDINE HYDROCHLORIDE 0.2 MG/1
0.2 TABLET ORAL 3 TIMES DAILY
Qty: 90 TABLET | Refills: 11 | Status: SHIPPED | OUTPATIENT
Start: 2023-03-04 | End: 2023-05-22 | Stop reason: SDUPTHER

## 2023-03-04 RX ADMIN — CLONIDINE HYDROCHLORIDE 0.2 MG: 0.2 TABLET ORAL at 08:03

## 2023-03-04 RX ADMIN — CARVEDILOL 25 MG: 12.5 TABLET, FILM COATED ORAL at 08:03

## 2023-03-04 RX ADMIN — HYDRALAZINE HYDROCHLORIDE 100 MG: 50 TABLET, FILM COATED ORAL at 05:03

## 2023-03-04 RX ADMIN — CHOLECALCIFEROL TAB 25 MCG (1000 UNIT) 2000 UNITS: 25 TAB at 08:03

## 2023-03-04 RX ADMIN — FERROUS SULFATE TAB 325 MG (65 MG ELEMENTAL FE) 1 EACH: 325 (65 FE) TAB at 08:03

## 2023-03-04 RX ADMIN — LISINOPRIL 40 MG: 10 TABLET ORAL at 08:03

## 2023-03-04 RX ADMIN — NIFEDIPINE 90 MG: 90 TABLET, FILM COATED, EXTENDED RELEASE ORAL at 08:03

## 2023-03-04 NOTE — DISCHARGE SUMMARY
[facility] Hospital Medicine Discharge Summary    Admit Date: 2/28/2023  Discharge Date and Time: 3/4/886668:57 AM  Admitting Physician:  Team  Discharging Physician: Trung Perez MD.  Primary Care Physician: Agata Chiu MD  Consults: Cardiology    Discharge Diagnoses:  Hypertensive urgency  Acute chest pain, non cardiac - resolved   Microcytic anemia  History of hypertension, GERD, obstructive sleep apnea on CPAP  Morbid obesity        Hospital Course:   Dayami Aleman is a 77 y.o. Black or  female with a past medical history of hypertension, GERD, lumbar degenerative disc disease and obstructive sleep apnea noncompliant with CPAP. The patient presented to [facility] on 2/28/2023 with a primary complaint of high blood pressure.  Patient received urgent care yesterday for conjunctivitis and upper respiratory infection. Blood pressure at urgent care was elevated at 192/74.  She checked her blood pressure at home after leaving urgent care in systolic pressure was in the 200s prompting her to present to the ED. Patient reports she is been having intermittent chest pain located to the center of the upper chest which began last night (02/28/2023.  She describes chest pain as an achiness with radiation to the left side, duration lasting a few minutes, with associated shortness a breath and currently 3/10 on exam.  She also complains of nasal congestion and eye irritation.  She denies complaints of headache, vision changes, diaphoresis, nausea and vomiting.     Upon presentation to the ED, temperature 98.4° F, heart rate 77, blood pressure 217/67, respiratory rate 21 and SpO2 98% on room air.  Labs with microcytic anemia which is stable, BNP 46, troponin 0.013 with repeat 0.010.  While in ED patient received full-dose aspirin, clonidine and hydralazine.  Cardiology consulted.  She is admitted to hospital medicine services and further medical management.  Patient has  serial troponin done and was negative. Seen by [facility] team and recommended to adjust BP meds to control HTN. Her home meds was slowly adjusted and BP was controlled. She was stable and asymptomatic. ECHO done unremarkable. She was discharged home with .   Pt was seen and examined on the day of discharge  Vitals:  VITAL SIGNS: 24 HRS MIN & MAX LAST   Temp  Min: 97.9 °F (36.6 °C)  Max: 98.5 °F (36.9 °C) 98.5 °F (36.9 °C)   BP  Min: 135/77  Max: 178/67 (!) 147/78     Pulse  Min: 60  Max: 70  70   Resp  Min: 17  Max: 22 17   SpO2  Min: 96 %  Max: 100 % 100 %       Physical Exam:  Heart RRR  Lungs clear   Abdomen soft and non tender   Neuro: No FND    Morbidly obese    Procedures Performed: No admission procedures for hospital encounter.     Significant Diagnostic Studies: See Full reports for all details    Recent Labs   Lab 02/28/23 2220 03/02/23  1211   WBC 6.0 6.8   RBC 4.84 5.17   HGB 11.7* 12.5   HCT 37.3 39.5   MCV 77.1* 76.4*   MCH 24.2 24.2   MCHC 31.4* 31.6*   RDW 18.2* 18.4*    253   MPV 10.5* 10.3       Recent Labs   Lab 02/28/23  2220 03/01/23  1346 03/02/23  1211 03/04/23  0337    140 136 133*   K 4.0 3.9 3.8 3.9   CO2 28 28 27 26   BUN 10.7 10.4 14.8 27.5*   CREATININE 0.97 0.93 1.22* 1.53*   CALCIUM 9.5 9.6 9.4 8.6   MG  --   --  1.50*  --    ALBUMIN 3.5  --  3.5  --    ALKPHOS 100  --  87  --    ALT 33  --  36  --    AST 20  --  20  --    BILITOT 0.2  --  0.3  --         Microbiology Results (last 7 days)       ** No results found for the last 168 hours. **             US Renal Artery Stenosis Hyperten (xpd)  Narrative: EXAMINATION:  US RENAL ARTERY STENOSIS HYPERTEN (XPD)    CLINICAL HISTORY:  Uncontrolled HTN;    COMPARISON:  24 September 2021    FINDINGS:  Grayscale, color and spectral Doppler sonographic evaluation of the kidneys.    The right kidney measures 9.5 cm in length.  The left kidney measures 9 cm in length.  Grossly normal renal parenchymal echogenicity.  There is no  hydronephrosis.    Resistive indices in segmental renal arteries are mildly elevated.  No significantly elevated velocities in the visualized portions of the main renal arteries.  The renal veins are patent.  Impression: Mildly elevated resistive indices which could be seen with medical renal disease.  No other significant abnormality.    Electronically signed by: Mukesh Zafar  Date:    03/02/2023  Time:    12:37         Medication List        START taking these medications      carvediloL 25 MG tablet  Commonly known as: COREG  Take 1 tablet (25 mg total) by mouth 2 (two) times daily.     cloNIDine 0.2 MG tablet  Commonly known as: CATAPRES  Take 1 tablet (0.2 mg total) by mouth 3 (three) times daily.            CHANGE how you take these medications      hydrALAZINE 100 MG tablet  Commonly known as: APRESOLINE  Take 1 tablet (100 mg total) by mouth every 8 (eight) hours.  What changed:   medication strength  how much to take            CONTINUE taking these medications      amLODIPine 10 MG tablet  Commonly known as: NORVASC  Take 1 tablet (10 mg total) by mouth once daily.     aspirin 325 MG EC tablet  Commonly known as: ECOTRIN  Take 1 tablet (325 mg total) by mouth once daily.     candesartan 32 MG tablet  Commonly known as: ATACAND  Take 1 tablet (32 mg total) by mouth once daily.     cimetidine 200 MG tablet  Commonly known as: TAGAMET  Take 1 tablet (200 mg total) by mouth 4 (four) times daily.     ferrous sulfate 325 mg (65 mg iron) Tab tablet  Commonly known as: FEOSOL  Take 1 tablet (325 mg total) by mouth daily with breakfast.     naphazoline-pheniramine 0.025-0.3% 0.025-0.3 % ophthalmic solution  Commonly known as: NAPHCON-A  Place 1 drop into both eyes 4 (four) times daily. for 4 days     traMADoL 50 mg tablet  Commonly known as: ULTRAM  Take 1 tablet (50 mg total) by mouth 3 (three) times daily.     vitamin D 1000 units Tab  Commonly known as: VITAMIN D3  Take 2 tablets (2,000 Units total) by mouth  once daily.            STOP taking these medications      erythromycin ophthalmic ointment  Commonly known as: ROMYCIN     loratadine 10 mg tablet  Commonly known as: CLARITIN     promethazine 25 MG tablet  Commonly known as: PHENERGAN     spironolactone 25 MG tablet  Commonly known as: ALDACTONE     sucralfate 1 gram tablet  Commonly known as: CARAFATE               Where to Get Your Medications        These medications were sent to [facility]/pharmacy #[id number] - [facility] [address] [facility] [address] [facility]  [address] [place] [address]      Phone: [phone]   carvediloL 25 MG tablet  cloNIDine 0.2 MG tablet  hydrALAZINE 100 MG tablet          Explained in detail to the patient about the discharge plan, medications, and follow-up visits. Pt understands and agrees with the treatment plan  Discharge Disposition: [place] [facility]   Discharged Condition: stable  Diet-   Dietary Orders (From admission, onward)       Start     Ordered    03/01/23 0759  Diet heart healthy  (Diet/Nutrition [facility])  Diet effective now         03/01/23 0759                   Medications Per DC med rec  Activities as tolerated   Follow-up Information       [facility] Follow up.    Specialties: Home Health Services, Home Therapy Services, Home Living Aide Services  Why: This is your home health agency; someone will contact you within 24 hours of discharge.  Contact information:  [address] [facility] [address] [phone]             Agata Chiu MD Follow up in 1 week(s).    Specialty: Family Medicine  Why: Call office to schedule appointment in one week and labs (BMP) before visit.  Contact information:  [address] [facility] [address] [phone]                           For further questions contact hospitalist office    Discharge time 33 minutes    For worsening symptoms, chest pain, shortness of breath, increased abdominal pain, high grade fever, stroke or stroke like symptoms, immediately  go to the nearest Emergency Room or call 911 as soon as possible.      Trung Harden M.D, on 3/4/2023. at 11:57 AM.

## 2023-03-06 NOTE — PLAN OF CARE
03/06/23 1016   Final Note   Assessment Type Final Discharge Note   Anticipated Discharge Disposition Home-Health   Hospital Resources/Appts/Education Provided Appointments scheduled and added to AVS;Post-Acute resouces added to AVS   Post-Acute Status   Post-Acute Authorization Home Health     Home health was not notified at discharge. Spoke to Shabnam at [facility]. Discharge documentation sent to [facility] via [employer].

## 2023-03-15 ENCOUNTER — OFFICE VISIT (OUTPATIENT)
Dept: FAMILY MEDICINE | Facility: CLINIC | Age: 78
End: 2023-03-15
Payer: COMMERCIAL

## 2023-03-15 VITALS
TEMPERATURE: 98 F | WEIGHT: 252 LBS | OXYGEN SATURATION: 100 % | BODY MASS INDEX: 43.25 KG/M2 | RESPIRATION RATE: 18 BRPM | SYSTOLIC BLOOD PRESSURE: 148 MMHG | HEART RATE: 54 BPM | DIASTOLIC BLOOD PRESSURE: 60 MMHG

## 2023-03-15 DIAGNOSIS — J06.9 VIRAL UPPER RESPIRATORY TRACT INFECTION WITH COUGH: Primary | ICD-10-CM

## 2023-03-15 DIAGNOSIS — I10 PRIMARY HYPERTENSION: ICD-10-CM

## 2023-03-15 LAB
ANION GAP SERPL CALC-SCNC: 7 MEQ/L
BUN SERPL-MCNC: 15.6 MG/DL (ref 9.8–20.1)
CALCIUM SERPL-MCNC: 9.8 MG/DL (ref 8.4–10.2)
CHLORIDE SERPL-SCNC: 101 MMOL/L (ref 98–107)
CO2 SERPL-SCNC: 29 MMOL/L (ref 23–31)
CREAT SERPL-MCNC: 0.93 MG/DL (ref 0.55–1.02)
CREAT/UREA NIT SERPL: 17
GFR SERPLBLD CREATININE-BSD FMLA CKD-EPI: >60 MLS/MIN/1.73/M2
GLUCOSE SERPL-MCNC: 114 MG/DL (ref 82–115)
POTASSIUM SERPL-SCNC: 5.2 MMOL/L (ref 3.5–5.1)
SODIUM SERPL-SCNC: 137 MMOL/L (ref 136–145)

## 2023-03-15 PROCEDURE — 80048 BASIC METABOLIC PNL TOTAL CA: CPT

## 2023-03-15 PROCEDURE — 99214 OFFICE O/P EST MOD 30 MIN: CPT | Mod: PBBFAC

## 2023-03-15 PROCEDURE — 36415 COLL VENOUS BLD VENIPUNCTURE: CPT

## 2023-03-15 RX ORDER — ZINC GLUCONATE 13.3 MG
200 LOZENGE ORAL 4 TIMES DAILY
Qty: 120 TABLET | Refills: 0 | Status: SHIPPED | OUTPATIENT
Start: 2023-03-15 | End: 2023-05-22 | Stop reason: SDUPTHER

## 2023-03-15 RX ORDER — CANDESARTAN 32 MG/1
32 TABLET ORAL DAILY
Qty: 30 TABLET | Refills: 0 | Status: SHIPPED | OUTPATIENT
Start: 2023-03-15 | End: 2023-05-22 | Stop reason: SDUPTHER

## 2023-03-15 RX ORDER — ALBUTEROL SULFATE 90 UG/1
2 AEROSOL, METERED RESPIRATORY (INHALATION) EVERY 6 HOURS PRN
Qty: 18 G | Refills: 0 | Status: SHIPPED | OUTPATIENT
Start: 2023-03-15 | End: 2023-05-22 | Stop reason: SDUPTHER

## 2023-03-15 RX ORDER — AMLODIPINE BESYLATE 10 MG/1
10 TABLET ORAL DAILY
Qty: 30 TABLET | Refills: 0 | Status: SHIPPED | OUTPATIENT
Start: 2023-03-15 | End: 2023-05-22 | Stop reason: SDUPTHER

## 2023-03-15 RX ORDER — IPRATROPIUM BROMIDE 21 UG/1
2 SPRAY, METERED NASAL 3 TIMES DAILY
Qty: 30 ML | Refills: 0 | Status: SHIPPED | OUTPATIENT
Start: 2023-03-15 | End: 2023-05-22 | Stop reason: SDUPTHER

## 2023-03-15 NOTE — PROGRESS NOTES
I have reviewed the note from this visit, and I concur with the plan. Care provided was reasonable and necessary.   Services were provided in an outpatient department of a teaching hospital/facility, and I was immediately available.

## 2023-03-15 NOTE — PROGRESS NOTES
[facility] FM [facility] Progress Note    ID:  Dayami Aleman   MRN:  [id number]     3/15/2023    Chief Complaint:  Hospital follow up. Cough and congestion    History of Present Illness:  Dayami Aleman is a 77 y.o. female who presents to [facility] FM clinic for     Interval history:  Seen in the emergency department 02/23/2023 for significantly elevated blood pressure and chest pain with negative cardiac workup.  Medication regimen was adjusted.      Acute Issues:    Cough congestion x3 days productive of clear yellowish sputum.  She denies any fever, chills, nausea, vomiting, diarrhea, abdominal pain, shortness a breath, wheezing.  She has been using over-the-counter equate cough and cold medicine and lozenges without any significant relief.    Chronic Issues:    See below    Health Maintenance   Topic Date Due    DEXA Scan  05/16/2019    Lipid Panel  10/19/2023    TETANUS VACCINE  11/21/2029    Hepatitis C Screening  Completed       Past Medical History:   Diagnosis Date    GERD (gastroesophageal reflux disease)     Hypertension     Other seasonal allergic rhinitis     Unspecified osteoarthritis, unspecified site     Vitamin D deficiency        Past Surgical History:   Procedure Laterality Date    NO PAST SURGERIES         Social History     Tobacco Use    Smoking status: Never    Smokeless tobacco: Never   Substance Use Topics    Alcohol use: Not Currently    Drug use: Never       Family History   Problem Relation Age of Onset    Hypertension Mother     Stroke Mother     Hypertension Father     Stroke Father     Stroke Brother          Current Outpatient Medications:     albuterol (PROVENTIL HFA) 90 mcg/actuation inhaler, Inhale 2 puffs into the lungs every 6 (six) hours as needed for Wheezing. Rescue, Disp: 18 g, Rfl: 0    amLODIPine (NORVASC) 10 MG tablet, Take 1 tablet (10 mg total) by mouth once daily., Disp: 30 tablet, Rfl: 0    aspirin (ECOTRIN) 325 MG EC tablet, Take 1 tablet (325 mg total) by mouth once daily., Disp:  30 tablet, Rfl: 0    candesartan (ATACAND) 32 MG tablet, Take 1 tablet (32 mg total) by mouth once daily., Disp: 30 tablet, Rfl: 0    carvediloL (COREG) 25 MG tablet, Take 1 tablet (25 mg total) by mouth 2 (two) times daily., Disp: 60 tablet, Rfl: 11    cimetidine (TAGAMET) 200 MG tablet, Take 1 tablet (200 mg total) by mouth 4 (four) times daily., Disp: 120 tablet, Rfl: 0    cloNIDine (CATAPRES) 0.2 MG tablet, Take 1 tablet (0.2 mg total) by mouth 3 (three) times daily., Disp: 90 tablet, Rfl: 11    ferrous sulfate (FEOSOL) 325 mg (65 mg iron) Tab tablet, Take 1 tablet (325 mg total) by mouth daily with breakfast., Disp: 30 tablet, Rfl: 2    hydrALAZINE (APRESOLINE) 100 MG tablet, Take 1 tablet (100 mg total) by mouth every 8 (eight) hours., Disp: 90 tablet, Rfl: 11    ipratropium (ATROVENT) 21 mcg (0.03 %) nasal spray, 2 sprays by Each Nostril route 3 (three) times daily., Disp: 30 mL, Rfl: 0    traMADoL (ULTRAM) 50 mg tablet, Take 1 tablet (50 mg total) by mouth 3 (three) times daily., Disp: 90 tablet, Rfl: 2    vitamin D (VITAMIN D3) 1000 units Tab, Take 2 tablets (2,000 Units total) by mouth once daily., Disp: 60 tablet, Rfl: 5    Review of patient's allergies indicates:   Allergen Reactions    Sulfamethoxazole-trimethoprim Hives         Review of Systems:  See HPI      Physical Exam:  BP (!) 148/60 (BP Location: Left arm, Patient Position: Sitting, BP Method: Large (Manual))   Pulse (!) 54   Temp 98.4 °F (36.9 °C) (Oral)   Resp 18   Wt 114.3 kg (251 lb 15.8 oz)   SpO2 100%   BMI 43.25 kg/m²     Gen-well appearing, stated age  HEENT- mild oropharyngeal erythema, , conjunctival injection, nares patent without rhinorrhea.  Bilateral turbinates blanching.  No palpable cervical occipital lymphadenopathy.  CV -regular rate and rhythm, no lower extremity edema  Resp-speaking in full sentences, breathing nonlabored, lungs clear to auscultation bilaterally  GI- soft nontender, bowel sounds present  Neuro-alert,  responding appropriately to questions and commands  Skin-no wounds or rash      Assessment/Plan:    Hypertension  - at goal  - meds reviewed, continue current regimen  - medication refill sent to pharmacy upon patient request  - repeat BMP to show improvement from elevated reading hospital  - counseled on low-salt diet/dash diet  - continue ambulatory monitoring  - ED precautions discussed      URI with cough  - stable, improving   - counseling provided in size hydration, okay to take over-the-counter Mucinex, avoid anything with phenylephrine  - ipratropium nasal spray sent to patient pharmacy for cough    Follow up 1 month    Mason Jackson MD  [employer] FM Resident HO2

## 2023-03-21 NOTE — PROGRESS NOTES
Date of Service: 10/17/22  Attending Attestation: Patient discussed with resident. The chart was reviewed thoroughly including pertinent vitals, labs, imaging, prior notes, and consultant/specialist recommendations.  I participated in the management of the patient, examined the patient, reviewed the summary of the plan, and was immediately available at all times throughout the encounter. Services were furnished in a primary care center located in the outpatient department of a [facility] hospital. I agree with the resident's findings and plan as documented in the resident's note.    Renetta Whiting MD  Attending - Family Medicine / Geriatric Medicine  [facility], [facility]

## 2023-04-12 DIAGNOSIS — I10 PRIMARY HYPERTENSION: ICD-10-CM

## 2023-04-12 RX ORDER — CANDESARTAN 32 MG/1
32 TABLET ORAL DAILY
Qty: 30 TABLET | Refills: 2 | Status: CANCELLED | OUTPATIENT
Start: 2023-04-12 | End: 2023-05-12

## 2023-05-22 ENCOUNTER — OFFICE VISIT (OUTPATIENT)
Dept: FAMILY MEDICINE | Facility: CLINIC | Age: 78
End: 2023-05-22
Payer: COMMERCIAL

## 2023-05-22 VITALS
RESPIRATION RATE: 180 BRPM | OXYGEN SATURATION: 99 % | HEIGHT: 64 IN | TEMPERATURE: 99 F | WEIGHT: 247 LBS | SYSTOLIC BLOOD PRESSURE: 143 MMHG | HEART RATE: 56 BPM | DIASTOLIC BLOOD PRESSURE: 80 MMHG | BODY MASS INDEX: 42.17 KG/M2

## 2023-05-22 DIAGNOSIS — Z79.899 LONG-TERM USE OF HIGH-RISK MEDICATION: ICD-10-CM

## 2023-05-22 DIAGNOSIS — M19.90 OSTEOARTHRITIS, UNSPECIFIED OSTEOARTHRITIS TYPE, UNSPECIFIED SITE: ICD-10-CM

## 2023-05-22 DIAGNOSIS — M51.36 DEGENERATION OF INTERVERTEBRAL DISC OF LUMBAR REGION: ICD-10-CM

## 2023-05-22 DIAGNOSIS — E55.9 VITAMIN D DEFICIENCY: ICD-10-CM

## 2023-05-22 DIAGNOSIS — G47.33 OBSTRUCTIVE SLEEP APNEA SYNDROME: ICD-10-CM

## 2023-05-22 DIAGNOSIS — I10 PRIMARY HYPERTENSION: Primary | ICD-10-CM

## 2023-05-22 DIAGNOSIS — J06.9 VIRAL UPPER RESPIRATORY TRACT INFECTION WITH COUGH: ICD-10-CM

## 2023-05-22 LAB
AMPHET UR QL SCN: NEGATIVE
ANION GAP SERPL CALC-SCNC: 4 MEQ/L
BARBITURATE SCN PRESENT UR: NEGATIVE
BENZODIAZ UR QL SCN: NEGATIVE
BUN SERPL-MCNC: 10.9 MG/DL (ref 9.8–20.1)
CALCIUM SERPL-MCNC: 9.4 MG/DL (ref 8.4–10.2)
CANNABINOIDS UR QL SCN: NEGATIVE
CHLORIDE SERPL-SCNC: 102 MMOL/L (ref 98–107)
CO2 SERPL-SCNC: 34 MMOL/L (ref 23–31)
COCAINE UR QL SCN: NEGATIVE
CREAT SERPL-MCNC: 0.94 MG/DL (ref 0.55–1.02)
CREAT/UREA NIT SERPL: 12
FENTANYL UR QL SCN: NEGATIVE
GFR SERPLBLD CREATININE-BSD FMLA CKD-EPI: >60 MLS/MIN/1.73/M2
GLUCOSE SERPL-MCNC: 84 MG/DL (ref 82–115)
MDMA UR QL SCN: NEGATIVE
OPIATES UR QL SCN: NEGATIVE
PCP UR QL: NEGATIVE
PH UR: 5.5 [PH] (ref 3–11)
POTASSIUM SERPL-SCNC: 4.3 MMOL/L (ref 3.5–5.1)
SODIUM SERPL-SCNC: 140 MMOL/L (ref 136–145)

## 2023-05-22 PROCEDURE — 99214 OFFICE O/P EST MOD 30 MIN: CPT | Mod: PBBFAC

## 2023-05-22 PROCEDURE — 80307 DRUG TEST PRSMV CHEM ANLYZR: CPT

## 2023-05-22 PROCEDURE — 36415 COLL VENOUS BLD VENIPUNCTURE: CPT

## 2023-05-22 PROCEDURE — 80048 BASIC METABOLIC PNL TOTAL CA: CPT

## 2023-05-22 RX ORDER — SPIRONOLACTONE 25 MG/1
25 TABLET ORAL DAILY
Qty: 90 TABLET | Refills: 0 | Status: SHIPPED | OUTPATIENT
Start: 2023-05-22 | End: 2023-12-28 | Stop reason: SDUPTHER

## 2023-05-22 RX ORDER — CANDESARTAN 32 MG/1
32 TABLET ORAL DAILY
Qty: 30 TABLET | Refills: 0 | Status: SHIPPED | OUTPATIENT
Start: 2023-05-22 | End: 2023-12-28 | Stop reason: SDUPTHER

## 2023-05-22 RX ORDER — IPRATROPIUM BROMIDE 21 UG/1
2 SPRAY, METERED NASAL 3 TIMES DAILY
Qty: 30 ML | Refills: 0 | Status: SHIPPED | OUTPATIENT
Start: 2023-05-22

## 2023-05-22 RX ORDER — CARVEDILOL 25 MG/1
25 TABLET ORAL 2 TIMES DAILY
Qty: 60 TABLET | Refills: 11 | Status: SHIPPED | OUTPATIENT
Start: 2023-05-22 | End: 2023-08-31 | Stop reason: SDUPTHER

## 2023-05-22 RX ORDER — CHOLECALCIFEROL (VITAMIN D3) 25 MCG
2000 TABLET ORAL DAILY
Qty: 60 TABLET | Refills: 5 | Status: SHIPPED | OUTPATIENT
Start: 2023-05-22

## 2023-05-22 RX ORDER — TRAMADOL HYDROCHLORIDE 50 MG/1
50 TABLET ORAL EVERY 8 HOURS PRN
Qty: 90 TABLET | Refills: 0 | Status: SHIPPED | OUTPATIENT
Start: 2023-07-20 | End: 2023-08-31 | Stop reason: SDUPTHER

## 2023-05-22 RX ORDER — TRAMADOL HYDROCHLORIDE 50 MG/1
50 TABLET ORAL EVERY 8 HOURS PRN
Qty: 90 TABLET | Refills: 0 | Status: SHIPPED | OUTPATIENT
Start: 2023-05-22 | End: 2023-06-21

## 2023-05-22 RX ORDER — ZINC GLUCONATE 13.3 MG
200 LOZENGE ORAL 4 TIMES DAILY
Qty: 120 TABLET | Refills: 0 | Status: SHIPPED | OUTPATIENT
Start: 2023-05-22 | End: 2023-06-21

## 2023-05-22 RX ORDER — HYDRALAZINE HYDROCHLORIDE 100 MG/1
100 TABLET, FILM COATED ORAL EVERY 8 HOURS
Qty: 90 TABLET | Refills: 11 | Status: SHIPPED | OUTPATIENT
Start: 2023-05-22 | End: 2023-07-11 | Stop reason: SDUPTHER

## 2023-05-22 RX ORDER — CLONIDINE HYDROCHLORIDE 0.2 MG/1
0.2 TABLET ORAL 3 TIMES DAILY
Qty: 90 TABLET | Refills: 0 | Status: SHIPPED | OUTPATIENT
Start: 2023-05-22 | End: 2023-05-22 | Stop reason: SDUPTHER

## 2023-05-22 RX ORDER — CLONIDINE HYDROCHLORIDE 0.2 MG/1
0.2 TABLET ORAL 3 TIMES DAILY
Qty: 90 TABLET | Refills: 2 | Status: SHIPPED | OUTPATIENT
Start: 2023-05-22 | End: 2023-12-04 | Stop reason: SDUPTHER

## 2023-05-22 RX ORDER — SPIRONOLACTONE 25 MG/1
25 TABLET ORAL
COMMUNITY
Start: 2023-04-29 | End: 2023-05-22 | Stop reason: SDUPTHER

## 2023-05-22 RX ORDER — TRAMADOL HYDROCHLORIDE 50 MG/1
50 TABLET ORAL EVERY 8 HOURS PRN
Qty: 90 TABLET | Refills: 0 | Status: SHIPPED | OUTPATIENT
Start: 2023-06-21 | End: 2023-07-21

## 2023-05-22 RX ORDER — ALBUTEROL SULFATE 90 UG/1
2 AEROSOL, METERED RESPIRATORY (INHALATION) EVERY 6 HOURS PRN
Qty: 18 G | Refills: 0 | Status: SHIPPED | OUTPATIENT
Start: 2023-05-22

## 2023-05-22 RX ORDER — AMLODIPINE BESYLATE 10 MG/1
10 TABLET ORAL DAILY
Qty: 30 TABLET | Refills: 0 | Status: SHIPPED | OUTPATIENT
Start: 2023-05-22 | End: 2023-08-31 | Stop reason: SDUPTHER

## 2023-05-22 NOTE — PROGRESS NOTES
[facility] Family Medicine Clinic Note    Subjective:     Patient ID: Dayami Aleman is a 78 y.o. female    Chief Complaint:   Chief Complaint   Patient presents with    Follow-up    Medication Refill       HPI  78 yo F presents for routine follow up and medication refill    Acute Concerns:  No acute concerns or complaints today    Chronic Conditions:  Multilevel Lumbar Spine Degenerative Disease: Takes Tramadol 50mg TID with adequate results. Has been out of medication for about 2 months, since being in the hospital. Continues to have pain, has not been able to do daily activities as much as she wants due to the pain.     MAYANK: Sleeps with CPAP machine every night, Able to sleep through the night with the machine, only takes off to go to restroom and puts back on when she gets back in the bed. Has had this machine for 10-12 years and feels like she may need a new machine, does not recall the last time she has had the machine titrated, reports same setting since getting the machine.    HTN: Has been checking her BP at home, reports SBP 130s to 140s and DBP 60s to 70s. Has not had any elevated readings like what caused her to go to the ED when she was admitted in 02/2023. Denies chest pain, SOB, or vision changes.    GERD: Avoiding trigger foods. Continues with Cimetidine and Carafate as needed. Controlled at this time.      Controlled Substance Agreement  Diagnosis: Lumbar Disc Disease (M51.56)  Prescription: Tramadol 50mg TID  CSA Signed: 01/17/2023  UDS: Will need at next visit   reviewed: 05/22/2023  Dose reduction counseling: Yes  Concurrent Benzodiazepine: No  MME: 15  Functional assessment: Patient unable to perform ADLs without pain medication    Psychosocial History (yes/no)  Depression: No  Anxiety: No  Substance Abuse Disorder: No  Alcohol Abuse: No  Mental Illness: No  Opioid Use Disorder: No    Therapies/Interventions Optimized  Nonopioid medications: OTC analgesics  Exercise Therapy: HEP  Physical  "Therapy: Tried in the past      Review of Systems  As per HPI    Objective:     Blood pressure (!) 143/80, pulse (!) 56, temperature 98.6 °F (37 °C), temperature source Oral, resp. rate (!) 180, height 5' 4" (1.626 m), weight 112 kg (247 lb), SpO2 99 %.    Physical Exam    General: Well developed, no acute distress  CV: Regular rate rhythm, no edema, 2+ peripheral pulses  Resp: Non-labored breathing, symmetrical chest expansion bilaterally  Abd: soft, non-distended, non-tender to palpation, +BS, no organomegaly  MSK: Tenderness to palpation over lumbar spine and paraspinal musculature. No overlying skin changes. Patient ambulates with cane.       Assessment:     Problem List Items Addressed This Visit          Unprioritized    Degeneration of intervertebral disc of lumbar region    Relevant Medications    traMADoL (ULTRAM) 50 mg tablet    traMADoL (ULTRAM) 50 mg tablet (Start on 6/21/2023)    traMADoL (ULTRAM) 50 mg tablet (Start on 7/20/2023)    Hypertension - Primary    Relevant Medications    cloNIDine (CATAPRES) 0.2 MG tablet    amLODIPine (NORVASC) 10 MG tablet    candesartan (ATACAND) 32 MG tablet    Other Relevant Orders    Basic Metabolic Panel    Obstructive sleep apnea syndrome    Osteoarthritis    Relevant Medications    traMADoL (ULTRAM) 50 mg tablet    traMADoL (ULTRAM) 50 mg tablet (Start on 6/21/2023)    traMADoL (ULTRAM) 50 mg tablet (Start on 7/20/2023)    Vitamin D deficiency    Relevant Medications    vitamin D (VITAMIN D3) 1000 units Tab     Other Visit Diagnoses       Long-term use of high-risk medication        Relevant Orders    Drug Screen, Urine    Viral upper respiratory tract infection with cough        Relevant Medications    ipratropium (ATROVENT) 21 mcg (0.03 %) nasal spray              Plan:       Lumbar Disc Disease  Osteoarthritis  - Refilled Tramadol 50mg TID x3 months (05/22; 06/21; 07/20)  - UDS in office  - Continue with extra strength Tylenol for breakthrough pain    HTN  - " Refilled meds  - Advised patient to keep log of BP at home and bring to next visit  - BMP today as patient had elevated potassium at last visit    MAYANK  - Does not want to redo sleep study for renewed titration  - Advised patient of the benefits of appropriate CPAP setting on sleep and overall health including BP, patient will let us know when she wants to have updated sleep study for titration of CPAP setting    GERD  - Controlled, refilled medications    Healthcare Maintenance  - Declines any further DEXA screenings    RTC in 3 months for reevalaution    Aagta Chiu MD  [employer] FM  PGY-3

## 2023-07-11 RX ORDER — HYDRALAZINE HYDROCHLORIDE 100 MG/1
100 TABLET, FILM COATED ORAL EVERY 8 HOURS
Qty: 90 TABLET | Refills: 3 | Status: SHIPPED | OUTPATIENT
Start: 2023-07-11 | End: 2023-07-13 | Stop reason: SDUPTHER

## 2023-07-13 DIAGNOSIS — I10 HYPERTENSION, UNSPECIFIED TYPE: Primary | ICD-10-CM

## 2023-07-13 RX ORDER — HYDRALAZINE HYDROCHLORIDE 100 MG/1
100 TABLET, FILM COATED ORAL EVERY 8 HOURS
Qty: 90 TABLET | Refills: 3 | Status: SHIPPED | OUTPATIENT
Start: 2023-07-13 | End: 2023-08-31 | Stop reason: SDUPTHER

## 2023-07-13 NOTE — TELEPHONE ENCOUNTER
Hydralazine 100 mg ordered to [facility]/pharmacy #[id number] - [place], [place] - [address]  AT [facility]

## 2023-08-31 ENCOUNTER — OFFICE VISIT (OUTPATIENT)
Dept: FAMILY MEDICINE | Facility: CLINIC | Age: 78
End: 2023-08-31
Payer: COMMERCIAL

## 2023-08-31 VITALS
RESPIRATION RATE: 20 BRPM | WEIGHT: 237.63 LBS | SYSTOLIC BLOOD PRESSURE: 138 MMHG | HEART RATE: 67 BPM | DIASTOLIC BLOOD PRESSURE: 63 MMHG | HEIGHT: 64 IN | BODY MASS INDEX: 40.57 KG/M2 | TEMPERATURE: 100 F | OXYGEN SATURATION: 100 %

## 2023-08-31 DIAGNOSIS — E55.9 VITAMIN D DEFICIENCY: ICD-10-CM

## 2023-08-31 DIAGNOSIS — M19.90 OSTEOARTHRITIS, UNSPECIFIED OSTEOARTHRITIS TYPE, UNSPECIFIED SITE: ICD-10-CM

## 2023-08-31 DIAGNOSIS — M51.36 DEGENERATION OF INTERVERTEBRAL DISC OF LUMBAR REGION: Primary | ICD-10-CM

## 2023-08-31 DIAGNOSIS — I10 HYPERTENSION, UNSPECIFIED TYPE: ICD-10-CM

## 2023-08-31 DIAGNOSIS — I10 PRIMARY HYPERTENSION: ICD-10-CM

## 2023-08-31 PROCEDURE — 99214 OFFICE O/P EST MOD 30 MIN: CPT | Mod: PBBFAC

## 2023-08-31 RX ORDER — CARVEDILOL 25 MG/1
25 TABLET ORAL 2 TIMES DAILY
Qty: 60 TABLET | Refills: 11 | Status: SHIPPED | OUTPATIENT
Start: 2023-08-31 | End: 2023-12-04 | Stop reason: SDUPTHER

## 2023-08-31 RX ORDER — AMLODIPINE BESYLATE 10 MG/1
10 TABLET ORAL DAILY
Qty: 30 TABLET | Refills: 0 | Status: SHIPPED | OUTPATIENT
Start: 2023-08-31 | End: 2023-12-04 | Stop reason: SDUPTHER

## 2023-08-31 RX ORDER — HYDRALAZINE HYDROCHLORIDE 100 MG/1
100 TABLET, FILM COATED ORAL EVERY 8 HOURS
Qty: 90 TABLET | Refills: 3 | Status: SHIPPED | OUTPATIENT
Start: 2023-08-31 | End: 2023-12-04 | Stop reason: SDUPTHER

## 2023-08-31 RX ORDER — TRAMADOL HYDROCHLORIDE 50 MG/1
50 TABLET ORAL EVERY 8 HOURS PRN
Qty: 90 TABLET | Refills: 2 | Status: SHIPPED | OUTPATIENT
Start: 2023-08-31 | End: 2023-11-29

## 2023-08-31 NOTE — PROGRESS NOTES
[facility] FM Clinic Progress Note    ID:  Dayami Aleman   MRN:  [id number]     8/31/2023    Chief Complaint:    Chief Complaint   Patient presents with    Follow-up     3 month f/u states np complaints     History of Present Illness:  Dayami Aleman is a 78 y.o. female who presents to [facility] FM clinic for medication refill.     Acute Concerns:  No acute concerns or complaints today     Chronic Conditions:  Multilevel Lumbar Spine Degenerative Disease: Takes Tramadol 50mg TID with adequate results. Continues to have pain, but is improving from lat visit.     HTN: Has been checking her BP at home, reports SBP 130s to 140s and DBP 60s to 70s. Has not had any elevated readings like what caused her to go to the ED when she was admitted in 02/2023. Denies chest pain, SOB, or vision changes.     MAYANK: Sleeps with CPAP machine every night, Able to sleep through the night with the machine, only takes off to go to restroom and puts back on when she gets back in the bed.    Vitamin D deficiency: takes OTC Vitamin D, will recheck level in 3 months     GERD: Avoiding trigger foods. Continues with Cimetidine. Controlled at this time.     Controlled Substance Agreement  Diagnosis: Lumbar Disc Disease (M51.56)  Prescription: Tramadol 50mg TID  CSA Signed: 01/17/2023  UDS: Will need at next visit   reviewed: 08/31/2023  Dose reduction counseling: Yes  Concurrent Benzodiazepine: No  MME: 15  Functional assessment: Patient unable to perform ADLs without pain medication    Psychosocial History (yes/no)  Depression: No  Anxiety: No  Substance Abuse Disorder: No  Alcohol Abuse: No  Mental Illness: No  Opioid Use Disorder: No    Therapies/Interventions Optimized  Nonopioid medications: Tylenol  Exercise Therapy: HEP  Physical Therapy: Tried in the past    Medical History  Review of patient's allergies indicates:   Allergen Reactions    Sulfamethoxazole-trimethoprim Hives     Past Medical History:   Diagnosis Date    GERD (gastroesophageal  reflux disease)     Hypertension     Other seasonal allergic rhinitis     Unspecified osteoarthritis, unspecified site     Vitamin D deficiency      Social Hx:  reports that she has never smoked. She has never used smokeless tobacco. She reports that she does not currently use alcohol. She reports that she does not use drugs.  Social History     Tobacco Use    Smoking status: Never    Smokeless tobacco: Never   Substance Use Topics    Alcohol use: Not Currently    Drug use: Never     FH: family history includes Hypertension in her father and mother; Stroke in her brother, father, and mother.  Medication List with Changes/Refills   Current Medications    ALBUTEROL (PROVENTIL HFA) 90 MCG/ACTUATION INHALER    Inhale 2 puffs into the lungs every 6 (six) hours as needed for Wheezing. Rescue    ASPIRIN (ECOTRIN) 325 MG EC TABLET    Take 1 tablet (325 mg total) by mouth once daily.    CANDESARTAN (ATACAND) 32 MG TABLET    Take 1 tablet (32 mg total) by mouth once daily.    CIMETIDINE (TAGAMET) 200 MG TABLET    Take 1 tablet (200 mg total) by mouth 4 (four) times daily.    CLONIDINE (CATAPRES) 0.2 MG TABLET    Take 1 tablet (0.2 mg total) by mouth 3 (three) times daily.    IPRATROPIUM (ATROVENT) 21 MCG (0.03 %) NASAL SPRAY    2 sprays by Each Nostril route 3 (three) times daily.    SPIRONOLACTONE (ALDACTONE) 25 MG TABLET    Take 1 tablet (25 mg total) by mouth once daily.    VITAMIN D (VITAMIN D3) 1000 UNITS TAB    Take 2 tablets (2,000 Units total) by mouth once daily.   Changed and/or Refilled Medications    Modified Medication Previous Medication    AMLODIPINE (NORVASC) 10 MG TABLET amLODIPine (NORVASC) 10 MG tablet       Take 1 tablet (10 mg total) by mouth once daily.    Take 1 tablet (10 mg total) by mouth once daily.    CARVEDILOL (COREG) 25 MG TABLET carvediloL (COREG) 25 MG tablet       Take 1 tablet (25 mg total) by mouth 2 (two) times daily.    Take 1 tablet (25 mg total) by mouth 2 (two) times daily.     "HYDRALAZINE (APRESOLINE) 100 MG TABLET hydrALAZINE (APRESOLINE) 100 MG tablet       Take 1 tablet (100 mg total) by mouth every 8 (eight) hours.    Take 1 tablet (100 mg total) by mouth every 8 (eight) hours.    TRAMADOL (ULTRAM) 50 MG TABLET traMADoL (ULTRAM) 50 mg tablet       Take 1 tablet (50 mg total) by mouth every 8 (eight) hours as needed for Pain.    Take 1 tablet (50 mg total) by mouth every 8 (eight) hours as needed for Pain.       Review of Systems:  ROS reviewed with patient and updated below.  Review of Systems   Constitutional:  Negative for chills and fever.   Eyes:  Negative for double vision.   Respiratory:  Negative for cough.    Cardiovascular:  Negative for chest pain, palpitations and orthopnea.   Gastrointestinal:  Negative for abdominal pain, heartburn, nausea and vomiting.   Musculoskeletal:  Positive for back pain.   Neurological:  Negative for focal weakness.   Psychiatric/Behavioral:  Negative for substance abuse.       Pertinent positives and negatives as mentioned in HPI    Objective:  Vitals:    08/31/23 0917   BP: 138/63   BP Location: Right arm   Patient Position: Sitting   BP Method: Large (Automatic)   Pulse: 67   Resp: 20   Temp: 99.5 °F (37.5 °C)   TempSrc: Oral   SpO2: 100%   Weight: 107.8 kg (237 lb 9.6 oz)   Height: 5' 4" (1.626 m)        Physical Exam  Cardiovascular:      Rate and Rhythm: Normal rate and regular rhythm.      Pulses: Normal pulses.      Heart sounds: Normal heart sounds.   Pulmonary:      Effort: Pulmonary effort is normal.      Breath sounds: Normal breath sounds.   Abdominal:      General: Abdomen is flat.      Palpations: Abdomen is soft.   Neurological:      General: No focal deficit present.      Mental Status: She is alert.          Lab Results   Component Value Date     05/22/2023    K 4.3 05/22/2023    CO2 34 (H) 05/22/2023    BUN 10.9 05/22/2023    CREATININE 0.94 05/22/2023    EGFRIFAFRICA >60 09/24/2021    EGFRNONAA >60 09/24/2021    AST 20 " 03/02/2023    ALT 36 03/02/2023     CBC:  Lab Results   Component Value Date    WBC 6.8 03/02/2023    HGB 12.5 03/02/2023    HCT 39.5 03/02/2023    MCV 76.4 (L) 03/02/2023     03/02/2023     Lipid Panel:  Lab Results   Component Value Date    CHOL 180 10/19/2018    TRIG 137 10/19/2018    HDL 48 10/19/2018     10/19/2018     Diabetes:  Lab Results   Component Value Date    HGBA1C 5.9 10/17/2022    MICALBCREAT 8.8 10/17/2022     Thyroid:  Lab Results   Component Value Date    TSH 3.1500 10/17/2022       Assessment/Plan:  Dayami was seen today for follow-up and medication refill.    Diagnoses and all orders for this visit:    Degeneration of intervertebral disc of lumbar region  Osteoarthritis  -refilled traMADol 50 mg tablet tid for pain  -will use tylenol for breakthrough pain  - checked today, will order UDS at next visit    Hypertension  -Pt did not take BP medicine before coming to office, will bring home BP log at next visit  -recommended to continue and refilled BP medicine hydrALAZINE 100 MG tablet q8, carvediloL (COREG) 25 MG tablet BID, amLODIPine (NORVASC) 10 MG tablet daily  -continue home candesartan 32 mg daily  -recommended reducing dose of clonidine due to risk of orthostatic hypotension; however, pt would like to continue at current dose, will re-evalute at next visit     MAYANK  -uses CPAP nightly to sleep    Vitamin D deficiency  -takes otc Vitamin D, will recheck level in 3 month follow-up       Health Maintenance   Topic Date Due    DEXA Scan  05/16/2019    Lipid Panel  10/19/2023    TETANUS VACCINE  11/21/2029    Hepatitis C Screening  Completed    Shingles Vaccine  Completed       Follow up in about 3 months (around 11/30/2023).    Future Appointments   Date Time Provider Department Center   12/4/2023  9:00 AM Krzysztof Lamb MD [facility] Ángel Lamb MD  [facility], [facility]

## 2023-09-11 NOTE — PROGRESS NOTES
Faculty addendum: Patient discussed with resident. Chart was reviewed including vitals, labs, etc. Care provided reasonable and necessary. I participated in the management of the patient and was immediately available throughout the encounter. Services were furnished in a primary care center located in the outpatient department of a [facility] hospital. I agree with the resident's findings and plan as documented in the resident's note.

## 2023-12-04 ENCOUNTER — OFFICE VISIT (OUTPATIENT)
Dept: FAMILY MEDICINE | Facility: CLINIC | Age: 78
End: 2023-12-04
Payer: COMMERCIAL

## 2023-12-04 VITALS
WEIGHT: 227 LBS | OXYGEN SATURATION: 100 % | HEART RATE: 53 BPM | DIASTOLIC BLOOD PRESSURE: 54 MMHG | BODY MASS INDEX: 38.76 KG/M2 | HEIGHT: 64 IN | SYSTOLIC BLOOD PRESSURE: 141 MMHG | TEMPERATURE: 98 F

## 2023-12-04 DIAGNOSIS — Z23 IMMUNIZATION DUE: ICD-10-CM

## 2023-12-04 DIAGNOSIS — Z79.899 LONG-TERM USE OF HIGH-RISK MEDICATION: ICD-10-CM

## 2023-12-04 DIAGNOSIS — I10 PRIMARY HYPERTENSION: ICD-10-CM

## 2023-12-04 DIAGNOSIS — E55.9 VITAMIN D DEFICIENCY: Primary | ICD-10-CM

## 2023-12-04 DIAGNOSIS — I10 HYPERTENSION, UNSPECIFIED TYPE: ICD-10-CM

## 2023-12-04 LAB
CHOLEST SERPL-MCNC: 222 MG/DL
CHOLEST/HDLC SERPL: 4 {RATIO} (ref 0–5)
DEPRECATED CALCIDIOL+CALCIFEROL SERPL-MC: 76.6 NG/ML (ref 30–80)
EST. AVERAGE GLUCOSE BLD GHB EST-MCNC: 119.8 MG/DL
HBA1C MFR BLD: 5.8 %
HDLC SERPL-MCNC: 56 MG/DL (ref 35–60)
LDLC SERPL CALC-MCNC: 143 MG/DL (ref 50–140)
TRIGL SERPL-MCNC: 113 MG/DL (ref 37–140)
VLDLC SERPL CALC-MCNC: 23 MG/DL

## 2023-12-04 PROCEDURE — 82306 VITAMIN D 25 HYDROXY: CPT

## 2023-12-04 PROCEDURE — 36415 COLL VENOUS BLD VENIPUNCTURE: CPT

## 2023-12-04 PROCEDURE — 90694 VACC AIIV4 NO PRSRV 0.5ML IM: CPT | Mod: PBBFAC

## 2023-12-04 PROCEDURE — 83036 HEMOGLOBIN GLYCOSYLATED A1C: CPT

## 2023-12-04 PROCEDURE — 80061 LIPID PANEL: CPT

## 2023-12-04 PROCEDURE — G0008 ADMIN INFLUENZA VIRUS VAC: HCPCS | Mod: PBBFAC

## 2023-12-04 PROCEDURE — 99214 OFFICE O/P EST MOD 30 MIN: CPT | Mod: PBBFAC,25

## 2023-12-04 RX ORDER — AMLODIPINE BESYLATE 10 MG/1
10 TABLET ORAL DAILY
Qty: 30 TABLET | Refills: 0 | Status: SHIPPED | OUTPATIENT
Start: 2023-12-04 | End: 2023-12-28 | Stop reason: SDUPTHER

## 2023-12-04 RX ORDER — CARVEDILOL 25 MG/1
25 TABLET ORAL 2 TIMES DAILY
Qty: 60 TABLET | Refills: 11 | Status: SHIPPED | OUTPATIENT
Start: 2023-12-04 | End: 2024-02-29 | Stop reason: SDUPTHER

## 2023-12-04 RX ORDER — LORATADINE 10 MG/1
10 TABLET ORAL
COMMUNITY
Start: 2023-09-01 | End: 2024-02-29 | Stop reason: SDUPTHER

## 2023-12-04 RX ORDER — HYDRALAZINE HYDROCHLORIDE 100 MG/1
100 TABLET, FILM COATED ORAL EVERY 8 HOURS
Qty: 90 TABLET | Refills: 3 | Status: SHIPPED | OUTPATIENT
Start: 2023-12-04 | End: 2024-01-09 | Stop reason: SDUPTHER

## 2023-12-04 RX ORDER — CLONIDINE HYDROCHLORIDE 0.2 MG/1
0.2 TABLET ORAL 3 TIMES DAILY
Qty: 90 TABLET | Refills: 2 | Status: SHIPPED | OUTPATIENT
Start: 2023-12-04 | End: 2023-12-22 | Stop reason: SDUPTHER

## 2023-12-04 RX ADMIN — INFLUENZA A VIRUS A/VICTORIA/4897/2022 IVR-238 (H1N1) ANTIGEN (FORMALDEHYDE INACTIVATED), INFLUENZA A VIRUS A/DARWIN/6/2021 IVR-227 (H3N2) ANTIGEN (FORMALDEHYDE INACTIVATED), INFLUENZA B VIRUS B/AUSTRIA/1359417/2021 BVR-26 ANTIGEN (FORMALDEHYDE INACTIVATED), INFLUENZA B VIRUS B/PHUKET/3073/2013 BVR-1B ANTIGEN (FORMALDEHYDE INACTIVATED) 0.5 ML: 15; 15; 15; 15 INJECTION, SUSPENSION INTRAMUSCULAR at 10:12

## 2023-12-04 NOTE — PROGRESS NOTES
[facility] FM Clinic Progress Note    ID:  Dayami Aleman   MRN:  [id number]     12/4/2023    Chief Complaint:    Chief Complaint   Patient presents with    Follow-up    Hypertension       History of Present Illness:  Dayami Aleman is a 78 y.o. female who presents to [facility] FM clinic for follow up and HTN.    Acute Concerns:  No acute concerns or complaints today     Chronic Conditions:  Multilevel Lumbar Spine Degenerative Disease: Takes Tramadol 50mg TID with adequate results. Continues to have pain, but still gets out of the house as frequently as she can to play bingo.     MAYANK: Sleeps with CPAP machine every night, Able to sleep through the night with the machine, only takes off to go to restroom and puts back on when she gets back in the bed. Has had this machine for 10-12 years, reports same setting since getting the machine.     HTN: Has been checking her BP at home, reports SBP 140s to 150s and DBP 70s to 80s. Had reading in office with systolic in 180s on repeat BP was wnl (141/54). Did not take medicine this morning; is on quadruple therapy.  Denies chest pain, SOB, or vision changes.     GERD: Avoiding trigger foods. Continues with Cimetidine as needed. Controlled at this time.    Vit D Deficiency: taking Vit D daily, needs repeat Vit D level     Health Maintenance:  Due for Flu shot, DEXA scan, and labs    Review of Systems  As per HPI    Medical History  Review of patient's allergies indicates:   Allergen Reactions    Sulfamethoxazole-trimethoprim Hives     Past Medical History:   Diagnosis Date    GERD (gastroesophageal reflux disease)     Hypertension     Other seasonal allergic rhinitis     Unspecified osteoarthritis, unspecified site     Vitamin D deficiency      Social Hx:  reports that she has never smoked. She has never used smokeless tobacco. She reports that she does not currently use alcohol. She reports that she does not use drugs.  Social History     Tobacco Use    Smoking status: Never     Smokeless tobacco: Never   Substance Use Topics    Alcohol use: Not Currently    Drug use: Never     FH: family history includes Hypertension in her father and mother; Stroke in her brother, father, and mother.  Medication List with Changes/Refills   Current Medications    ALBUTEROL (PROVENTIL HFA) 90 MCG/ACTUATION INHALER    Inhale 2 puffs into the lungs every 6 (six) hours as needed for Wheezing. Rescue    ASPIRIN (ECOTRIN) 325 MG EC TABLET    Take 1 tablet (325 mg total) by mouth once daily.    CANDESARTAN (ATACAND) 32 MG TABLET    Take 1 tablet (32 mg total) by mouth once daily.    CIMETIDINE (TAGAMET) 200 MG TABLET    Take 1 tablet (200 mg total) by mouth 4 (four) times daily.    IPRATROPIUM (ATROVENT) 21 MCG (0.03 %) NASAL SPRAY    2 sprays by Each Nostril route 3 (three) times daily.    LORATADINE (CLARITIN) 10 MG TABLET    Take 10 mg by mouth.    SPIRONOLACTONE (ALDACTONE) 25 MG TABLET    Take 1 tablet (25 mg total) by mouth once daily.    VITAMIN D (VITAMIN D3) 1000 UNITS TAB    Take 2 tablets (2,000 Units total) by mouth once daily.   Changed and/or Refilled Medications    Modified Medication Previous Medication    AMLODIPINE (NORVASC) 10 MG TABLET amLODIPine (NORVASC) 10 MG tablet       Take 1 tablet (10 mg total) by mouth once daily.    Take 1 tablet (10 mg total) by mouth once daily.    CARVEDILOL (COREG) 25 MG TABLET carvediloL (COREG) 25 MG tablet       Take 1 tablet (25 mg total) by mouth 2 (two) times daily.    Take 1 tablet (25 mg total) by mouth 2 (two) times daily.    CLONIDINE (CATAPRES) 0.2 MG TABLET cloNIDine (CATAPRES) 0.2 MG tablet       Take 1 tablet (0.2 mg total) by mouth 3 (three) times daily.    Take 1 tablet (0.2 mg total) by mouth 3 (three) times daily.    HYDRALAZINE (APRESOLINE) 100 MG TABLET hydrALAZINE (APRESOLINE) 100 MG tablet       Take 1 tablet (100 mg total) by mouth every 8 (eight) hours.    Take 1 tablet (100 mg total) by mouth every 8 (eight) hours.  "        Objective:  Vitals:    12/04/23 0924 12/04/23 1048   BP: (!) 187/79 (!) 141/54   BP Location: Left arm Left arm   Patient Position: Sitting Sitting   BP Method: Large (Automatic) Large (Automatic)   Pulse: (!) 53    Temp: 98.4 °F (36.9 °C)    TempSrc: Oral    SpO2: 100%    Weight: 103 kg (227 lb)    Height: 5' 4" (1.626 m)         Physical Exam    General: no acute distress  CV: Regular rate rhythm, no murmurs, no edema, 2+ peripheral pulses  Resp: Non-labored breathing, symmetrical chest expansion bilaterally  Abd: soft, non-tender to palpation, +BS  MSK: no deformities, full ROM in all ext    Lab Results   Component Value Date     05/22/2023    K 4.3 05/22/2023    CO2 34 (H) 05/22/2023    BUN 10.9 05/22/2023    CREATININE 0.94 05/22/2023    EGFRIFAFRICA >60 09/24/2021    EGFRNONAA >60 09/24/2021    AST 20 03/02/2023    ALT 36 03/02/2023     CBC:  Lab Results   Component Value Date    WBC 6.8 03/02/2023    HGB 12.5 03/02/2023    HCT 39.5 03/02/2023    MCV 76.4 (L) 03/02/2023     03/02/2023     Lipid Panel:  Lab Results   Component Value Date    CHOL 222 (H) 12/04/2023    TRIG 113 12/04/2023    HDL 56 12/04/2023    .00 (H) 12/04/2023     Diabetes:  Lab Results   Component Value Date    HGBA1C 5.8 12/04/2023    MICALBCREAT 8.8 10/17/2022     Thyroid:  Lab Results   Component Value Date    TSH 3.1500 10/17/2022       Assessment/Plan:  Dayami was seen today for follow-up and hypertension.    Diagnoses and all orders for this visit:    Vitamin D deficiency  Health Maintenance  -     Ordered DXA Bone Density Axial Skeleton 1 or more sites; Future  -     Ordered Vitamin D  -     Ordered Hemoglobin A1C  -     Ordered Lipid Panel    Primary hypertension  -     Refilled amLODIPine (NORVASC) 10 MG tablet; Take 1 tablet (10 mg total) by mouth once daily.  -     Refilled cloNIDine (CATAPRES) 0.2 MG tablet; Take 1 tablet (0.2 mg total) by mouth 3 (three) times daily.  -     Refilled carvediloL (COREG) " 25 MG tablet; Take 1 tablet (25 mg total) by mouth 2 (two) times daily.  -     Refilled hydrALAZINE (APRESOLINE) 100 MG tablet; Take 1 tablet (100 mg total) by mouth every 8 (eight) hours.    Immunization due  -     influenza 65up-adj (QUADRIVALENT ADJUVANTED PF) vaccine 0.5 mL      Health Maintenance   Topic Date Due    DEXA Scan  05/16/2019    Lipid Panel  12/04/2028    TETANUS VACCINE  11/21/2029    Hepatitis C Screening  Completed    Shingles Vaccine  Completed       Follow up in about 3 months (around 3/4/2024).    Future Appointments   Date Time Provider Department Center   2/29/2024  8:40 AM Krzysztof Lamb MD [facility]     Krzysztof Lamb MD  [facility], [facility]

## 2023-12-07 NOTE — PROGRESS NOTES
I have seen the patient, reviewed the Resident's history and physical. I have personally interviewed and examined the patient at bedside and: agree with the findings.       Gen: obese BF in NAD  CV: RRR

## 2023-12-10 DIAGNOSIS — I10 PRIMARY HYPERTENSION: ICD-10-CM

## 2023-12-18 ENCOUNTER — TELEPHONE (OUTPATIENT)
Dept: FAMILY MEDICINE | Facility: CLINIC | Age: 78
End: 2023-12-18
Payer: COMMERCIAL

## 2023-12-18 DIAGNOSIS — M51.36 DEGENERATION OF INTERVERTEBRAL DISC OF LUMBAR REGION: Primary | ICD-10-CM

## 2023-12-18 RX ORDER — TRAMADOL HYDROCHLORIDE 50 MG/1
50 TABLET ORAL 3 TIMES DAILY
Qty: 90 TABLET | Refills: 0 | Status: SHIPPED | OUTPATIENT
Start: 2023-12-18 | End: 2023-12-19 | Stop reason: SDUPTHER

## 2023-12-18 NOTE — PROGRESS NOTES
Pt has extensive multilevel lumbar spine degenerative disease . Refilled Tramadol 50mg TID for 30 days.

## 2023-12-19 DIAGNOSIS — M51.36 DEGENERATION OF INTERVERTEBRAL DISC OF LUMBAR REGION: ICD-10-CM

## 2023-12-19 RX ORDER — TRAMADOL HYDROCHLORIDE 50 MG/1
50 TABLET ORAL 3 TIMES DAILY
Qty: 90 TABLET | Refills: 0 | Status: SHIPPED | OUTPATIENT
Start: 2023-12-19 | End: 2024-01-18

## 2023-12-21 ENCOUNTER — TELEPHONE (OUTPATIENT)
Dept: FAMILY MEDICINE | Facility: CLINIC | Age: 78
End: 2023-12-21
Payer: COMMERCIAL

## 2023-12-22 DIAGNOSIS — I10 PRIMARY HYPERTENSION: ICD-10-CM

## 2023-12-22 RX ORDER — CLONIDINE HYDROCHLORIDE 0.2 MG/1
0.2 TABLET ORAL 3 TIMES DAILY
Qty: 90 TABLET | Refills: 2 | OUTPATIENT
Start: 2023-12-22

## 2023-12-22 RX ORDER — CLONIDINE HYDROCHLORIDE 0.2 MG/1
0.2 TABLET ORAL 3 TIMES DAILY
Qty: 90 TABLET | Refills: 2 | Status: SHIPPED | OUTPATIENT
Start: 2023-12-22 | End: 2024-02-29 | Stop reason: SDUPTHER

## 2023-12-22 NOTE — TELEPHONE ENCOUNTER
Pt states she has not received her mail order Rx from 12/4/23 and is out completely and BP is up. Stated last BP reading was 200/100. Advised pt that she should go to ED if BP remains high. Pt stated she just wants Rx sent to [facility] until mail order comes in and that she can take that and BP will go down. Advised of ED precautions. Pt verbalized understanding.

## 2023-12-28 DIAGNOSIS — I10 PRIMARY HYPERTENSION: ICD-10-CM

## 2023-12-29 RX ORDER — SPIRONOLACTONE 25 MG/1
25 TABLET ORAL DAILY
Qty: 90 TABLET | Refills: 0 | Status: SHIPPED | OUTPATIENT
Start: 2023-12-29 | End: 2024-03-28

## 2023-12-29 RX ORDER — CANDESARTAN 32 MG/1
32 TABLET ORAL DAILY
Qty: 30 TABLET | Refills: 0 | Status: SHIPPED | OUTPATIENT
Start: 2023-12-29 | End: 2024-02-29 | Stop reason: SDUPTHER

## 2023-12-29 RX ORDER — AMLODIPINE BESYLATE 10 MG/1
10 TABLET ORAL DAILY
Qty: 30 TABLET | Refills: 0 | Status: SHIPPED | OUTPATIENT
Start: 2023-12-29 | End: 2024-02-29 | Stop reason: SDUPTHER

## 2024-01-09 DIAGNOSIS — I10 PRIMARY HYPERTENSION: ICD-10-CM

## 2024-01-09 DIAGNOSIS — I10 HYPERTENSION, UNSPECIFIED TYPE: ICD-10-CM

## 2024-01-09 RX ORDER — HYDRALAZINE HYDROCHLORIDE 100 MG/1
100 TABLET, FILM COATED ORAL EVERY 8 HOURS
Qty: 90 TABLET | Refills: 3 | Status: SHIPPED | OUTPATIENT
Start: 2024-01-09

## 2024-02-29 ENCOUNTER — OFFICE VISIT (OUTPATIENT)
Dept: FAMILY MEDICINE | Facility: CLINIC | Age: 79
End: 2024-02-29
Payer: MEDICARE

## 2024-02-29 VITALS
SYSTOLIC BLOOD PRESSURE: 190 MMHG | HEART RATE: 54 BPM | TEMPERATURE: 98 F | WEIGHT: 220 LBS | OXYGEN SATURATION: 98 % | BODY MASS INDEX: 37.56 KG/M2 | HEIGHT: 64 IN | DIASTOLIC BLOOD PRESSURE: 88 MMHG | RESPIRATION RATE: 18 BRPM

## 2024-02-29 DIAGNOSIS — I10 HYPERTENSION, UNSPECIFIED TYPE: ICD-10-CM

## 2024-02-29 DIAGNOSIS — I10 PRIMARY HYPERTENSION: ICD-10-CM

## 2024-02-29 DIAGNOSIS — M51.36 DEGENERATION OF INTERVERTEBRAL DISC OF LUMBAR REGION: Primary | ICD-10-CM

## 2024-02-29 DIAGNOSIS — E55.9 VITAMIN D DEFICIENCY: ICD-10-CM

## 2024-02-29 DIAGNOSIS — J30.1 SEASONAL ALLERGIC RHINITIS DUE TO POLLEN: ICD-10-CM

## 2024-02-29 PROCEDURE — 99215 OFFICE O/P EST HI 40 MIN: CPT | Mod: PBBFAC

## 2024-02-29 RX ORDER — CARVEDILOL 25 MG/1
25 TABLET ORAL 2 TIMES DAILY
Qty: 60 TABLET | Refills: 2 | Status: SHIPPED | OUTPATIENT
Start: 2024-02-29 | End: 2024-04-16

## 2024-02-29 RX ORDER — CLONIDINE HYDROCHLORIDE 0.2 MG/1
0.2 TABLET ORAL 3 TIMES DAILY
Qty: 90 TABLET | Refills: 2 | Status: SHIPPED | OUTPATIENT
Start: 2024-02-29 | End: 2024-04-16 | Stop reason: SDUPTHER

## 2024-02-29 RX ORDER — TRAMADOL HYDROCHLORIDE 50 MG/1
50 TABLET ORAL 3 TIMES DAILY
Qty: 90 TABLET | Refills: 0 | Status: SHIPPED | OUTPATIENT
Start: 2024-02-29 | End: 2024-05-29

## 2024-02-29 RX ORDER — TRAMADOL HYDROCHLORIDE 50 MG/1
50 TABLET ORAL EVERY 6 HOURS PRN
Qty: 90 TABLET | Refills: 0 | Status: SHIPPED | OUTPATIENT
Start: 2024-04-29 | End: 2024-05-29 | Stop reason: SDUPTHER

## 2024-02-29 RX ORDER — TRAMADOL HYDROCHLORIDE 50 MG/1
50 TABLET ORAL 3 TIMES DAILY
Qty: 90 TABLET | Refills: 0 | Status: SHIPPED | OUTPATIENT
Start: 2024-03-29 | End: 2024-04-28

## 2024-02-29 RX ORDER — TRAMADOL HYDROCHLORIDE 50 MG/1
50 TABLET ORAL 3 TIMES DAILY
COMMUNITY
Start: 2024-01-19 | End: 2024-02-29 | Stop reason: SDUPTHER

## 2024-02-29 RX ORDER — LORATADINE 10 MG/1
10 TABLET ORAL DAILY
Qty: 30 TABLET | Refills: 2 | Status: SHIPPED | OUTPATIENT
Start: 2024-02-29 | End: 2024-04-16 | Stop reason: SDUPTHER

## 2024-02-29 RX ORDER — AMLODIPINE BESYLATE 10 MG/1
10 TABLET ORAL DAILY
Qty: 30 TABLET | Refills: 2 | Status: SHIPPED | OUTPATIENT
Start: 2024-02-29 | End: 2024-04-16 | Stop reason: SDUPTHER

## 2024-02-29 RX ORDER — CANDESARTAN 32 MG/1
32 TABLET ORAL DAILY
Qty: 30 TABLET | Refills: 2 | Status: SHIPPED | OUTPATIENT
Start: 2024-02-29 | End: 2024-04-16 | Stop reason: SDUPTHER

## 2024-02-29 NOTE — PROGRESS NOTES
[facility] FM Clinic Progress Note    ID:  Dayami Aleman   MRN:  [id number]     2/29/2024    Chief Complaint:    Chief Complaint   Patient presents with    Medication Refill     All medications     Follow-up     3 month follow up     History of Present Illness:  Dayami Aleman is a 78 y.o. female who presents to [facility] FM clinic for follow up for her HTN and degenerative disease.      Acute Concerns:  Pt aggravated because she has been out of her blood pressure medicine and tramadol for one week. Pt states she called office multiple times to let us know that she was out of her medication. PCP was not notified by clinic.      Chronic Conditions:  Multilevel Lumbar Spine Degenerative Disease: Takes Tramadol 50mg TID with adequate results. Continues to have pain, but still gets out of the house as frequently as she can to play bingo. Pain has gotten worse since running out of medicine, rates pain as 8/10 today.      MAYANK: Sleeps with CPAP machine every night, Able to sleep through the night with the machine, only takes off to go to restroom and puts back on when she gets back in the bed. Has had this machine for 10-12 years, reports same setting since getting the machine.     HTN: Has been checking her BP at home, reports SBP 140s to 150s and DBP 70s to 80s. BP elevated in office today (190/88) which is consistent with patient being out of blood pressure medicine for one week. Currently on quadruple therapy; coreg, clonidine, amlodipine, and candesartan.  Denies chest pain, SOB, or vision changes.     GERD: Avoiding trigger foods. Continues with Cimetidine as needed. Controlled at this time.     Vit D Deficiency: taking Vit D daily, vit d levels wnl at last check in 12/23.      Health Maintenance:  Ordered DXA at last visit, not completed, will reiterate need today     Review of Systems  As per HPI    Medical History  Review of patient's allergies indicates:   Allergen Reactions    Sulfamethoxazole-trimethoprim Hives      Past Medical History:   Diagnosis Date    GERD (gastroesophageal reflux disease)     Hypertension     Other seasonal allergic rhinitis     Unspecified osteoarthritis, unspecified site     Vitamin D deficiency      Social Hx:  reports that she has never smoked. She has never used smokeless tobacco. She reports that she does not currently use alcohol. She reports that she does not use drugs.  Social History     Tobacco Use    Smoking status: Never    Smokeless tobacco: Never   Substance Use Topics    Alcohol use: Not Currently    Drug use: Never     FH: family history includes Hypertension in her father and mother; Stroke in her brother, father, and mother.  Medication List with Changes/Refills   New Medications    TRAMADOL (ULTRAM) 50 MG TABLET    Take 1 tablet (50 mg total) by mouth 3 (three) times daily.    TRAMADOL (ULTRAM) 50 MG TABLET    Take 1 tablet (50 mg total) by mouth every 6 (six) hours as needed for Pain.   Current Medications    ALBUTEROL (PROVENTIL HFA) 90 MCG/ACTUATION INHALER    Inhale 2 puffs into the lungs every 6 (six) hours as needed for Wheezing. Rescue    ASPIRIN (ECOTRIN) 325 MG EC TABLET    Take 1 tablet (325 mg total) by mouth once daily.    CIMETIDINE (TAGAMET) 200 MG TABLET    Take 1 tablet (200 mg total) by mouth 4 (four) times daily.    HYDRALAZINE (APRESOLINE) 100 MG TABLET    Take 1 tablet (100 mg total) by mouth every 8 (eight) hours.    IPRATROPIUM (ATROVENT) 21 MCG (0.03 %) NASAL SPRAY    2 sprays by Each Nostril route 3 (three) times daily.    SPIRONOLACTONE (ALDACTONE) 25 MG TABLET    Take 1 tablet (25 mg total) by mouth once daily.    VITAMIN D (VITAMIN D3) 1000 UNITS TAB    Take 2 tablets (2,000 Units total) by mouth once daily.   Changed and/or Refilled Medications    Modified Medication Previous Medication    AMLODIPINE (NORVASC) 10 MG TABLET amLODIPine (NORVASC) 10 MG tablet       Take 1 tablet (10 mg total) by mouth once daily.    Take 1 tablet (10 mg total) by mouth  "once daily.    CANDESARTAN (ATACAND) 32 MG TABLET candesartan (ATACAND) 32 MG tablet       Take 1 tablet (32 mg total) by mouth once daily.    Take 1 tablet (32 mg total) by mouth once daily.    CARVEDILOL (COREG) 25 MG TABLET carvediloL (COREG) 25 MG tablet       Take 1 tablet (25 mg total) by mouth 2 (two) times daily.    Take 1 tablet (25 mg total) by mouth 2 (two) times daily.    CLONIDINE (CATAPRES) 0.2 MG TABLET cloNIDine (CATAPRES) 0.2 MG tablet       Take 1 tablet (0.2 mg total) by mouth 3 (three) times daily.    Take 1 tablet (0.2 mg total) by mouth 3 (three) times daily.    LORATADINE (CLARITIN) 10 MG TABLET loratadine (CLARITIN) 10 mg tablet       Take 1 tablet (10 mg total) by mouth once daily.    Take 10 mg by mouth.    TRAMADOL (ULTRAM) 50 MG TABLET traMADoL (ULTRAM) 50 mg tablet       Take 1 tablet (50 mg total) by mouth 3 (three) times daily.    Take 50 mg by mouth 3 (three) times daily.         Objective:  Vitals:    02/29/24 0840   BP: (!) 190/88   BP Location: Left forearm   Patient Position: Sitting   BP Method: Medium (Automatic)   Pulse: (!) 54   Resp: 18   Temp: 97.8 °F (36.6 °C)   TempSrc: Oral   SpO2: 98%   Weight: 99.8 kg (220 lb)   Height: 5' 4" (1.626 m)        Physical Exam    General: no acute distress  CV: Regular rate rhythm, no murmurs, no edema, 2+ peripheral pulses  Resp: Non-labored breathing, symmetrical chest expansion bilaterally  Abd: soft, non-tender to palpation, +BS  MSK: no deformities, full ROM in all ext, mild tenderness to lower back on palpation     Lab Results   Component Value Date     05/22/2023    K 4.3 05/22/2023    CO2 34 (H) 05/22/2023    BUN 10.9 05/22/2023    CREATININE 0.94 05/22/2023    EGFRIFAFRICA >60 09/24/2021    EGFRNONAA >60 09/24/2021    AST 20 03/02/2023    ALT 36 03/02/2023     CBC:  Lab Results   Component Value Date    WBC 6.8 03/02/2023    HGB 12.5 03/02/2023    HCT 39.5 03/02/2023    MCV 76.4 (L) 03/02/2023     03/02/2023     Lipid " Panel:  Lab Results   Component Value Date    CHOL 222 (H) 12/04/2023    TRIG 113 12/04/2023    HDL 56 12/04/2023    .00 (H) 12/04/2023     Diabetes:  Lab Results   Component Value Date    HGBA1C 5.8 12/04/2023    MICALBCREAT 8.8 10/17/2022     Thyroid:  Lab Results   Component Value Date    TSH 3.1500 10/17/2022       Assessment/Plan:  Dayami was seen today for medication refill and follow-up.     Diagnoses and all orders for this visit:    Degeneration of intervertebral disc of lumbar region  -     Reordered one month worth of Tramadol 50 mg TID to [facility] pharmacy  - Pt requested future prescriptions be called in to prescription mailing service, ordered 2nd and 3rd month to [facility] mailing     Primary hypertension  -     Refilled amLODIPine, candesartan, cloNIDine, and carvediloL for next 3 months    Seasonal allergic rhinitis due to pollen  -     Refilled loratadine (CLARITIN) 10 mg tablet    Health Maintenance   Topic Date Due    DEXA Scan  05/16/2019    Lipid Panel  12/04/2028    TETANUS VACCINE  11/21/2029    Hepatitis C Screening  Completed    Shingles Vaccine  Completed       Follow up in about 2 weeks (around 3/14/2024). For BP check.     Future Appointments   Date Time Provider Department Center   4/15/2024  8:20 AM Krzysztof Lamb MD [facility]     Krzysztof Lamb MD  [facility], [facility]

## 2024-03-20 NOTE — PROGRESS NOTES
Faculty addendum: Patient discussed with resident. Chart was reviewed including vitals, labs, etc. Care provided reasonable and necessary. I participated in the management of the patient and was immediately available throughout the encounter. Services were furnished in a primary care center located in the outpatient department of a [facility] hospital. I agree with the resident's findings and plan as documented in the resident's note.

## 2024-04-05 DIAGNOSIS — I10 HYPERTENSION, UNSPECIFIED TYPE: ICD-10-CM

## 2024-04-05 RX ORDER — HYDRALAZINE HYDROCHLORIDE 100 MG/1
100 TABLET, FILM COATED ORAL EVERY 8 HOURS
Qty: 90 TABLET | Refills: 3 | Status: SHIPPED | OUTPATIENT
Start: 2024-04-05 | End: 2024-04-16

## 2024-04-16 ENCOUNTER — OFFICE VISIT (OUTPATIENT)
Dept: FAMILY MEDICINE | Facility: CLINIC | Age: 79
End: 2024-04-16
Payer: COMMERCIAL

## 2024-04-16 VITALS
SYSTOLIC BLOOD PRESSURE: 150 MMHG | HEIGHT: 64 IN | BODY MASS INDEX: 38.93 KG/M2 | OXYGEN SATURATION: 100 % | DIASTOLIC BLOOD PRESSURE: 74 MMHG | HEART RATE: 51 BPM | RESPIRATION RATE: 18 BRPM | TEMPERATURE: 98 F | WEIGHT: 228 LBS

## 2024-04-16 DIAGNOSIS — M25.562 ACUTE PAIN OF BOTH KNEES: Primary | ICD-10-CM

## 2024-04-16 DIAGNOSIS — M19.90 OSTEOARTHRITIS, UNSPECIFIED OSTEOARTHRITIS TYPE, UNSPECIFIED SITE: ICD-10-CM

## 2024-04-16 DIAGNOSIS — J30.1 SEASONAL ALLERGIC RHINITIS DUE TO POLLEN: ICD-10-CM

## 2024-04-16 DIAGNOSIS — I50.32 CHRONIC DIASTOLIC CONGESTIVE HEART FAILURE: ICD-10-CM

## 2024-04-16 DIAGNOSIS — M51.36 DEGENERATION OF INTERVERTEBRAL DISC OF LUMBAR REGION: ICD-10-CM

## 2024-04-16 DIAGNOSIS — I10 PRIMARY HYPERTENSION: ICD-10-CM

## 2024-04-16 DIAGNOSIS — M25.561 ACUTE PAIN OF BOTH KNEES: Primary | ICD-10-CM

## 2024-04-16 PROBLEM — R53.81 DEBILITY: Status: ACTIVE | Noted: 2024-04-09

## 2024-04-16 PROBLEM — R94.31 QT PROLONGATION: Status: ACTIVE | Noted: 2024-04-07

## 2024-04-16 PROBLEM — I50.9 CHRONIC CONGESTIVE HEART FAILURE: Status: ACTIVE | Noted: 2024-04-06

## 2024-04-16 LAB
ALBUMIN SERPL-MCNC: 2.8 G/DL (ref 3.4–4.8)
ALBUMIN/GLOB SERPL: 0.9 RATIO (ref 1.1–2)
ALP SERPL-CCNC: 80 UNIT/L (ref 40–150)
ALT SERPL-CCNC: 11 UNIT/L (ref 0–55)
AST SERPL-CCNC: 8 UNIT/L (ref 5–34)
BASOPHILS # BLD AUTO: 0.05 X10(3)/MCL
BASOPHILS NFR BLD AUTO: 0.7 %
BILIRUB SERPL-MCNC: 0.2 MG/DL
BUN SERPL-MCNC: 8.1 MG/DL (ref 9.8–20.1)
CALCIUM SERPL-MCNC: 9 MG/DL (ref 8.4–10.2)
CHLORIDE SERPL-SCNC: 107 MMOL/L (ref 98–107)
CO2 SERPL-SCNC: 30 MMOL/L (ref 23–31)
CREAT SERPL-MCNC: 0.96 MG/DL (ref 0.55–1.02)
EOSINOPHIL # BLD AUTO: 0.26 X10(3)/MCL (ref 0–0.9)
EOSINOPHIL NFR BLD AUTO: 3.8 %
ERYTHROCYTE [DISTWIDTH] IN BLOOD BY AUTOMATED COUNT: 19 % (ref 11.5–17)
GFR SERPLBLD CREATININE-BSD FMLA CKD-EPI: 60 MLS/MIN/1.73/M2
GLOBULIN SER-MCNC: 3.2 GM/DL (ref 2.4–3.5)
GLUCOSE SERPL-MCNC: 102 MG/DL (ref 82–115)
HCT VFR BLD AUTO: 29.2 % (ref 37–47)
HGB BLD-MCNC: 9.3 G/DL (ref 12–16)
IMM GRANULOCYTES # BLD AUTO: 0.01 X10(3)/MCL (ref 0–0.04)
IMM GRANULOCYTES NFR BLD AUTO: 0.1 %
LYMPHOCYTES # BLD AUTO: 2.18 X10(3)/MCL (ref 0.6–4.6)
LYMPHOCYTES NFR BLD AUTO: 31.8 %
MAGNESIUM SERPL-MCNC: 1.7 MG/DL (ref 1.6–2.6)
MCH RBC QN AUTO: 25.3 PG (ref 27–31)
MCHC RBC AUTO-ENTMCNC: 31.8 G/DL (ref 33–36)
MCV RBC AUTO: 79.6 FL (ref 80–94)
MONOCYTES # BLD AUTO: 0.86 X10(3)/MCL (ref 0.1–1.3)
MONOCYTES NFR BLD AUTO: 12.5 %
NEUTROPHILS # BLD AUTO: 3.5 X10(3)/MCL (ref 2.1–9.2)
NEUTROPHILS NFR BLD AUTO: 51.1 %
NRBC BLD AUTO-RTO: 0 %
PHOSPHATE SERPL-MCNC: 3.2 MG/DL (ref 2.3–4.7)
PLATELET # BLD AUTO: 271 X10(3)/MCL (ref 130–400)
PMV BLD AUTO: 11.2 FL (ref 7.4–10.4)
POTASSIUM SERPL-SCNC: 4.6 MMOL/L (ref 3.5–5.1)
PROT SERPL-MCNC: 6 GM/DL (ref 5.8–7.6)
RBC # BLD AUTO: 3.67 X10(6)/MCL (ref 4.2–5.4)
SODIUM SERPL-SCNC: 142 MMOL/L (ref 136–145)
TSH SERPL-ACNC: 2.13 UIU/ML (ref 0.35–4.94)
WBC # SPEC AUTO: 6.86 X10(3)/MCL (ref 4.5–11.5)

## 2024-04-16 PROCEDURE — 84443 ASSAY THYROID STIM HORMONE: CPT

## 2024-04-16 PROCEDURE — 36415 COLL VENOUS BLD VENIPUNCTURE: CPT

## 2024-04-16 PROCEDURE — 99215 OFFICE O/P EST HI 40 MIN: CPT | Mod: PBBFAC

## 2024-04-16 PROCEDURE — 83735 ASSAY OF MAGNESIUM: CPT

## 2024-04-16 PROCEDURE — 80053 COMPREHEN METABOLIC PANEL: CPT

## 2024-04-16 PROCEDURE — 85025 COMPLETE CBC W/AUTO DIFF WBC: CPT

## 2024-04-16 PROCEDURE — 84100 ASSAY OF PHOSPHORUS: CPT

## 2024-04-16 RX ORDER — CANDESARTAN 32 MG/1
32 TABLET ORAL DAILY
Qty: 90 TABLET | Refills: 1 | Status: SHIPPED | OUTPATIENT
Start: 2024-04-16 | End: 2024-05-29 | Stop reason: SDUPTHER

## 2024-04-16 RX ORDER — ATORVASTATIN CALCIUM 40 MG/1
40 TABLET, FILM COATED ORAL DAILY
Qty: 90 TABLET | Refills: 1 | Status: SHIPPED | OUTPATIENT
Start: 2024-04-16 | End: 2024-05-29 | Stop reason: SDUPTHER

## 2024-04-16 RX ORDER — AMLODIPINE BESYLATE 10 MG/1
10 TABLET ORAL DAILY
Qty: 90 TABLET | Refills: 1 | Status: SHIPPED | OUTPATIENT
Start: 2024-04-16 | End: 2024-05-29 | Stop reason: SDUPTHER

## 2024-04-16 RX ORDER — ZINC GLUCONATE 13.3 MG
200 LOZENGE ORAL 4 TIMES DAILY
COMMUNITY
End: 2024-05-29

## 2024-04-16 RX ORDER — HYDRALAZINE HYDROCHLORIDE 100 MG/1
100 TABLET, FILM COATED ORAL 2 TIMES DAILY
Qty: 180 TABLET | Refills: 1 | Status: SHIPPED | OUTPATIENT
Start: 2024-04-16 | End: 2024-05-29 | Stop reason: SDUPTHER

## 2024-04-16 RX ORDER — CLONIDINE HYDROCHLORIDE 0.2 MG/1
0.2 TABLET ORAL 2 TIMES DAILY
Qty: 180 TABLET | Refills: 1 | Status: SHIPPED | OUTPATIENT
Start: 2024-04-16 | End: 2024-05-29 | Stop reason: SDUPTHER

## 2024-04-16 RX ORDER — CARVEDILOL 12.5 MG/1
12.5 TABLET ORAL 2 TIMES DAILY WITH MEALS
COMMUNITY
End: 2024-04-16 | Stop reason: SDUPTHER

## 2024-04-16 RX ORDER — CARVEDILOL 12.5 MG/1
12.5 TABLET ORAL 2 TIMES DAILY WITH MEALS
Qty: 180 TABLET | Refills: 1 | Status: SHIPPED | OUTPATIENT
Start: 2024-04-16 | End: 2024-05-29 | Stop reason: SDUPTHER

## 2024-04-16 RX ORDER — HYDRALAZINE HYDROCHLORIDE 100 MG/1
100 TABLET, FILM COATED ORAL 2 TIMES DAILY
COMMUNITY
End: 2024-04-16 | Stop reason: SDUPTHER

## 2024-04-16 RX ORDER — LORATADINE 10 MG/1
10 TABLET ORAL DAILY
Qty: 90 TABLET | Refills: 1 | Status: SHIPPED | OUTPATIENT
Start: 2024-04-16 | End: 2024-05-29 | Stop reason: SDUPTHER

## 2024-04-16 RX ORDER — ASPIRIN 81 MG/1
1 TABLET ORAL EVERY MORNING
COMMUNITY
End: 2024-05-29 | Stop reason: SDUPTHER

## 2024-04-16 NOTE — PROGRESS NOTES
[facility] OFFICE VISIT NOTE  MRN: [id number]  Date: 04/16/2024    Chief Complaint:   Follow-up (Hospital follow up.), Knee Pain (Left knee pain, daughter is requesting a x-ray), and Medication Refill      Subjective:      EVELIO  Dayami Aleman is a 78 y.o. female with PMHx of Hypertension, GERD, OA, and diastolic CHF presenting to [facility] with her daughter for hospital follow up, knee pain from a fall and medication refill.       Patient was admitted to [facility] on 04/05/24-04/11/24 for evaluation of 4 days of nausea and vomiting. She states she was not taking her normal outpatient medication during those 4 days due to the nausea and vomiting. She was found to have an NSTEMI with marginally elevated troponins, an MARGIE and a UTI at the time hospital admission. TTE was performed in the hospital showing grade I (mild) left ventricular diastolic dysfunction with a EF of 60-65%, global calcification of the aortic valve with mild aortic stenosis and low central venous pressure (0-5mm Hg). CT Head without contrast showed no acute intracranial abnormalities. Patient was given Rocephin for the UTI  E coli with possible Proteus and discharged upon improved renal function.     In office today, patient is feeling better. She denies nausea, vomiting, dizziness, headache, chest pain, SOB, dysuria, hematuria, constipation, or diarrhea. She states she has resumed home medication and has been eating without any issues. She has never had a NSTEMI/STEMI prior to this hospitalization. She denies previously seeing an outpatient cardiologist.  Patient states she hasn't been taking ambulatory blood pressure measurements at home, but has been adherent to the medication changes that were on her hospital discharge summary.     Bilateral Knee Pain  Recent Falls  Patient states she fell off the toilet due to dizziness prior to the hospitalization. She has been having bilateral pain in the knees since and would like to get them x rayed.  She states nothing has improved it and denies any topical application of medication to improve symptoms. She takes Tramadol 50 mg q8hrs as prescribed by her PCP which helped the pain.       ROS as seen in HPI above.     Current Medications:   Current Outpatient Medications   Medication Sig Dispense Refill    albuterol (PROVENTIL HFA) 90 mcg/actuation inhaler Inhale 2 puffs into the lungs every 6 (six) hours as needed for Wheezing. Rescue 18 g 0    amLODIPine (NORVASC) 10 MG tablet Take 1 tablet (10 mg total) by mouth once daily. 90 tablet 1    aspirin (ECOTRIN) 81 MG EC tablet Take 1 tablet by mouth every morning.      atorvastatin (LIPITOR) 40 MG tablet Take 1 tablet (40 mg total) by mouth once daily. 90 tablet 1    candesartan (ATACAND) 32 MG tablet Take 1 tablet (32 mg total) by mouth once daily. 90 tablet 1    carvediloL (COREG) 12.5 MG tablet Take 1 tablet (12.5 mg total) by mouth 2 (two) times daily with meals. 180 tablet 1    cimetidine (TAGAMET) 200 MG tablet Take 200 mg by mouth 4 (four) times daily.      cloNIDine (CATAPRES) 0.2 MG tablet Take 1 tablet (0.2 mg total) by mouth 2 (two) times daily. 180 tablet 1    hydrALAZINE (APRESOLINE) 100 MG tablet Take 1 tablet (100 mg total) by mouth 2 (two) times daily. 180 tablet 1    ipratropium (ATROVENT) 21 mcg (0.03 %) nasal spray 2 sprays by Each Nostril route 3 (three) times daily. 30 mL 0    loratadine (CLARITIN) 10 mg tablet Take 1 tablet (10 mg total) by mouth once daily. 90 tablet 1    ondansetron (ZOFRAN-ODT) 4 MG TbDL Take 4 mg by mouth every 8 (eight) hours as needed.      traMADoL (ULTRAM) 50 mg tablet Take 1 tablet (50 mg total) by mouth 3 (three) times daily. 90 tablet 0    traMADoL (ULTRAM) 50 mg tablet Take 1 tablet (50 mg total) by mouth 3 (three) times daily. 90 tablet 0    [START ON 4/29/2024] traMADoL (ULTRAM) 50 mg tablet Take 1 tablet (50 mg total) by mouth every 6 (six) hours as needed for Pain. 90 tablet 0    vitamin D (VITAMIN D3) 1000  "units Tab Take 2 tablets (2,000 Units total) by mouth once daily. 60 tablet 5     No current facility-administered medications for this visit.       Objective:  Blood pressure (!) 150/74, pulse (!) 51, temperature 98.1 °F (36.7 °C), temperature source Oral, resp. rate 18, height 5' 4" (1.626 m), weight 103.4 kg (228 lb), SpO2 100%.   Physical Exam  Constitutional:       Appearance: Normal appearance.   Cardiovascular:      Rate and Rhythm: Normal rate and regular rhythm.      Heart sounds: Murmur (systolic) heard.   Pulmonary:      Effort: Pulmonary effort is normal.      Breath sounds: No wheezing.   Abdominal:      General: Bowel sounds are normal. There is no distension.      Palpations: Abdomen is soft. There is no mass.      Tenderness: There is no abdominal tenderness.   Musculoskeletal:      Comments: B/L medial joint line tenderness to palpation at knee joints. No skin lesions or bleeding or swelling noted to knees.   Neurological:      Mental Status: She is alert and oriented to person, place, and time.      Gait: Gait abnormal (antalgic without assitive device.).   Psychiatric:         Behavior: Behavior normal.           Assessment/Plan      Recent Fall  Acute pain of both knees  Degeneration of intervertebral disc of lumbar region  - X-Ray Knee Complete 4 or More Views Left; Future  - X-Ray Knee Complete 4 Or More Views Right; Future  - continue to take Tramadol 50 mg q8h PRN for pain control; medications NOT refilled at this visit as noted on  that medication recently refilled by PCP.   - Encouraged patient to schedule DXA scan and she was provided with central scheduling number to schedule knee xrays and DXA.    Chronic diastolic congestive heart failure  Primary hypertension  Bradycardia  Normocytic Anemia  - CBC Auto Differential, Comprehensive Metabolic Panel, Magnesium, Phosphorus, and TSH collected at this visit; follow up CBC for causes of anemia; denies overt bleeding at this time.  - " Ambulatory referral/consult to Cardiology placed to [facility]  - continue amLODIPine (NORVASC) 10 MG tablet; Take 1 tablet (10 mg total) by mouth once daily.  Dispense: 90 tablet; Refill: 1  - continue candesartan (ATACAND) 32 MG tablet; Take 1 tablet (32 mg total) by mouth once daily.  Dispense: 90 tablet; Refill: 1  - continue carvediloL (COREG) 12.5 MG tablet; Take 1 tablet (12.5 mg total) by mouth 2 (two) times daily with meals.  Dispense: 180 tablet; Refill: 1. Continue to monitor heart rate and decrease coreg dose accordingly.  - continue cloNIDine (CATAPRES) 0.2 MG tablet; Take 1 tablet (0.2 mg total) by mouth 2 (two) times daily.  Dispense: 180 tablet; Refill: 1  - continue hydrALAZINE (APRESOLINE) 100 MG tablet; Take 1 tablet (100 mg total) by mouth 2 (two) times daily.  Dispense: 180 tablet; Refill: 1  - DISCONTINUE spironolactone 25 mg daily as previously prescribed. Continue to monitor HR and BP at home and bring BP log into clinic at next visit to evaluate need for medication changes.        Return to clinic in 4-6 weeks with PCP for repeat BP check after medication changes noted above, or sooner if needed.       Radha Francois DO  [facility] Resident, [facility]

## 2024-04-22 NOTE — PROGRESS NOTES
Faculty addendum: Patient discussed and examined with resident on day of encounter.  Care provided reasonable and necessary. I participated in the management of the patient and was immediately available throughout the encounter. Services were furnished in a primary care center located in the outpatient department of a [facility]. I agree with the resident's findings and plan as documented in the resident's note.     Physical exam: in no acute distress, non labored respirations; bilateral knees without swelling, bruising, abrasions     Sarah Conner DO

## 2024-04-23 ENCOUNTER — HOSPITAL ENCOUNTER (OUTPATIENT)
Dept: RADIOLOGY | Facility: HOSPITAL | Age: 79
Discharge: HOME | End: 2024-04-23
Payer: COMMERCIAL

## 2024-04-23 DIAGNOSIS — M19.90 OSTEOARTHRITIS, UNSPECIFIED OSTEOARTHRITIS TYPE, UNSPECIFIED SITE: ICD-10-CM

## 2024-04-23 DIAGNOSIS — M25.561 ACUTE PAIN OF BOTH KNEES: ICD-10-CM

## 2024-04-23 DIAGNOSIS — M25.562 ACUTE PAIN OF BOTH KNEES: ICD-10-CM

## 2024-04-23 DIAGNOSIS — E55.9 VITAMIN D DEFICIENCY: ICD-10-CM

## 2024-04-23 PROCEDURE — 73564 X-RAY EXAM KNEE 4 OR MORE: CPT | Mod: TC,LT

## 2024-04-23 PROCEDURE — 77080 DXA BONE DENSITY AXIAL: CPT | Mod: TC

## 2024-04-23 PROCEDURE — 73564 X-RAY EXAM KNEE 4 OR MORE: CPT | Mod: TC,RT

## 2024-05-29 ENCOUNTER — OFFICE VISIT (OUTPATIENT)
Dept: FAMILY MEDICINE | Facility: CLINIC | Age: 79
End: 2024-05-29
Payer: COMMERCIAL

## 2024-05-29 VITALS
OXYGEN SATURATION: 99 % | SYSTOLIC BLOOD PRESSURE: 133 MMHG | HEART RATE: 60 BPM | TEMPERATURE: 98 F | WEIGHT: 217 LBS | DIASTOLIC BLOOD PRESSURE: 63 MMHG | BODY MASS INDEX: 37.05 KG/M2 | HEIGHT: 64 IN

## 2024-05-29 DIAGNOSIS — G47.33 OBSTRUCTIVE SLEEP APNEA SYNDROME: ICD-10-CM

## 2024-05-29 DIAGNOSIS — I50.32 CHRONIC DIASTOLIC CONGESTIVE HEART FAILURE: ICD-10-CM

## 2024-05-29 DIAGNOSIS — I10 PRIMARY HYPERTENSION: Primary | ICD-10-CM

## 2024-05-29 DIAGNOSIS — J30.1 SEASONAL ALLERGIC RHINITIS DUE TO POLLEN: ICD-10-CM

## 2024-05-29 DIAGNOSIS — D64.9 ANEMIA, UNSPECIFIED TYPE: ICD-10-CM

## 2024-05-29 DIAGNOSIS — E78.5 HYPERLIPIDEMIA, UNSPECIFIED HYPERLIPIDEMIA TYPE: ICD-10-CM

## 2024-05-29 DIAGNOSIS — M51.36 DEGENERATION OF INTERVERTEBRAL DISC OF LUMBAR REGION: ICD-10-CM

## 2024-05-29 PROCEDURE — 99215 OFFICE O/P EST HI 40 MIN: CPT | Mod: PBBFAC

## 2024-05-29 RX ORDER — ALBUTEROL SULFATE 90 UG/1
2 AEROSOL, METERED RESPIRATORY (INHALATION) EVERY 6 HOURS PRN
Qty: 18 G | Refills: 0 | Status: SHIPPED | OUTPATIENT
Start: 2024-05-29

## 2024-05-29 RX ORDER — ATORVASTATIN CALCIUM 40 MG/1
40 TABLET, FILM COATED ORAL DAILY
Qty: 90 TABLET | Refills: 1 | Status: SHIPPED | OUTPATIENT
Start: 2024-05-29

## 2024-05-29 RX ORDER — FERROUS SULFATE 325(65) MG
325 TABLET ORAL
Qty: 30 TABLET | Refills: 2 | Status: SHIPPED | OUTPATIENT
Start: 2024-05-29

## 2024-05-29 RX ORDER — CARVEDILOL 25 MG/1
25 TABLET ORAL 2 TIMES DAILY
Qty: 90 TABLET | Refills: 1 | Status: SHIPPED | OUTPATIENT
Start: 2024-05-29

## 2024-05-29 RX ORDER — CLONIDINE HYDROCHLORIDE 0.2 MG/1
0.2 TABLET ORAL 2 TIMES DAILY
Qty: 180 TABLET | Refills: 1 | Status: SHIPPED | OUTPATIENT
Start: 2024-05-29

## 2024-05-29 RX ORDER — TRAMADOL HYDROCHLORIDE 50 MG/1
50 TABLET ORAL EVERY 6 HOURS PRN
Qty: 90 TABLET | Refills: 1 | Status: SHIPPED | OUTPATIENT
Start: 2024-05-29 | End: 2024-06-28

## 2024-05-29 RX ORDER — HYDRALAZINE HYDROCHLORIDE 100 MG/1
100 TABLET, FILM COATED ORAL 2 TIMES DAILY
Qty: 180 TABLET | Refills: 1 | Status: SHIPPED | OUTPATIENT
Start: 2024-05-29

## 2024-05-29 RX ORDER — ASPIRIN 81 MG/1
81 TABLET ORAL EVERY MORNING
Qty: 90 TABLET | Refills: 1 | Status: SHIPPED | OUTPATIENT
Start: 2024-05-29

## 2024-05-29 RX ORDER — LORATADINE 10 MG/1
10 TABLET ORAL DAILY
Qty: 90 TABLET | Refills: 1 | Status: SHIPPED | OUTPATIENT
Start: 2024-05-29

## 2024-05-29 RX ORDER — CANDESARTAN 32 MG/1
32 TABLET ORAL DAILY
Qty: 90 TABLET | Refills: 1 | Status: SHIPPED | OUTPATIENT
Start: 2024-05-29

## 2024-05-29 RX ORDER — AMLODIPINE BESYLATE 10 MG/1
10 TABLET ORAL DAILY
Qty: 90 EACH | Refills: 1 | Status: SHIPPED | OUTPATIENT
Start: 2024-05-29

## 2024-05-29 NOTE — PATIENT INSTRUCTIONS
Apt with [facility] on June 4th @ 8:00 PM w/ Dr. Prabhu Reid.  Patient currently on amlodipine 10 mg daily, candesartan 32 mg daily, coreg 25 mg BID, hydralazine 100 mg BID, and clonidine 0.2 mg BID

## 2024-05-29 NOTE — PROGRESS NOTES
[facility] Progress Note    ID:  Dayami Aleman   MRN:  [id number]     5/29/2024    Chief Complaint:    Chief Complaint   Patient presents with    Hypertension    Back Pain    MED REFILLS     History of Present Illness:  Dayami Aleman is a 79 y.o. female who presents with her daughter to [facility] clinic for hypertension follow up. Of note pt was admitted to [facility] on 04/05/24-04/11/24 for evaluation of 4 days of nausea and vomiting. She states she was not taking her normal outpatient medication during those 4 days due to the nausea and vomiting. She was found to have an NSTEMI with marginally elevated troponins, an MARGIE and a UTI at the time hospital admission. TTE was performed in the hospital showing grade I (mild) left ventricular diastolic dysfunction with a EF of 60-65%, global calcification of the aortic valve with mild aortic stenosis and low central venous pressure (0-5mm Hg). CT Head without contrast showed no acute intracranial abnormalities. Patient was given Rocephin for the UTI  E coli with possible Proteus and discharged upon improved renal function. Patient has appointment w/ [facility] on June 4th at 8:00 a.m.    Acute Concerns:  In office today, patient is feeling better. Denies chest pain, sob. Patient reports improvement in knee pain since last visit.  No falls since last visit, using a cane to ambulate.     Chronic Conditions:  Multilevel Lumbar Spine Degenerative Disease: Takes Tramadol 50mg TID with adequate results. Continues to have pain, but still gets out of the house as frequently as she can to play bingo. Rates pain as 6/10 today.      MAYANK: Sleeps with CPAP machine every night, Able to sleep through the night with the machine, only takes off to go to restroom and puts back on when she gets back in the bed. Has had this machine for 10-12 years, reports same setting since getting the machine.     HTN w/ diastolic CHF: Has been checking her BP at home, reports SBP 140s to 150s and  DBP 70s to 80s. BP controlled in office today 133/63. Currently on quintuple therapy; amlodipine 10 mg daily, candesartan 32 mg daily, coreg 25 mg BID, hydralazine 100 mg BID, and clonidine 0.2 mg BID. Denies chest pain, SOB, or vision changes.  Patient has appointment with [facility] on June 4th at 8:00 a.m. to establish care     GERD: Avoiding trigger foods. Continues with Cimetidine as needed. Controlled at this time.     Vit D Deficiency: taking Vit D daily, vit d levels wnl at last check in 12/23.     Anemia:  Patient with hemoglobin of 9.3 seen on CBC done on 04/16/2024.  Patient with out a history of anemia.  RDW elevated an MCV decreased.  Anemia likely secondary to iron-deficiency     Health Maintenance:  DXA Scan: Completed on 04/23/2024 interpreted as normal bone mineral density according to who criteria    Review of Systems  As per HPI    Medical History  Review of patient's allergies indicates:   Allergen Reactions    Sulfa (sulfonamide antibiotics) Itching and Swelling     Past Medical History:   Diagnosis Date    GERD (gastroesophageal reflux disease)     Hypertension     Other seasonal allergic rhinitis     Unspecified osteoarthritis, unspecified site     Vitamin D deficiency      Social Hx:  reports that she has never smoked. She has never used smokeless tobacco. She reports that she does not currently use alcohol. She reports that she does not use drugs.  Social History     Tobacco Use    Smoking status: Never    Smokeless tobacco: Never   Substance Use Topics    Alcohol use: Not Currently    Drug use: Never     FH: family history includes Hypertension in her father and mother; Stroke in her brother, father, and mother.  Medication List with Changes/Refills   New Medications    FERROUS SULFATE (IRON) 325 MG (65 MG IRON) TAB TABLET    Take 1 tablet (325 mg total) by mouth daily with breakfast.   Current Medications    IPRATROPIUM (ATROVENT) 21 MCG (0.03 %) NASAL SPRAY    2 sprays by Each Nostril route 3  (three) times daily.    ONDANSETRON (ZOFRAN-ODT) 4 MG TBDL    Take 4 mg by mouth every 8 (eight) hours as needed.    VITAMIN D (VITAMIN D3) 1000 UNITS TAB    Take 2 tablets (2,000 Units total) by mouth once daily.   Changed and/or Refilled Medications    Modified Medication Previous Medication    ALBUTEROL (PROVENTIL HFA) 90 MCG/ACTUATION INHALER albuterol (PROVENTIL HFA) 90 mcg/actuation inhaler       Inhale 2 puffs into the lungs every 6 (six) hours as needed for Wheezing. Rescue    Inhale 2 puffs into the lungs every 6 (six) hours as needed for Wheezing. Rescue    AMLODIPINE (NORVASC) 10 MG TABLET amLODIPine (NORVASC) 10 MG tablet       Take 1 tablet (10 mg total) by mouth once daily.    Take 1 tablet (10 mg total) by mouth once daily.    ASPIRIN (ECOTRIN) 81 MG EC TABLET aspirin (ECOTRIN) 81 MG EC tablet       Take 1 tablet (81 mg total) by mouth every morning.    Take 1 tablet by mouth every morning.    ATORVASTATIN (LIPITOR) 40 MG TABLET atorvastatin (LIPITOR) 40 MG tablet       Take 1 tablet (40 mg total) by mouth once daily.    Take 1 tablet (40 mg total) by mouth once daily.    CANDESARTAN (ATACAND) 32 MG TABLET candesartan (ATACAND) 32 MG tablet       Take 1 tablet (32 mg total) by mouth once daily.    Take 1 tablet (32 mg total) by mouth once daily.    CARVEDILOL (COREG) 25 MG TABLET carvediloL (COREG) 12.5 MG tablet       Take 1 tablet (25 mg total) by mouth 2 (two) times daily.    Take 1 tablet (12.5 mg total) by mouth 2 (two) times daily with meals.    CLONIDINE (CATAPRES) 0.2 MG TABLET cloNIDine (CATAPRES) 0.2 MG tablet       Take 1 tablet (0.2 mg total) by mouth 2 (two) times daily.    Take 1 tablet (0.2 mg total) by mouth 2 (two) times daily.    HYDRALAZINE (APRESOLINE) 100 MG TABLET hydrALAZINE (APRESOLINE) 100 MG tablet       Take 1 tablet (100 mg total) by mouth 2 (two) times daily.    Take 1 tablet (100 mg total) by mouth 2 (two) times daily.    LORATADINE (CLARITIN) 10 MG TABLET loratadine  "(CLARITIN) 10 mg tablet       Take 1 tablet (10 mg total) by mouth once daily.    Take 1 tablet (10 mg total) by mouth once daily.    TRAMADOL (ULTRAM) 50 MG TABLET traMADoL (ULTRAM) 50 mg tablet       Take 1 tablet (50 mg total) by mouth every 6 (six) hours as needed for Pain.    Take 1 tablet (50 mg total) by mouth every 6 (six) hours as needed for Pain.   Discontinued Medications    CIMETIDINE (TAGAMET) 200 MG TABLET    Take 200 mg by mouth 4 (four) times daily.    TRAMADOL (ULTRAM) 50 MG TABLET    Take 1 tablet (50 mg total) by mouth 3 (three) times daily.     Objective:  Vitals:    05/29/24 1010   BP: 133/63   BP Location: Left arm   Patient Position: Sitting   BP Method: Large (Automatic)   Pulse: 60   Temp: 98.2 °F (36.8 °C)   TempSrc: Oral   SpO2: 99%   Weight: 98.4 kg (217 lb)   Height: 5' 4" (1.626 m)      Physical Exam    General: no acute distress  CV: Regular rate rhythm, systolic murmurs, no edema, 2+ peripheral pulses  Resp: Non-labored breathing, symmetrical chest expansion bilaterally  Abd: soft, non-tender to palpation, +BS  MSK: no deformities, full ROM in all ext, gait antalgic without cane    Lab Results   Component Value Date     04/16/2024    K 4.6 04/16/2024     04/11/2024    CO2 30 04/16/2024    BUN 8.1 (L) 04/16/2024    CREATININE 0.96 04/16/2024    EGFRIFAFRICA >60 09/24/2021    EGFRNONAA >60 09/24/2021    AST 8 04/16/2024    ALT 11 04/16/2024     CBC:  Lab Results   Component Value Date    WBC 6.86 04/16/2024    HGB 9.3 (L) 04/16/2024    HCT 29.2 (L) 04/16/2024    MCV 79.6 (L) 04/16/2024     04/16/2024     Lipid Panel:  Lab Results   Component Value Date    CHOL 242 (H) 04/06/2024    TRIG 161 (H) 04/06/2024    HDL 35 (L) 04/06/2024    .00 (H) 12/04/2023     Diabetes:  Lab Results   Component Value Date    HGBA1C 5.8 12/04/2023    MICALBCREAT 8.8 10/17/2022     Thyroid:  Lab Results   Component Value Date    TSH 2.135 04/16/2024       Assessment/Plan:  Dayami was " seen today for hypertension, knee pain and med refills.  -Refilled all 5 of patient's antihypertension medications.  Patient with blood pressure and pulse within normal limits today; denies chest pain or sob.  Patient with appointment to establish care with [facility] on June 4th.  -No recent falls since last visit.  Patient continues to ambulate with cane.  Patient reports improvement in knee pain since last visit.  Bilateral knee x-rays showed degenerative changes.  Patient has physical therapy every other week.  -Prescribed daily iron for patient due to CBC last month showing hemoglobin of 9.3 with elevated RDW.     Diagnoses and all orders for this visit:    Primary hypertension  Chronic diastolic congestive heart failure  -     Refilled carvediloL (COREG) 25 MG tablet; Take 1 tablet (25 mg total) by mouth 2 (two) times daily.  -     Refilled hydrALAZINE (APRESOLINE) 100 MG tablet; Take 1 tablet (100 mg total) by mouth 2 (two) times daily.  -     Refilled amLODIPine (NORVASC) 10 MG tablet; Take 1 tablet (10 mg total) by mouth once daily.  -     Refilled candesartan (ATACAND) 32 MG tablet; Take 1 tablet (32 mg total) by mouth once daily.  -     Refilled cloNIDine (CATAPRES) 0.2 MG tablet; Take 1 tablet (0.2 mg total) by mouth 2 (two) times daily.  -     Refilled aspirin (ECOTRIN) 81 MG EC tablet; Take 1 tablet (81 mg total) by mouth every morning.  - Patient with appointment on June 4th to establish with [facility]    Seasonal allergic rhinitis due to pollen  -     Refilled loratadine (CLARITIN) 10 mg tablet; Take 1 tablet (10 mg total) by mouth once daily.    Obstructive sleep apnea syndrome  -     Refilled albuterol (PROVENTIL HFA) 90 mcg/actuation inhaler; Inhale 2 puffs into the lungs every 6 (six) hours as needed for Wheezing. Rescue    Hyperlipidemia, unspecified hyperlipidemia type  -     Refilled atorvastatin (LIPITOR) 40 MG tablet; Take 1 tablet (40 mg total) by mouth once daily.  -     Refilled aspirin (ECOTRIN) 81  MG EC tablet; Take 1 tablet (81 mg total) by mouth every morning.    Degeneration of intervertebral disc of lumbar region  -     Refilled traMADoL (ULTRAM) 50 mg tablet; Take 1 tablet (50 mg total) by mouth every 6 (six) hours as needed for Pain.    Health Maintenance   Topic Date Due    DEXA Scan  04/23/2027    Lipid Panel  04/06/2029    TETANUS VACCINE  11/21/2029    Shingles Vaccine  Completed       Follow up in about 3 months (around 8/29/2024).    Future Appointments   Date Time Provider Department Center   8/23/2024  9:40 AM Krzysztof Lamb MD [facility] Jaylan Lamb MD  [facility], [facility]

## 2024-05-29 NOTE — PROGRESS NOTES
[facility] FM Clinic Progress Note    ID:  Dayami Aleman   MRN:  [id number]     5/29/2024    Chief Complaint:    Chief Complaint   Patient presents with    Hypertension    Back Pain    MED REFILLS       History of Present Illness:  Dayami Aleman is a 79 y.o. female who presents to [facility] FM clinic for:     Acute Concerns:  Pt aggravated because she has been out of her blood pressure medicine and tramadol for one week. Pt states she called office multiple times to let us know that she was out of her medication. PCP was not notified by clinic.      Chronic Conditions:  Multilevel Lumbar Spine Degenerative Disease/OA of knees bilaterally: Takes Tramadol 50mg TID with adequate results. Continues to have pain, but still gets out of the house as frequently as she can to play bingo. Pain has gotten worse since running out of medicine, rates pain as 8/10 today.      MAYANK: Sleeps with CPAP machine every night, Able to sleep through the night with the machine, only takes off to go to restroom and puts back on when she gets back in the bed. Has had this machine for 10-12 years, reports same setting since getting the machine.     HTN: Has been checking her BP at home, reports SBP 140s to 150s and DBP 70s to 80s. BP elevated in office today (190/88) which is consistent with patient being out of blood pressure medicine for one week. Currently on quadruple therapy; coreg, clonidine, amlodipine, and candesartan.  Denies chest pain, SOB, or vision changes.     GERD: Avoiding trigger foods. Continues with Cimetidine as needed. Controlled at this time.     Vit D Deficiency: taking Vit D daily, vit d levels wnl at last check in 12/23.     Diastolic CHF: [facility] for evaluation of 4 days of nausea and vomiting. She states she was not taking her normal outpatient medication during those 4 days due to the nausea and vomiting. She was found to have an NSTEMI with marginally elevated  troponins, an MARGIE and a UTI at the time hospital admission. TTE was performed in the hospital showing grade I (mild) left ventricular diastolic dysfunction with a EF of 60-65%, global calcification of the aortic valve with mild aortic stenosis and low central venous pressure (0-5mm Hg)      Health Maintenance:  Ordered DXA at last visit, not completed, will reiterate need today    Review of Systems  As per HPI    Medical History  Review of patient's allergies indicates:   Allergen Reactions    Sulfa (sulfonamide antibiotics) Itching and Swelling     Past Medical History:   Diagnosis Date    GERD (gastroesophageal reflux disease)     Hypertension     Other seasonal allergic rhinitis     Unspecified osteoarthritis, unspecified site     Vitamin D deficiency      Social Hx:  reports that she has never smoked. She has never used smokeless tobacco. She reports that she does not currently use alcohol. She reports that she does not use drugs.  Social History     Tobacco Use    Smoking status: Never    Smokeless tobacco: Never   Substance Use Topics    Alcohol use: Not Currently    Drug use: Never     FH: family history includes Hypertension in her father and mother; Stroke in her brother, father, and mother.  Medication List with Changes/Refills   Current Medications    IPRATROPIUM (ATROVENT) 21 MCG (0.03 %) NASAL SPRAY    2 sprays by Each Nostril route 3 (three) times daily.    ONDANSETRON (ZOFRAN-ODT) 4 MG TBDL    Take 4 mg by mouth every 8 (eight) hours as needed.    VITAMIN D (VITAMIN D3) 1000 UNITS TAB    Take 2 tablets (2,000 Units total) by mouth once daily.   Changed and/or Refilled Medications    Modified Medication Previous Medication    ALBUTEROL (PROVENTIL HFA) 90 MCG/ACTUATION INHALER albuterol (PROVENTIL HFA) 90 mcg/actuation inhaler       Inhale 2 puffs into the lungs every 6 (six) hours as needed for Wheezing. Rescue    Inhale 2 puffs into the lungs every 6 (six) hours as needed for Wheezing. Rescue     AMLODIPINE (NORVASC) 10 MG TABLET amLODIPine (NORVASC) 10 MG tablet       Take 1 tablet (10 mg total) by mouth once daily.    Take 1 tablet (10 mg total) by mouth once daily.    ASPIRIN (ECOTRIN) 81 MG EC TABLET aspirin (ECOTRIN) 81 MG EC tablet       Take 1 tablet (81 mg total) by mouth every morning.    Take 1 tablet by mouth every morning.    ATORVASTATIN (LIPITOR) 40 MG TABLET atorvastatin (LIPITOR) 40 MG tablet       Take 1 tablet (40 mg total) by mouth once daily.    Take 1 tablet (40 mg total) by mouth once daily.    CANDESARTAN (ATACAND) 32 MG TABLET candesartan (ATACAND) 32 MG tablet       Take 1 tablet (32 mg total) by mouth once daily.    Take 1 tablet (32 mg total) by mouth once daily.    CARVEDILOL (COREG) 25 MG TABLET carvediloL (COREG) 12.5 MG tablet       Take 1 tablet (25 mg total) by mouth 2 (two) times daily.    Take 1 tablet (12.5 mg total) by mouth 2 (two) times daily with meals.    CLONIDINE (CATAPRES) 0.2 MG TABLET cloNIDine (CATAPRES) 0.2 MG tablet       Take 1 tablet (0.2 mg total) by mouth 2 (two) times daily.    Take 1 tablet (0.2 mg total) by mouth 2 (two) times daily.    HYDRALAZINE (APRESOLINE) 100 MG TABLET hydrALAZINE (APRESOLINE) 100 MG tablet       Take 1 tablet (100 mg total) by mouth 2 (two) times daily.    Take 1 tablet (100 mg total) by mouth 2 (two) times daily.    LORATADINE (CLARITIN) 10 MG TABLET loratadine (CLARITIN) 10 mg tablet       Take 1 tablet (10 mg total) by mouth once daily.    Take 1 tablet (10 mg total) by mouth once daily.    TRAMADOL (ULTRAM) 50 MG TABLET traMADoL (ULTRAM) 50 mg tablet       Take 1 tablet (50 mg total) by mouth every 6 (six) hours as needed for Pain.    Take 1 tablet (50 mg total) by mouth every 6 (six) hours as needed for Pain.   Discontinued Medications    CIMETIDINE (TAGAMET) 200 MG TABLET    Take 200 mg by mouth 4 (four) times daily.    TRAMADOL (ULTRAM) 50 MG TABLET    Take 1 tablet (50 mg total) by mouth 3 (three) times daily.  "        Objective:  Vitals:    05/29/24 1010   BP: 133/63   BP Location: Left arm   Patient Position: Sitting   BP Method: Large (Automatic)   Pulse: 60   Temp: 98.2 °F (36.8 °C)   TempSrc: Oral   SpO2: 99%   Weight: 98.4 kg (217 lb)   Height: 5' 4" (1.626 m)        Physical Exam    General: no acute distress  CV: Regular rate rhythm, no murmurs, no edema, 2+ peripheral pulses  Resp: Non-labored breathing, symmetrical chest expansion bilaterally  Abd: soft, non-tender to palpation, +BS  MSK: no deformities, full ROM in all ext    Lab Results   Component Value Date     04/16/2024    K 4.6 04/16/2024     04/11/2024    CO2 30 04/16/2024    BUN 8.1 (L) 04/16/2024    CREATININE 0.96 04/16/2024    EGFRIFAFRICA >60 09/24/2021    EGFRNONAA >60 09/24/2021    AST 8 04/16/2024    ALT 11 04/16/2024     CBC:  Lab Results   Component Value Date    WBC 6.86 04/16/2024    HGB 9.3 (L) 04/16/2024    HCT 29.2 (L) 04/16/2024    MCV 79.6 (L) 04/16/2024     04/16/2024     Lipid Panel:  Lab Results   Component Value Date    CHOL 242 (H) 04/06/2024    TRIG 161 (H) 04/06/2024    HDL 35 (L) 04/06/2024    .00 (H) 12/04/2023     Diabetes:  Lab Results   Component Value Date    HGBA1C 5.8 12/04/2023    MICALBCREAT 8.8 10/17/2022     Thyroid:  Lab Results   Component Value Date    TSH 2.135 04/16/2024       Assessment/Plan:  Dayami was seen today for hypertension, back pain and med refills.    Diagnoses and all orders for this visit:    Primary hypertension  -     carvediloL (COREG) 25 MG tablet; Take 1 tablet (25 mg total) by mouth 2 (two) times daily.  -     hydrALAZINE (APRESOLINE) 100 MG tablet; Take 1 tablet (100 mg total) by mouth 2 (two) times daily.  -     amLODIPine (NORVASC) 10 MG tablet; Take 1 tablet (10 mg total) by mouth once daily.  -     candesartan (ATACAND) 32 MG tablet; Take 1 tablet (32 mg total) by mouth once daily.  -     aspirin (ECOTRIN) 81 MG EC tablet; Take 1 tablet (81 mg total) by mouth every " morning.  -     cloNIDine (CATAPRES) 0.2 MG tablet; Take 1 tablet (0.2 mg total) by mouth 2 (two) times daily.    Chronic diastolic congestive heart failure  -     aspirin (ECOTRIN) 81 MG EC tablet; Take 1 tablet (81 mg total) by mouth every morning.    Seasonal allergic rhinitis due to pollen  -     loratadine (CLARITIN) 10 mg tablet; Take 1 tablet (10 mg total) by mouth once daily.    Obstructive sleep apnea syndrome  -     albuterol (PROVENTIL HFA) 90 mcg/actuation inhaler; Inhale 2 puffs into the lungs every 6 (six) hours as needed for Wheezing. Rescue    Hyperlipidemia, unspecified hyperlipidemia type  -     atorvastatin (LIPITOR) 40 MG tablet; Take 1 tablet (40 mg total) by mouth once daily.  -     aspirin (ECOTRIN) 81 MG EC tablet; Take 1 tablet (81 mg total) by mouth every morning.    Degeneration of intervertebral disc of lumbar region  -     traMADoL (ULTRAM) 50 mg tablet; Take 1 tablet (50 mg total) by mouth every 6 (six) hours as needed for Pain.        Health Maintenance   Topic Date Due    DEXA Scan  04/23/2027    Lipid Panel  04/06/2029    TETANUS VACCINE  11/21/2029    Shingles Vaccine  Completed       Follow up in about 3 months (around 8/29/2024).    Future Appointments   Date Time Provider Department Center   8/23/2024  9:40 AM Krzysztof Lamb MD [facility]

## 2024-08-23 ENCOUNTER — OFFICE VISIT (OUTPATIENT)
Dept: FAMILY MEDICINE | Facility: CLINIC | Age: 79
End: 2024-08-23
Payer: COMMERCIAL

## 2024-08-23 VITALS
DIASTOLIC BLOOD PRESSURE: 75 MMHG | HEART RATE: 61 BPM | WEIGHT: 222.81 LBS | BODY MASS INDEX: 38.04 KG/M2 | SYSTOLIC BLOOD PRESSURE: 146 MMHG | HEIGHT: 64 IN | OXYGEN SATURATION: 96 % | TEMPERATURE: 98 F

## 2024-08-23 DIAGNOSIS — E78.5 HYPERLIPIDEMIA, UNSPECIFIED HYPERLIPIDEMIA TYPE: ICD-10-CM

## 2024-08-23 DIAGNOSIS — H04.123 DRY EYES: ICD-10-CM

## 2024-08-23 DIAGNOSIS — I10 PRIMARY HYPERTENSION: Primary | ICD-10-CM

## 2024-08-23 DIAGNOSIS — M51.36 DEGENERATION OF INTERVERTEBRAL DISC OF LUMBAR REGION: ICD-10-CM

## 2024-08-23 DIAGNOSIS — M19.90 OSTEOARTHRITIS, UNSPECIFIED OSTEOARTHRITIS TYPE, UNSPECIFIED SITE: ICD-10-CM

## 2024-08-23 PROCEDURE — 99215 OFFICE O/P EST HI 40 MIN: CPT | Mod: PBBFAC

## 2024-08-23 RX ORDER — CLONIDINE HYDROCHLORIDE 0.2 MG/1
0.2 TABLET ORAL 2 TIMES DAILY
Qty: 180 TABLET | Refills: 1 | Status: SHIPPED | OUTPATIENT
Start: 2024-08-23

## 2024-08-23 RX ORDER — CANDESARTAN 32 MG/1
32 TABLET ORAL DAILY
Qty: 90 TABLET | Refills: 1 | Status: SHIPPED | OUTPATIENT
Start: 2024-08-23

## 2024-08-23 RX ORDER — TRAMADOL HYDROCHLORIDE 50 MG/1
50 TABLET ORAL EVERY 6 HOURS PRN
COMMUNITY
Start: 2024-07-26 | End: 2024-08-23 | Stop reason: SDUPTHER

## 2024-08-23 RX ORDER — AMLODIPINE BESYLATE 10 MG/1
10 TABLET ORAL DAILY
Qty: 90 TABLET | Refills: 1 | Status: SHIPPED | OUTPATIENT
Start: 2024-08-23

## 2024-08-23 RX ORDER — CARVEDILOL 25 MG/1
25 TABLET ORAL 2 TIMES DAILY
Qty: 90 TABLET | Refills: 1 | Status: SHIPPED | OUTPATIENT
Start: 2024-08-23

## 2024-08-23 RX ORDER — TRAMADOL HYDROCHLORIDE 50 MG/1
50 TABLET ORAL EVERY 8 HOURS PRN
Qty: 90 TABLET | Refills: 2 | Status: SHIPPED | OUTPATIENT
Start: 2024-08-23 | End: 2024-11-21

## 2024-08-23 RX ORDER — MINERAL OIL AND PETROLATUM 150; 830 MG/G; MG/G
OINTMENT OPHTHALMIC NIGHTLY
Qty: 3.5 G | Refills: 1 | Status: SHIPPED | OUTPATIENT
Start: 2024-08-23

## 2024-08-23 RX ORDER — ATORVASTATIN CALCIUM 40 MG/1
40 TABLET, FILM COATED ORAL DAILY
Qty: 90 TABLET | Refills: 1 | Status: SHIPPED | OUTPATIENT
Start: 2024-08-23

## 2024-08-23 RX ORDER — HYDRALAZINE HYDROCHLORIDE 100 MG/1
100 TABLET, FILM COATED ORAL 2 TIMES DAILY
Qty: 180 TABLET | Refills: 1 | Status: SHIPPED | OUTPATIENT
Start: 2024-08-23

## 2024-08-23 NOTE — PROGRESS NOTES
[facility] FM Clinic Progress Note    ID:  Dayami Aleman   MRN:  [id number]     8/23/2024    Chief Complaint:    Chief Complaint   Patient presents with    Follow-up    Hypertension     History of Present Illness:  Dayami Aleman is a 79 y.o. female who presents to [facility] FM clinic for hypertension follow up.     Acute Concerns:  No concerns today    Chronic Conditions:  Diastolic CHF: [facility]/24-04/11/24. She states she was not taking her normal outpatient medication during those 4 days due to the nausea and vomiting. She was found to have an NSTEMI with marginally elevated troponins, an MARGIE and a UTI at the time hospital admission. TTE was performed in the hospital showing grade I (mild) left ventricular diastolic dysfunction with a EF of 60-65%, global calcification of the aortic valve with mild aortic stenosis and low central venous pressure (0-5mm Hg). Seen by [facility] in July, daughter does not remember name. No medicine changes or recent procedures.     Resistant HTN: Has been checking her BP at home, reports SBP 140s to 150s and DBP 70s to 80s. BP controlled in office today according to JNC criteria. Currently on quintuple therapy; coreg 25 mg BID, clonidine 0.2 mg BID, hydralazine 100 mg BID, amlodipine 10 mg daily, and candesartan 32 mg daily.  Denies chest pain, SOB, or vision changes.    Multilevel Lumbar Spine Degenerative Disease/OA of knees bilaterally: Takes Tramadol 50mg TID PRN with adequate results. Continues to have pain, but still gets out of the house as frequently as she can to play bingo, has not been able to go recently since being admitted to hospital. Rates pain as 8/10 today.      MAYANK: Sleeps with CPAP machine every night, Able to sleep through the night with the machine, only takes off to go to restroom and puts back on when she gets back in the bed. Has had this machine for 10-12 years, reports same setting since getting the machine.     GERD: Avoiding  trigger foods. Continues with Cimetidine as needed. Controlled at this time.     Vit D Deficiency: taking Vit D daily, vit d levels wnl at last check in 12/23.     HLD:  on 04/06/2024; pt reports more recent lipid panel by [facility]. Followed by [facility]. Compliant with Atorvastatin 40 mg.      Health Maintenance:  DXA 04/23/2024: Normal bone mineral density according to WHO criteria.     Review of Systems  As per HPI    Medical History  Review of patient's allergies indicates:   Allergen Reactions    Sulfa (sulfonamide antibiotics) Itching and Swelling     Past Medical History:   Diagnosis Date    GERD (gastroesophageal reflux disease)     Hypertension     Other seasonal allergic rhinitis     Unspecified osteoarthritis, unspecified site     Vitamin D deficiency      Social Hx:  reports that she has never smoked. She has never used smokeless tobacco. She reports that she does not currently use alcohol. She reports that she does not use drugs.  Social History     Tobacco Use    Smoking status: Never    Smokeless tobacco: Never   Substance Use Topics    Alcohol use: Not Currently    Drug use: Never     FH: family history includes Hypertension in her father and mother; Stroke in her brother, father, and mother.  Medication List with Changes/Refills   New Medications    WHITE PETROLATUM-MINERAL OIL (ARTIFICIAL TEARS, CRISTY/MIN,) 83-15 % OINT    Place into both eyes every evening.   Current Medications    ALBUTEROL (PROVENTIL HFA) 90 MCG/ACTUATION INHALER    Inhale 2 puffs into the lungs every 6 (six) hours as needed for Wheezing. Rescue    ASPIRIN (ECOTRIN) 81 MG EC TABLET    Take 1 tablet (81 mg total) by mouth every morning.    FERROUS SULFATE (IRON) 325 MG (65 MG IRON) TAB TABLET    Take 1 tablet (325 mg total) by mouth daily with breakfast.    IPRATROPIUM (ATROVENT) 21 MCG (0.03 %) NASAL SPRAY    2 sprays by Each Nostril route 3 (three) times daily.    LORATADINE (CLARITIN) 10 MG TABLET    Take 1 tablet (10 mg total) by  mouth once daily.    ONDANSETRON (ZOFRAN-ODT) 4 MG TBDL    Take 4 mg by mouth every 8 (eight) hours as needed.    VITAMIN D (VITAMIN D3) 1000 UNITS TAB    Take 2 tablets (2,000 Units total) by mouth once daily.   Changed and/or Refilled Medications    Modified Medication Previous Medication    AMLODIPINE (NORVASC) 10 MG TABLET amLODIPine (NORVASC) 10 MG tablet       Take 1 tablet (10 mg total) by mouth once daily.    Take 1 tablet (10 mg total) by mouth once daily.    ATORVASTATIN (LIPITOR) 40 MG TABLET atorvastatin (LIPITOR) 40 MG tablet       Take 1 tablet (40 mg total) by mouth once daily.    Take 1 tablet (40 mg total) by mouth once daily.    CANDESARTAN (ATACAND) 32 MG TABLET candesartan (ATACAND) 32 MG tablet       Take 1 tablet (32 mg total) by mouth once daily.    Take 1 tablet (32 mg total) by mouth once daily.    CARVEDILOL (COREG) 25 MG TABLET carvediloL (COREG) 25 MG tablet       Take 1 tablet (25 mg total) by mouth 2 (two) times daily.    Take 1 tablet (25 mg total) by mouth 2 (two) times daily.    CLONIDINE (CATAPRES) 0.2 MG TABLET cloNIDine (CATAPRES) 0.2 MG tablet       Take 1 tablet (0.2 mg total) by mouth 2 (two) times daily.    Take 1 tablet (0.2 mg total) by mouth 2 (two) times daily.    HYDRALAZINE (APRESOLINE) 100 MG TABLET hydrALAZINE (APRESOLINE) 100 MG tablet       Take 1 tablet (100 mg total) by mouth 2 (two) times daily.    Take 1 tablet (100 mg total) by mouth 2 (two) times daily.    TRAMADOL (ULTRAM) 50 MG TABLET traMADoL (ULTRAM) 50 mg tablet       Take 1 tablet (50 mg total) by mouth every 8 (eight) hours as needed for Pain.    Take 50 mg by mouth every 6 (six) hours as needed.       Objective:  Vitals:    08/23/24 0955 08/23/24 1028   BP: (!) 147/86 (!) 146/75   BP Location: Left forearm Left forearm   Patient Position: Sitting Sitting   BP Method: Large (Automatic) Large (Manual)   Pulse: 61    Temp: 98.2 °F (36.8 °C)    TempSrc: Oral    SpO2: 96%    Weight: 101.1 kg (222 lb 12.8 oz)  "   Height: 5' 4" (1.626 m)       Physical Exam    General: no acute distress, uses a cane to ambulate  CV: Regular rate rhythm, no murmurs, no edema, 2+ peripheral pulses  Resp: Non-labored breathing, symmetrical chest expansion bilaterally  Abd: soft, non-tender to palpation, +BS  MSK: no deformities, full ROM in all ext    Lab Results   Component Value Date     04/16/2024    K 4.6 04/16/2024     04/16/2024    CO2 30 04/16/2024    BUN 8.1 (L) 04/16/2024    CREATININE 0.96 04/16/2024    EGFRIFAFRICA >60 09/24/2021    EGFRNONAA >60 09/24/2021    AST 8 04/16/2024    ALT 11 04/16/2024     CBC:  Lab Results   Component Value Date    WBC 6.86 04/16/2024    HGB 9.3 (L) 04/16/2024    HCT 29.2 (L) 04/16/2024    MCV 79.6 (L) 04/16/2024     04/16/2024     Lipid Panel:  Lab Results   Component Value Date    CHOL 242 (H) 04/06/2024    TRIG 161 (H) 04/06/2024    HDL 35 (L) 04/06/2024    .00 (H) 12/04/2023     Diabetes:  Lab Results   Component Value Date    HGBA1C 5.8 12/04/2023    MICALBCREAT 8.8 10/17/2022     Thyroid:  Lab Results   Component Value Date    TSH 2.135 04/16/2024       Assessment/Plan:  Dayami was seen today for follow-up and hypertension.    -patient is on 5 medicines for blood pressure control.  Blood pressure controlled today based on J and see criteria.  Refilled all medicines.  -patient continues to have pain in her lower back and knees bilaterally.  Pain is moderately controlled with tramadol 2 to 3 times a day and Tylenol as needed.    Diagnoses and all orders for this visit:    Primary hypertension  -     amLODIPine (NORVASC) 10 MG tablet; Take 1 tablet (10 mg total) by mouth once daily.  -     candesartan (ATACAND) 32 MG tablet; Take 1 tablet (32 mg total) by mouth once daily.  -     carvediloL (COREG) 25 MG tablet; Take 1 tablet (25 mg total) by mouth 2 (two) times daily.  -     cloNIDine (CATAPRES) 0.2 MG tablet; Take 1 tablet (0.2 mg total) by mouth 2 (two) times daily.  -    "  hydrALAZINE (APRESOLINE) 100 MG tablet; Take 1 tablet (100 mg total) by mouth 2 (two) times daily.    Degeneration of intervertebral disc of lumbar region  Osteoarthritis, unspecified osteoarthritis type, unspecified site  -     traMADoL (ULTRAM) 50 mg tablet; Take 1 tablet (50 mg total) by mouth every 8 (eight) hours as needed for Pain.    Dry eyes  -     white petrolatum-mineral oiL (ARTIFICIAL TEARS, CRISTY/MIN,) 83-15 % Oint; Place into both eyes every evening.    Hyperlipidemia, unspecified hyperlipidemia type  -     atorvastatin (LIPITOR) 40 MG tablet; Take 1 tablet (40 mg total) by mouth once daily.    Health Maintenance   Topic Date Due    DEXA Scan  04/23/2027    Lipid Panel  04/06/2029    TETANUS VACCINE  11/21/2029    Shingles Vaccine  Completed     Follow up in about 3 months (around 11/23/2024).    Future Appointments   Date Time Provider Department Center   12/6/2024  8:20 AM Krzysztof Lamb MD [facility]     Krzysztof Lamb MD  [facility]

## 2024-10-25 DIAGNOSIS — M51.369 DEGENERATION OF INTERVERTEBRAL DISC OF LUMBAR REGION: ICD-10-CM

## 2024-10-25 RX ORDER — TRAMADOL HYDROCHLORIDE 50 MG/1
50 TABLET ORAL EVERY 8 HOURS PRN
Qty: 90 TABLET | Refills: 2 | Status: SHIPPED | OUTPATIENT
Start: 2024-10-25 | End: 2025-01-23

## 2024-12-06 ENCOUNTER — OFFICE VISIT (OUTPATIENT)
Dept: FAMILY MEDICINE | Facility: CLINIC | Age: 79
End: 2024-12-06
Payer: MEDICARE

## 2024-12-06 VITALS
DIASTOLIC BLOOD PRESSURE: 72 MMHG | SYSTOLIC BLOOD PRESSURE: 138 MMHG | OXYGEN SATURATION: 100 % | BODY MASS INDEX: 39.61 KG/M2 | HEART RATE: 59 BPM | HEIGHT: 64 IN | WEIGHT: 232 LBS | TEMPERATURE: 99 F

## 2024-12-06 DIAGNOSIS — G47.33 OBSTRUCTIVE SLEEP APNEA SYNDROME: ICD-10-CM

## 2024-12-06 DIAGNOSIS — E78.5 HYPERLIPIDEMIA, UNSPECIFIED HYPERLIPIDEMIA TYPE: ICD-10-CM

## 2024-12-06 DIAGNOSIS — D64.9 ANEMIA, UNSPECIFIED TYPE: ICD-10-CM

## 2024-12-06 DIAGNOSIS — M51.369 DEGENERATION OF INTERVERTEBRAL DISC OF LUMBAR REGION: ICD-10-CM

## 2024-12-06 DIAGNOSIS — I10 PRIMARY HYPERTENSION: Primary | ICD-10-CM

## 2024-12-06 DIAGNOSIS — G47.33 OSA ON CPAP: ICD-10-CM

## 2024-12-06 DIAGNOSIS — E55.9 VITAMIN D DEFICIENCY: ICD-10-CM

## 2024-12-06 DIAGNOSIS — M19.90 OSTEOARTHRITIS, UNSPECIFIED OSTEOARTHRITIS TYPE, UNSPECIFIED SITE: ICD-10-CM

## 2024-12-06 DIAGNOSIS — K21.9 GASTROESOPHAGEAL REFLUX DISEASE, UNSPECIFIED WHETHER ESOPHAGITIS PRESENT: ICD-10-CM

## 2024-12-06 LAB
25(OH)D3+25(OH)D2 SERPL-MCNC: 37 NG/ML (ref 30–80)
BASOPHILS # BLD AUTO: 0.03 X10(3)/MCL
BASOPHILS NFR BLD AUTO: 0.6 %
CHOLEST SERPL-MCNC: 111 MG/DL
CHOLEST/HDLC SERPL: 2 {RATIO} (ref 0–5)
EOSINOPHIL # BLD AUTO: 0.17 X10(3)/MCL (ref 0–0.9)
EOSINOPHIL NFR BLD AUTO: 3.6 %
ERYTHROCYTE [DISTWIDTH] IN BLOOD BY AUTOMATED COUNT: 18.6 % (ref 11.5–17)
HCT VFR BLD AUTO: 37.6 % (ref 37–47)
HDLC SERPL-MCNC: 51 MG/DL (ref 35–60)
HGB BLD-MCNC: 11.7 G/DL (ref 12–16)
IMM GRANULOCYTES # BLD AUTO: 0.01 X10(3)/MCL (ref 0–0.04)
IMM GRANULOCYTES NFR BLD AUTO: 0.2 %
LDLC SERPL CALC-MCNC: 47 MG/DL (ref 50–140)
LYMPHOCYTES # BLD AUTO: 1.51 X10(3)/MCL (ref 0.6–4.6)
LYMPHOCYTES NFR BLD AUTO: 31.7 %
MCH RBC QN AUTO: 24.1 PG (ref 27–31)
MCHC RBC AUTO-ENTMCNC: 31.1 G/DL (ref 33–36)
MCV RBC AUTO: 77.5 FL (ref 80–94)
MONOCYTES # BLD AUTO: 0.57 X10(3)/MCL (ref 0.1–1.3)
MONOCYTES NFR BLD AUTO: 11.9 %
NEUTROPHILS # BLD AUTO: 2.48 X10(3)/MCL (ref 2.1–9.2)
NEUTROPHILS NFR BLD AUTO: 52 %
NRBC BLD AUTO-RTO: 0 %
PLATELET # BLD AUTO: 178 X10(3)/MCL (ref 130–400)
PLATELETS.RETICULATED NFR BLD AUTO: 8.9 % (ref 0.9–11.2)
PMV BLD AUTO: 0 FL (ref 7.4–10.4)
RBC # BLD AUTO: 4.85 X10(6)/MCL (ref 4.2–5.4)
TRIGL SERPL-MCNC: 63 MG/DL (ref 37–140)
VLDLC SERPL CALC-MCNC: 13 MG/DL
WBC # BLD AUTO: 4.77 X10(3)/MCL (ref 4.5–11.5)

## 2024-12-06 PROCEDURE — 36415 COLL VENOUS BLD VENIPUNCTURE: CPT

## 2024-12-06 PROCEDURE — 82306 VITAMIN D 25 HYDROXY: CPT

## 2024-12-06 PROCEDURE — 99214 OFFICE O/P EST MOD 30 MIN: CPT | Mod: PBBFAC

## 2024-12-06 PROCEDURE — 80061 LIPID PANEL: CPT

## 2024-12-06 PROCEDURE — 85025 COMPLETE CBC W/AUTO DIFF WBC: CPT

## 2024-12-06 RX ORDER — CARVEDILOL 25 MG/1
25 TABLET ORAL 2 TIMES DAILY
Qty: 90 TABLET | Refills: 1 | Status: SHIPPED | OUTPATIENT
Start: 2024-12-06

## 2024-12-06 RX ORDER — CLONIDINE HYDROCHLORIDE 0.2 MG/1
0.2 TABLET ORAL 2 TIMES DAILY
Qty: 180 TABLET | Refills: 1 | Status: SHIPPED | OUTPATIENT
Start: 2024-12-06

## 2024-12-06 RX ORDER — AMLODIPINE BESYLATE 10 MG/1
10 TABLET ORAL DAILY
Qty: 90 TABLET | Refills: 1 | Status: SHIPPED | OUTPATIENT
Start: 2024-12-06

## 2024-12-06 RX ORDER — CANDESARTAN 32 MG/1
32 TABLET ORAL DAILY
Qty: 90 TABLET | Refills: 1 | Status: SHIPPED | OUTPATIENT
Start: 2024-12-06

## 2024-12-06 RX ORDER — TRAMADOL HYDROCHLORIDE 50 MG/1
50 TABLET ORAL EVERY 8 HOURS PRN
Qty: 90 TABLET | Refills: 2 | Status: SHIPPED | OUTPATIENT
Start: 2024-12-06 | End: 2025-03-06

## 2024-12-06 RX ORDER — ALBUTEROL SULFATE 90 UG/1
2 INHALANT RESPIRATORY (INHALATION) EVERY 6 HOURS PRN
Qty: 18 G | Refills: 0 | Status: SHIPPED | OUTPATIENT
Start: 2024-12-06

## 2024-12-06 RX ORDER — HYDRALAZINE HYDROCHLORIDE 100 MG/1
100 TABLET, FILM COATED ORAL 2 TIMES DAILY
Qty: 180 TABLET | Refills: 1 | Status: SHIPPED | OUTPATIENT
Start: 2024-12-06

## 2024-12-06 NOTE — PROGRESS NOTES
[facility] FM Clinic Progress Note    ID:  Dayami Aleman   MRN:  [id number]     12/6/2024    Chief Complaint:    Chief Complaint   Patient presents with    Follow-up     HTN, Acute pain in both knees.     History of Present Illness:  Dayami Aleman is a 79 y.o. female who presents to [facility] FM clinic for follow up of chronic conditions including hypertension.     Resistant HTN: Has been checking her BP at home, reports SBP 140s to 150s and DBP 70s to 80s. BP controlled in office today according to JNC criteria. Currently on quintuple therapy; coreg 25 mg BID, clonidine 0.2 mg BID, hydralazine 100 mg BID, amlodipine 10 mg daily, and candesartan 32 mg daily.  Denies chest pain, SOB, or vision changes.    Diastolic CHF: [facility] 04/05/24-04/11/24. She states she was not taking her normal outpatient medication during those 4 days due to the nausea and vomiting. She was found to have an NSTEMI with marginally elevated troponins, an MARGIE and a UTI at the time hospital admission. TTE was performed in the hospital showing grade I (mild) left ventricular diastolic dysfunction with a EF of 60-65%, global calcification of the aortic valve with mild aortic stenosis and low central venous pressure (0-5mm Hg). No medicine changes or recent procedures.      Multilevel Lumbar Spine Degenerative Disease/OA of knees bilaterally: Takes Tramadol 50mg TID PRN with adequate results. Continues to have pain, but still gets out of the house as frequently as she can to play bingo, has not been able to go recently since being admitted to hospital. Rates pain as 8/10 today.      MAYANK: Sleeps with CPAP machine every night, Able to sleep through the night with the machine, only takes off to go to restroom and puts back on when she gets back in the bed. Has had this machine for 10-12 years, reports same setting since getting the machine.     GERD: Avoiding trigger foods. Continues with Cimetidine as needed.  "Controlled at this time.     Vit D Deficiency: taking Vit D daily, vit d levels wnl at last check in 12/23.      HLD:  on 04/06/2024; pt reports more recent lipid panel by [facility]. Followed by [facility]. Compliant with Atorvastatin 40 mg. Due for repeat    C/o:  No complaints today    Past Medical History:  HLD -  on 04/06/2024  MAYANK  GERD  Multilevel Lumbar Spine Degenerative Disease/OA of knees bilaterally  Resistant hypertension, diastolic heart failure    Healthcare Maintenance:  DXA 04/23/2024: Normal bone mineral density according to WHO criteria.     Review of Systems  As per HPI    Current Outpatient Medications   Medication Instructions    albuterol (PROVENTIL HFA) 90 mcg/actuation inhaler 2 puffs, Inhalation, Every 6 hours PRN, Rescue    amLODIPine (NORVASC) 10 mg, Oral, Daily    aspirin (ECOTRIN) 81 mg, Oral, Every morning    atorvastatin (LIPITOR) 40 mg, Oral, Daily    candesartan (ATACAND) 32 mg, Oral, Daily    carvediloL (COREG) 25 mg, Oral, 2 times daily    cloNIDine (CATAPRES) 0.2 mg, Oral, 2 times daily    ferrous sulfate (IRON) 325 mg, Oral, With breakfast    hydrALAZINE (APRESOLINE) 100 mg, Oral, 2 times daily    ipratropium (ATROVENT) 21 mcg (0.03 %) nasal spray 2 sprays, Each Nostril, 3 times daily    loratadine (CLARITIN) 10 mg, Oral, Daily    ondansetron (ZOFRAN-ODT) 4 mg, Oral, Every 8 hours PRN    traMADoL (ULTRAM) 50 mg, Oral, Every 8 hours PRN    vitamin D (VITAMIN D3) 2,000 Units, Oral, Daily    white petrolatum-mineral oiL (ARTIFICIAL TEARS, CRISTY/MIN,) 83-15 % Oint Both Eyes, Nightly        Objective:  Vitals:    12/06/24 0905   BP: 138/72   BP Location: Right arm   Patient Position: Sitting   Pulse: (!) 59   Temp: 98.6 °F (37 °C)   TempSrc: Oral   SpO2: 100%   Weight: 105.2 kg (232 lb)   Height: 5' 4" (1.626 m)        Physical Exam    General: no acute distress, uses cane to ambulate  CV: Regular rate rhythm, no murmurs, no edema, 2+ peripheral pulses  Resp: Non-labored breathing, " symmetrical chest expansion bilaterally  Abd: soft, non-tender to palpation, +BS  MSK: no deformities, full ROM in all ext    Assessment/Plan:  Dayami was seen today for follow-up.    Diagnoses and all orders for this visit:    Primary hypertension  -  refilled BP medication listed above. Advised pt to continue regimen. BP controlled in office. Denies worsening sob, chest pain, palpations, headache, lower extremity edema.   - BP well controlled in office today    Hyperlipidemia  -     compliant with atorvastatin 40 mg, checking Lipid Panel  - denies myalgias    Vitamin D deficiency  -     checking Vitamin D level today and will advise him supplementation based on results    Degeneration of intervertebral disc of lumbar region  Osteoarthritis of knees bilaterally        - no complaints, well-controlled on current regimen  -     refilled traMADoL (ULTRAM) 50 mg tablet q8h PRN    Obstructive sleep apnea syndrome on CPAP  -     no complaints today, advised patient to continue using CPAP    Follow up in about 3 months (around 3/6/2025).    Krzysztof Lamb MD  [facility]

## 2025-02-06 DIAGNOSIS — I10 PRIMARY HYPERTENSION: ICD-10-CM

## 2025-02-06 DIAGNOSIS — E78.5 HYPERLIPIDEMIA, UNSPECIFIED HYPERLIPIDEMIA TYPE: ICD-10-CM

## 2025-02-06 RX ORDER — CARVEDILOL 25 MG/1
25 TABLET ORAL 2 TIMES DAILY
Qty: 90 TABLET | Refills: 1 | Status: SHIPPED | OUTPATIENT
Start: 2025-02-06

## 2025-02-06 RX ORDER — ATORVASTATIN CALCIUM 40 MG/1
40 TABLET, FILM COATED ORAL DAILY
Qty: 90 TABLET | Refills: 1 | Status: SHIPPED | OUTPATIENT
Start: 2025-02-06

## 2025-02-11 DIAGNOSIS — I10 PRIMARY HYPERTENSION: ICD-10-CM

## 2025-02-11 RX ORDER — AMLODIPINE BESYLATE 10 MG/1
10 TABLET ORAL DAILY
Qty: 90 TABLET | Refills: 1 | Status: SHIPPED | OUTPATIENT
Start: 2025-02-11

## 2025-03-13 ENCOUNTER — OFFICE VISIT (OUTPATIENT)
Dept: FAMILY MEDICINE | Facility: CLINIC | Age: 80
End: 2025-03-13
Payer: COMMERCIAL

## 2025-03-13 VITALS
DIASTOLIC BLOOD PRESSURE: 61 MMHG | RESPIRATION RATE: 18 BRPM | TEMPERATURE: 97 F | HEIGHT: 64 IN | OXYGEN SATURATION: 98 % | WEIGHT: 236.63 LBS | HEART RATE: 72 BPM | SYSTOLIC BLOOD PRESSURE: 154 MMHG | BODY MASS INDEX: 40.4 KG/M2

## 2025-03-13 DIAGNOSIS — M51.369 DEGENERATION OF INTERVERTEBRAL DISC OF LUMBAR REGION: ICD-10-CM

## 2025-03-13 DIAGNOSIS — R35.1 NOCTURIA: Primary | ICD-10-CM

## 2025-03-13 DIAGNOSIS — I10 PRIMARY HYPERTENSION: ICD-10-CM

## 2025-03-13 DIAGNOSIS — M19.90 OSTEOARTHRITIS, UNSPECIFIED OSTEOARTHRITIS TYPE, UNSPECIFIED SITE: ICD-10-CM

## 2025-03-13 PROCEDURE — 99215 OFFICE O/P EST HI 40 MIN: CPT | Mod: PBBFAC

## 2025-03-13 RX ORDER — HYDRALAZINE HYDROCHLORIDE 100 MG/1
100 TABLET, FILM COATED ORAL 2 TIMES DAILY
Qty: 180 TABLET | Refills: 1 | Status: SHIPPED | OUTPATIENT
Start: 2025-03-13

## 2025-03-13 RX ORDER — TRAMADOL HYDROCHLORIDE 50 MG/1
50 TABLET ORAL EVERY 8 HOURS PRN
Qty: 90 TABLET | Refills: 0 | Status: SHIPPED | OUTPATIENT
Start: 2025-09-09 | End: 2025-12-08

## 2025-03-13 RX ORDER — CANDESARTAN 32 MG/1
32 TABLET ORAL DAILY
Qty: 90 TABLET | Refills: 1 | Status: SHIPPED | OUTPATIENT
Start: 2025-03-13

## 2025-03-13 RX ORDER — TRAMADOL HYDROCHLORIDE 50 MG/1
50 TABLET ORAL EVERY 8 HOURS PRN
Qty: 90 TABLET | Refills: 0 | Status: SHIPPED | OUTPATIENT
Start: 2025-06-11 | End: 2025-09-09

## 2025-03-13 RX ORDER — TRAMADOL HYDROCHLORIDE 50 MG/1
50 TABLET ORAL EVERY 8 HOURS PRN
Qty: 90 TABLET | Refills: 0 | Status: SHIPPED | OUTPATIENT
Start: 2025-03-13 | End: 2025-06-11

## 2025-03-13 RX ORDER — CLONIDINE HYDROCHLORIDE 0.2 MG/1
0.2 TABLET ORAL 2 TIMES DAILY
Qty: 180 TABLET | Refills: 1 | Status: SHIPPED | OUTPATIENT
Start: 2025-03-13

## 2025-03-13 NOTE — PATIENT INSTRUCTIONS
Please culture urine at home and bring it to [facility] lab in the clinic at your earliest convenience.  I will call with urinary results if it appears that you have a UTI.

## 2025-03-13 NOTE — PROGRESS NOTES
[facility] OFFICE VISIT NOTE  MRN: [id number]  Date: 03/13/2025    Chief Complaint: Follow-up, Hypertension, and Hyperlipidemia      Subjective:    HPI  Dayami Aleman is a 79 y.o. female  presenting to [facility] for :      Nocturia:  - patient awakening 4-5 times per night  - afraid of falling when getting up to use restroom at night  - patient requesting PureWick for home use  - denies dysuria, vaginal discharge, vaginal irritation, or hematuria    Hypertension:  - patient compliant taking Coreg 25 mg b.i.d., clonidine 0.2 mg b.i.d., hydralazine 100 mg b.i.d., amlodipine 10 mg daily, and candesartan 32 mg daily  - patient denies chest pain, shortness of breath, syncope, or vision changes    Patient requesting medication refills; currently taking tramadol 50 mg q.8 hours p.r.n. secondary to chronic lumbar back pain.  Patient reports significant improvement in pain on the medication.      ROS per HPI above.      Outpatient Medications as of 3/13/2025   Medication Sig Dispense Refill    albuterol (PROVENTIL HFA) 90 mcg/actuation inhaler Inhale 2 puffs into the lungs every 6 (six) hours as needed for Wheezing. Rescue 18 g 0    amLODIPine (NORVASC) 10 MG tablet Take 1 tablet (10 mg total) by mouth once daily. 90 tablet 1    aspirin (ECOTRIN) 81 MG EC tablet Take 1 tablet (81 mg total) by mouth every morning. 90 tablet 1    atorvastatin (LIPITOR) 40 MG tablet Take 1 tablet (40 mg total) by mouth once daily. 90 tablet 1    candesartan (ATACAND) 32 MG tablet Take 1 tablet (32 mg total) by mouth once daily. 90 tablet 1    carvediloL (COREG) 25 MG tablet Take 1 tablet (25 mg total) by mouth 2 (two) times daily. 90 tablet 1    cloNIDine (CATAPRES) 0.2 MG tablet Take 1 tablet (0.2 mg total) by mouth 2 (two) times daily. 180 tablet 1    ferrous sulfate (IRON) 325 mg (65 mg iron) Tab tablet Take 1 tablet (325 mg total) by mouth daily with breakfast. 30 tablet 2    hydrALAZINE (APRESOLINE) 100 MG tablet Take 1 tablet (100 mg total)  "by mouth 2 (two) times daily. 180 tablet 1    ipratropium (ATROVENT) 21 mcg (0.03 %) nasal spray 2 sprays by Each Nostril route 3 (three) times daily. 30 mL 0    loratadine (CLARITIN) 10 mg tablet Take 1 tablet (10 mg total) by mouth once daily. 90 tablet 1    ondansetron (ZOFRAN-ODT) 4 MG TbDL Take 4 mg by mouth every 8 (eight) hours as needed.      vitamin D (VITAMIN D3) 1000 units Tab Take 2 tablets (2,000 Units total) by mouth once daily. 60 tablet 5    white petrolatum-mineral oiL (ARTIFICIAL TEARS, CRISTY/MIN,) 83-15 % Oint Place into both eyes every evening. 3.5 g 1     No current facility-administered medications on file as of 3/13/2025.        Objective:  Vitals:    03/13/25 0844   BP: (!) 154/61   BP Location: Left forearm   Patient Position: Sitting   Pulse: 72   Resp: 18   Temp: 97.4 °F (36.3 °C)   TempSrc: Oral   SpO2: 98%   Weight: 107.3 kg (236 lb 9.6 oz)   Height: 5' 4.02" (1.626 m)       Physical Exam  Constitutional:       General: She is not in acute distress.     Appearance: She is not ill-appearing.   Cardiovascular:      Rate and Rhythm: Normal rate and regular rhythm.   Pulmonary:      Effort: Pulmonary effort is normal. No respiratory distress.      Breath sounds: No wheezing, rhonchi or rales.                 Assessment/ Plan:    Nocturia  - Urinalysis, Reflex to Urine Culture ordered to be collected at home and brought in the lab for analysis.  Patient unable to give urine sample in clinic today  - discussed with the patient medication management if UA noted to be negative; consider using Detrol if UA without bacteria or other signs of infection     Primary hypertension  - blood pressure noted to be 154/61 patient remains largely asymptomatic  - continue current medication regimen; medications refilled at this time    Osteoarthritis  Degeneration of intervertebral disc of lumbar region  - pain well-controlled on current med regimen  - traMADoL (ULTRAM) 50 mg tablet; Take 1 tablet (50 mg " total) by mouth every 8 (eight) hours as needed for Pain.  Dispense: 90 tablet; Refill: 2  -  checked in congruent with medication last refill on 02/06/2025  - pain contract last signed on 04/16/2024; patient will need pain contract renewal at follow up appointment with [facility] at that time        Follow up in about 3 months (around 6/13/2025) for HCM and med refills.         Radha Francois DO  [employer] [facility] Resident, [facility]

## 2025-03-14 ENCOUNTER — LAB VISIT (OUTPATIENT)
Dept: LAB | Facility: HOSPITAL | Age: 80
End: 2025-03-14
Payer: COMMERCIAL

## 2025-03-14 DIAGNOSIS — R35.1 NOCTURIA: ICD-10-CM

## 2025-03-14 LAB
BACTERIA #/AREA URNS AUTO: ABNORMAL /HPF
BILIRUB UR QL STRIP.AUTO: NEGATIVE
CLARITY UR: CLEAR
COLOR UR AUTO: YELLOW
GLUCOSE UR QL STRIP: NORMAL
HGB UR QL STRIP: NEGATIVE
HYALINE CASTS #/AREA URNS LPF: ABNORMAL /LPF
KETONES UR QL STRIP: NEGATIVE
LEUKOCYTE ESTERASE UR QL STRIP: NEGATIVE
MUCOUS THREADS URNS QL MICRO: ABNORMAL /LPF
NITRITE UR QL STRIP: NEGATIVE
PH UR STRIP: 6 [PH]
PROT UR QL STRIP: ABNORMAL
RBC #/AREA URNS AUTO: ABNORMAL /HPF
SP GR UR STRIP.AUTO: 1.03 (ref 1–1.03)
SQUAMOUS #/AREA URNS LPF: ABNORMAL /HPF
UROBILINOGEN UR STRIP-ACNC: NORMAL
WBC #/AREA URNS AUTO: ABNORMAL /HPF

## 2025-03-14 PROCEDURE — 81015 MICROSCOPIC EXAM OF URINE: CPT

## 2025-04-11 DIAGNOSIS — M51.369 DEGENERATION OF INTERVERTEBRAL DISC OF LUMBAR REGION: ICD-10-CM

## 2025-04-11 NOTE — TELEPHONE ENCOUNTER
Previously sent on 3/13/25, for qty #90 Q8H prn, has future orders starting 6/11/25 and 9/9/25, but none for April & May

## 2025-04-14 RX ORDER — TRAMADOL HYDROCHLORIDE 50 MG/1
50 TABLET ORAL EVERY 8 HOURS PRN
Qty: 90 TABLET | Refills: 0 | Status: SHIPPED | OUTPATIENT
Start: 2025-04-14 | End: 2025-07-13

## 2025-05-05 DIAGNOSIS — I10 PRIMARY HYPERTENSION: ICD-10-CM

## 2025-05-06 RX ORDER — CARVEDILOL 25 MG/1
25 TABLET ORAL 2 TIMES DAILY
Qty: 90 TABLET | Refills: 1 | Status: SHIPPED | OUTPATIENT
Start: 2025-05-06

## 2025-06-16 ENCOUNTER — OFFICE VISIT (OUTPATIENT)
Dept: FAMILY MEDICINE | Facility: CLINIC | Age: 80
End: 2025-06-16
Payer: COMMERCIAL

## 2025-06-16 VITALS
TEMPERATURE: 99 F | DIASTOLIC BLOOD PRESSURE: 62 MMHG | HEIGHT: 64 IN | SYSTOLIC BLOOD PRESSURE: 125 MMHG | HEART RATE: 60 BPM | WEIGHT: 229.38 LBS | BODY MASS INDEX: 39.16 KG/M2

## 2025-06-16 DIAGNOSIS — I10 PRIMARY HYPERTENSION: Primary | ICD-10-CM

## 2025-06-16 DIAGNOSIS — G47.33 OBSTRUCTIVE SLEEP APNEA SYNDROME: ICD-10-CM

## 2025-06-16 DIAGNOSIS — J06.9 VIRAL UPPER RESPIRATORY TRACT INFECTION WITH COUGH: ICD-10-CM

## 2025-06-16 DIAGNOSIS — J30.1 SEASONAL ALLERGIC RHINITIS DUE TO POLLEN: ICD-10-CM

## 2025-06-16 DIAGNOSIS — M19.90 OSTEOARTHRITIS, UNSPECIFIED OSTEOARTHRITIS TYPE, UNSPECIFIED SITE: ICD-10-CM

## 2025-06-16 DIAGNOSIS — M51.369 DEGENERATION OF INTERVERTEBRAL DISC OF LUMBAR REGION: ICD-10-CM

## 2025-06-16 DIAGNOSIS — D64.9 ANEMIA, UNSPECIFIED TYPE: ICD-10-CM

## 2025-06-16 DIAGNOSIS — R35.1 NOCTURIA: ICD-10-CM

## 2025-06-16 DIAGNOSIS — E55.9 VITAMIN D DEFICIENCY: ICD-10-CM

## 2025-06-16 DIAGNOSIS — E78.5 HYPERLIPIDEMIA, UNSPECIFIED HYPERLIPIDEMIA TYPE: ICD-10-CM

## 2025-06-16 PROCEDURE — 99215 OFFICE O/P EST HI 40 MIN: CPT | Mod: PBBFAC

## 2025-06-16 RX ORDER — AMLODIPINE BESYLATE 10 MG/1
10 TABLET ORAL DAILY
Qty: 90 TABLET | Refills: 1 | Status: SHIPPED | OUTPATIENT
Start: 2025-06-16

## 2025-06-16 RX ORDER — ATORVASTATIN CALCIUM 40 MG/1
40 TABLET, FILM COATED ORAL DAILY
Qty: 90 TABLET | Refills: 1 | Status: SHIPPED | OUTPATIENT
Start: 2025-06-16

## 2025-06-16 RX ORDER — HYDRALAZINE HYDROCHLORIDE 100 MG/1
100 TABLET, FILM COATED ORAL 2 TIMES DAILY
Qty: 180 TABLET | Refills: 1 | Status: SHIPPED | OUTPATIENT
Start: 2025-06-16

## 2025-06-16 RX ORDER — TRAMADOL HYDROCHLORIDE 50 MG/1
50 TABLET, FILM COATED ORAL EVERY 8 HOURS PRN
Qty: 90 TABLET | Refills: 0 | Status: SHIPPED | OUTPATIENT
Start: 2025-06-16 | End: 2025-09-14

## 2025-06-16 RX ORDER — ALBUTEROL SULFATE 90 UG/1
2 INHALANT RESPIRATORY (INHALATION) EVERY 6 HOURS PRN
Qty: 18 G | Refills: 2 | Status: SHIPPED | OUTPATIENT
Start: 2025-06-16

## 2025-06-16 RX ORDER — CANDESARTAN 32 MG/1
32 TABLET ORAL DAILY
Qty: 90 TABLET | Refills: 1 | Status: SHIPPED | OUTPATIENT
Start: 2025-06-16

## 2025-06-16 RX ORDER — CHOLECALCIFEROL (VITAMIN D3) 25 MCG
2000 TABLET ORAL DAILY
Qty: 60 TABLET | Refills: 11 | Status: SHIPPED | OUTPATIENT
Start: 2025-06-16

## 2025-06-16 RX ORDER — CLONIDINE HYDROCHLORIDE 0.2 MG/1
0.2 TABLET ORAL 2 TIMES DAILY
Qty: 180 TABLET | Refills: 1 | Status: SHIPPED | OUTPATIENT
Start: 2025-06-16

## 2025-06-16 RX ORDER — TRAMADOL HYDROCHLORIDE 50 MG/1
50 TABLET, FILM COATED ORAL EVERY 8 HOURS PRN
Qty: 90 TABLET | Refills: 0 | Status: SHIPPED | OUTPATIENT
Start: 2025-08-16 | End: 2025-11-14

## 2025-06-16 RX ORDER — CARVEDILOL 25 MG/1
25 TABLET ORAL 2 TIMES DAILY
Qty: 90 TABLET | Refills: 1 | Status: SHIPPED | OUTPATIENT
Start: 2025-06-16

## 2025-06-16 RX ORDER — FERROUS SULFATE 325(65) MG
325 TABLET ORAL
Qty: 30 TABLET | Refills: 2 | Status: SHIPPED | OUTPATIENT
Start: 2025-06-16

## 2025-06-16 RX ORDER — TRAMADOL HYDROCHLORIDE 50 MG/1
50 TABLET, FILM COATED ORAL EVERY 8 HOURS PRN
Qty: 90 TABLET | Refills: 0 | Status: SHIPPED | OUTPATIENT
Start: 2025-07-16 | End: 2025-10-14

## 2025-06-16 RX ORDER — LORATADINE 10 MG/1
10 TABLET ORAL DAILY
Qty: 90 TABLET | Refills: 1 | Status: SHIPPED | OUTPATIENT
Start: 2025-06-16

## 2025-06-16 RX ORDER — IPRATROPIUM BROMIDE 21 UG/1
2 SPRAY, METERED NASAL 3 TIMES DAILY
Qty: 30 ML | Refills: 2 | Status: SHIPPED | OUTPATIENT
Start: 2025-06-16

## 2025-06-16 NOTE — PROGRESS NOTES
[facility] OFFICE VISIT NOTE  MRN: [id number]  Date: 06/16/2025    Chief Complaint: Follow-up and Hypertension    Subjective:    HPI  Dayami Aleman is a 80 y.o. female  presenting to [facility] for follow up. Requesting all medication be sent to [facility] pharmacy.     Nocturia:  - patient awakening 3-4 times per night  - afraid of falling when getting up to use restroom at night  - admits to drinking a lot of water throughout the day   - UA relatively unremarkable at last visit  - denies dysuria, vaginal discharge, vaginal irritation, or hematuria  - interested in starting medication. Detrol considered at last visit    Hypertension:  - patient compliant taking Coreg 25 mg b.i.d., clonidine 0.2 mg b.i.d., hydralazine 100 mg b.i.d., amlodipine 10 mg daily, and candesartan 32 mg daily  - patient denies chest pain, shortness of breath, syncope, or vision changes  - BP in office 148/63    Multilevel Lumbar Spine Degenerative Disease/OA of knees bilaterally:   -Takes Tramadol 50mg TID PRN with adequate results. Continues to have pain, but still gets out of the house as frequently as she can to play bingo, has not been able to go recently since being admitted to hospital. Rates pain as 8/10 today.     CO: Nocturia as above. UA done after last visit negative for bacteria.     Past Medical History:  HLD -  on 04/06/2024  MAYANK  GERD  Multilevel Lumbar Spine Degenerative Disease/OA of knees bilaterally - tramadol 50 mg q.8 hours p.r.n.   Resistant hypertension, diastolic heart failure  Hx of vit D Deficiency   Allergies      Outpatient Medications as of 6/16/2025   Medication Sig Dispense Refill    aspirin (ECOTRIN) 81 MG EC tablet Take 1 tablet (81 mg total) by mouth every morning. 90 tablet 1    albuterol (PROVENTIL HFA) 90 mcg/actuation inhaler Inhale 2 puffs into the lungs every 6 (six) hours as needed for Wheezing. Rescue 18 g 2    amLODIPine (NORVASC) 10 MG tablet Take 1 tablet (10 mg total) by mouth once daily. 90 tablet  "1    atorvastatin (LIPITOR) 40 MG tablet Take 1 tablet (40 mg total) by mouth once daily. 90 tablet 1    candesartan (ATACAND) 32 MG tablet Take 1 tablet (32 mg total) by mouth once daily. 90 tablet 1    carvediloL (COREG) 25 MG tablet Take 1 tablet (25 mg total) by mouth 2 (two) times daily. 90 tablet 1    cloNIDine (CATAPRES) 0.2 MG tablet Take 1 tablet (0.2 mg total) by mouth 2 (two) times daily. 180 tablet 1    hydrALAZINE (APRESOLINE) 100 MG tablet Take 1 tablet (100 mg total) by mouth 2 (two) times daily. 180 tablet 1    loratadine (CLARITIN) 10 mg tablet Take 1 tablet (10 mg total) by mouth once daily. 90 tablet 1    ondansetron (ZOFRAN-ODT) 4 MG TbDL Take 4 mg by mouth every 8 (eight) hours as needed.      traMADoL (ULTRAM) 50 mg tablet Take 1 tablet (50 mg total) by mouth every 8 (eight) hours as needed for Pain. 90 tablet 0    [START ON 9/9/2025] traMADoL (ULTRAM) 50 mg tablet Take 1 tablet (50 mg total) by mouth every 8 (eight) hours as needed for Pain. 90 tablet 0    white petrolatum-mineral oiL (ARTIFICIAL TEARS, CRISTY/MIN,) 83-15 % Oint Place into both eyes every evening. 3.5 g 1     No current facility-administered medications on file as of 6/16/2025.        Objective:  Vitals:    06/16/25 1007 06/16/25 1038   BP: (!) 148/63 125/62   BP Location: Right arm Right arm   Patient Position: Sitting Sitting   Pulse: 60    Temp: 98.7 °F (37.1 °C)    TempSrc: Oral    Weight: 104.1 kg (229 lb 6.4 oz)    Height: 5' 4" (1.626 m)          Physical Exam  Constitutional:       General: She is not in acute distress.     Appearance: She is not ill-appearing.   Cardiovascular:      Rate and Rhythm: Normal rate and regular rhythm.   Pulmonary:      Effort: Pulmonary effort is normal. No respiratory distress.      Breath sounds: No wheezing, rhonchi or rales.       Assessment/ Plan:    Dayami was seen today for follow-up. Refilled all medication to [facility] pharmacy.     Diagnoses and all orders for this visit:    Nocturia  - " Urinalysis unremarkable at last visit   - advised pt to alter drinking habits throughout day and to avoid coffee/tea in evening  - educated pt to have timed scheduled voids and to have extended urination before bed  - will consider medication at FU if symptoms unchanged after lifestyle modifications      Primary hypertension  - blood pressure controlled in office based on JNC criteria, asymptomatic  - continue current medication regimen, refilled today     Osteoarthritis  Degeneration of intervertebral disc of lumbar region  - pain well-controlled on current med regimen  - traMADoL (ULTRAM) 50 mg tablet; Take 1 tablet (50 mg total) by mouth every 8 (eight) hours as needed for Pain.  -  checked in congruent with medication last refill on 02/06/2025; needs renewal of pain contract     Follow up in about 6 weeks (around 7/28/2025).     Krzysztof Lamb MD  [facility] Resident

## 2025-07-30 ENCOUNTER — OFFICE VISIT (OUTPATIENT)
Dept: FAMILY MEDICINE | Facility: CLINIC | Age: 80
End: 2025-07-30
Payer: COMMERCIAL

## 2025-07-30 VITALS
RESPIRATION RATE: 16 BRPM | OXYGEN SATURATION: 98 % | WEIGHT: 233.63 LBS | HEART RATE: 63 BPM | DIASTOLIC BLOOD PRESSURE: 51 MMHG | TEMPERATURE: 99 F | SYSTOLIC BLOOD PRESSURE: 147 MMHG | BODY MASS INDEX: 39.88 KG/M2 | HEIGHT: 64 IN

## 2025-07-30 DIAGNOSIS — M19.90 OSTEOARTHRITIS, UNSPECIFIED OSTEOARTHRITIS TYPE, UNSPECIFIED SITE: ICD-10-CM

## 2025-07-30 DIAGNOSIS — H04.123 DRY EYES: ICD-10-CM

## 2025-07-30 DIAGNOSIS — I10 PRIMARY HYPERTENSION: Primary | ICD-10-CM

## 2025-07-30 PROCEDURE — 99215 OFFICE O/P EST HI 40 MIN: CPT | Mod: PBBFAC

## 2025-07-30 RX ORDER — MINERAL OIL AND PETROLATUM 150; 830 MG/G; MG/G
OINTMENT OPHTHALMIC NIGHTLY
Qty: 3.5 G | Refills: 1 | Status: SHIPPED | OUTPATIENT
Start: 2025-07-30

## 2025-07-30 NOTE — PROGRESS NOTES
Discussed with resident at time of encounter 07-30-25.  Pt. seen / examined.  Follow-up visit; seen previously on 06/16/25.  Nocturia improved.  Continued bilateral knee discomfort; improved with use of prn tramadol.  HTN, HF, MAYANK, degenerative disc disease.    PE  Gen: NAD, pleasant; ambulatory with cane.  Chest: lungs clear.  CV: reg. rhythm; 1-2+ systolic murmur loudest at right & left 2nd ICS.  ABD: (+) surgical scar; firm ~ 6 cm subcutaneous mass along mid-abdominal region.  Ext: bilateral knee joint crepitus.    Abdominal exam findings consistent with past CT reports.  ECHO (03/2023): mild aortic stenosis.    Resident's note reviewed 07-30-25.  Agree with assessment; plan of care appropriate.  Recommend repeat ECHO (re: aortic stenosis / progression).  Fall precautions.  Professional services provided in an outpatient primary care center affiliated with a teaching institution.

## 2025-07-30 NOTE — PROGRESS NOTES
[place] OFFICE VISIT NOTE  MRN: [id number]  Date: 07/30/2025    Chief Complaint: Hypertension    Subjective:    HPI  Dayami Aleman is a 80 y.o. female  presenting to [place] for follow up.     Resistant Hypertension  - patient compliant taking Coreg 25 mg b.i.d., clonidine 0.2 mg b.i.d., hydralazine 100 mg b.i.d., amlodipine 10 mg daily, and candesartan 32 mg daily  - patient denies chest pain, shortness of breath, syncope, or vision changes  - BP in office 147/51    Multilevel Lumbar Spine Degenerative Disease/OA of knees bilaterally   -Takes Tramadol 50mg TID PRN with adequate results. Continues to have pain, but still gets out of the house as frequently as she can to play bingo. Rates pain as 6/10 today.   - No recent falls  - Takes Tramadol about 1 to 2 times daily    CO: None    Past Medical History:  HLD -  on 04/06/2024  MAYANK  GERD  Multilevel Lumbar Spine Degenerative Disease/OA of knees bilaterally - tramadol 50 mg q.8 hours p.r.n.   Hx of vit D Deficiency   Allergies  Resistant HTN w/ diastolic heart failure - amlodipine, clonidine, coregg, hydralazine, candesartan. Last Echo 04/06/2024 (60 - 65%) ejection fraction. Grade I (mild) left ventricular.        Outpatient Medications as of 7/30/2025   Medication Sig Dispense Refill    albuterol (PROVENTIL HFA) 90 mcg/actuation inhaler Inhale 2 puffs into the lungs every 6 (six) hours as needed for Wheezing. Rescue 18 g 2    amLODIPine (NORVASC) 10 MG tablet Take 1 tablet (10 mg total) by mouth once daily. 90 tablet 1    aspirin (ECOTRIN) 81 MG EC tablet Take 1 tablet (81 mg total) by mouth every morning. 90 tablet 1    atorvastatin (LIPITOR) 40 MG tablet Take 1 tablet (40 mg total) by mouth once daily. 90 tablet 1    candesartan (ATACAND) 32 MG tablet Take 1 tablet (32 mg total) by mouth once daily. 90 tablet 1    carvediloL (COREG) 25 MG tablet Take 1 tablet (25 mg total) by mouth 2 (two) times daily. 90 tablet 1    cloNIDine (CATAPRES) 0.2 MG tablet  "Take 1 tablet (0.2 mg total) by mouth 2 (two) times daily. 180 tablet 1    ferrous sulfate (IRON) 325 mg (65 mg iron) Tab tablet Take 1 tablet (325 mg total) by mouth daily with breakfast. 30 tablet 2    hydrALAZINE (APRESOLINE) 100 MG tablet Take 1 tablet (100 mg total) by mouth 2 (two) times daily. 180 tablet 1    ipratropium (ATROVENT) 21 mcg (0.03 %) nasal spray 2 sprays by Each Nostril route 3 (three) times daily. 30 mL 2    loratadine (CLARITIN) 10 mg tablet Take 1 tablet (10 mg total) by mouth once daily. 90 tablet 1    ondansetron (ZOFRAN-ODT) 4 MG TbDL Take 4 mg by mouth every 8 (eight) hours as needed.      traMADoL (ULTRAM) 50 mg tablet Take 1 tablet (50 mg total) by mouth every 8 (eight) hours as needed for Pain. 90 tablet 0    [START ON 9/9/2025] traMADoL (ULTRAM) 50 mg tablet Take 1 tablet (50 mg total) by mouth every 8 (eight) hours as needed for Pain. 90 tablet 0    traMADoL (ULTRAM) 50 mg tablet Take 1 tablet (50 mg total) by mouth every 8 (eight) hours as needed for Pain. 90 tablet 0    traMADoL (ULTRAM) 50 mg tablet Take 1 tablet (50 mg total) by mouth every 8 (eight) hours as needed for Pain. 90 tablet 0    [START ON 8/16/2025] traMADoL (ULTRAM) 50 mg tablet Take 1 tablet (50 mg total) by mouth every 8 (eight) hours as needed for Pain. 90 tablet 0    vitamin D (VITAMIN D3) 1000 units Tab Take 2 tablets (2,000 Units total) by mouth once daily. 60 tablet 11     No current facility-administered medications on file as of 7/30/2025.        Objective:  Vitals:    07/30/25 0807 07/30/25 0811   BP: (!) 158/63 (!) 147/51   BP Location: Left arm Right arm   Patient Position: Sitting Sitting   Pulse: 63    Resp: 16    Temp: 98.8 °F (37.1 °C)    TempSrc: Oral    SpO2: 98%    Weight: 106 kg (233 lb 9.6 oz)    Height: 5' 4" (1.626 m)      Physical Exam  Constitutional:       General: She is not in acute distress.     Appearance: She is not ill-appearing.   Cardiovascular:      Rate and Rhythm: Normal rate and " regular rhythm.   Pulmonary:      Effort: Pulmonary effort is normal. No respiratory distress.      Breath sounds: No wheezing, rhonchi or rales.       Assessment/ Plan:    Dayami was seen today for follow-up.    Diagnoses and all orders for this visit:     Primary hypertension  - blood pressure controlled in office based on JNC criteria, asymptomatic  - continue current medication regimen detailed above    Osteoarthritis of lumbar spine and knees  Degeneration of intervertebral disc of lumbar region  - pain well-controlled on current med regimen  - continue traMADoL (ULTRAM) 50 mg tablet; Take 1 tablet (50 mg total) by mouth every 8 (eight) hours as needed for Pain.  -  checked in congruent with medication last refill on 06/16/2025    Follow up in about 3 months (around 10/30/2025).     Krzysztof Lamb MD  [facility] Resident

## 2025-08-06 DIAGNOSIS — I10 PRIMARY HYPERTENSION: Primary | ICD-10-CM

## 2025-08-07 RX ORDER — AMLODIPINE BESYLATE 10 MG/1
10 TABLET ORAL DAILY
Qty: 90 TABLET | Refills: 1 | Status: SHIPPED | OUTPATIENT
Start: 2025-08-07

## 2025-08-07 RX ORDER — CANDESARTAN 32 MG/1
32 TABLET ORAL DAILY
Qty: 90 TABLET | Refills: 1 | Status: SHIPPED | OUTPATIENT
Start: 2025-08-07